# Patient Record
Sex: FEMALE | Race: WHITE | NOT HISPANIC OR LATINO | Employment: OTHER | URBAN - METROPOLITAN AREA
[De-identification: names, ages, dates, MRNs, and addresses within clinical notes are randomized per-mention and may not be internally consistent; named-entity substitution may affect disease eponyms.]

---

## 2017-01-09 DIAGNOSIS — E11.44 DIABETIC AMYOTROPHY ASSOCIATED WITH TYPE 2 DIABETES MELLITUS (HCC): Primary | ICD-10-CM

## 2017-01-11 ENCOUNTER — OFFICE VISIT (OUTPATIENT)
Dept: INTERNAL MEDICINE | Facility: CLINIC | Age: 72
End: 2017-01-11

## 2017-01-11 ENCOUNTER — RESULTS ENCOUNTER (OUTPATIENT)
Dept: INTERNAL MEDICINE | Facility: CLINIC | Age: 72
End: 2017-01-11

## 2017-01-11 VITALS
DIASTOLIC BLOOD PRESSURE: 80 MMHG | HEIGHT: 60 IN | BODY MASS INDEX: 27.48 KG/M2 | WEIGHT: 140 LBS | SYSTOLIC BLOOD PRESSURE: 135 MMHG | OXYGEN SATURATION: 98 % | HEART RATE: 112 BPM

## 2017-01-11 DIAGNOSIS — E11.44 DIABETIC AMYOTROPHY ASSOCIATED WITH TYPE 2 DIABETES MELLITUS (HCC): ICD-10-CM

## 2017-01-11 DIAGNOSIS — E87.1 HYPONATREMIA: ICD-10-CM

## 2017-01-11 DIAGNOSIS — F43.21 SITUATIONAL DEPRESSION: ICD-10-CM

## 2017-01-11 DIAGNOSIS — Z00.00 HEALTHCARE MAINTENANCE: ICD-10-CM

## 2017-01-11 DIAGNOSIS — E11.8 TYPE 2 DIABETES MELLITUS WITH COMPLICATION, WITHOUT LONG-TERM CURRENT USE OF INSULIN (HCC): Primary | ICD-10-CM

## 2017-01-11 DIAGNOSIS — G47.9 SLEEP DIFFICULTIES: ICD-10-CM

## 2017-01-11 DIAGNOSIS — M62.81 PROXIMAL LIMB MUSCLE WEAKNESS: ICD-10-CM

## 2017-01-11 LAB
ALBUMIN SERPL-MCNC: 4 G/DL (ref 3.5–5.2)
ALBUMIN/GLOB SERPL: 2 G/DL
ALP SERPL-CCNC: 42 U/L (ref 40–129)
ALT SERPL-CCNC: 24 U/L (ref 5–33)
AST SERPL-CCNC: 13 U/L (ref 5–32)
BASOPHILS # BLD AUTO: 0.04 10*3/MM3 (ref 0–0.2)
BASOPHILS NFR BLD AUTO: 0.3 % (ref 0–2)
BILIRUB SERPL-MCNC: 0.4 MG/DL (ref 0.2–1.2)
BUN SERPL-MCNC: 25 MG/DL (ref 8–23)
BUN/CREAT SERPL: 47.2 (ref 7–25)
CALCIUM SERPL-MCNC: 9.2 MG/DL (ref 8.8–10.5)
CHLORIDE SERPL-SCNC: 92 MMOL/L (ref 98–107)
CHOLEST SERPL-MCNC: 218 MG/DL (ref 0–200)
CO2 SERPL-SCNC: 28.3 MMOL/L (ref 22–29)
CREAT SERPL-MCNC: 0.53 MG/DL (ref 0.57–1)
EOSINOPHIL # BLD AUTO: 0.05 10*3/MM3 (ref 0.1–0.3)
EOSINOPHIL NFR BLD AUTO: 0.4 % (ref 0–4)
ERYTHROCYTE [DISTWIDTH] IN BLOOD BY AUTOMATED COUNT: 13 % (ref 11.5–14.5)
GLOBULIN SER CALC-MCNC: 2 GM/DL
GLUCOSE SERPL-MCNC: 285 MG/DL (ref 65–99)
HBA1C MFR BLD: 6.8 % (ref 4.8–5.6)
HCT VFR BLD AUTO: 42.4 % (ref 37–47)
HDLC SERPL-MCNC: 84 MG/DL (ref 40–60)
HGB BLD-MCNC: 14.3 G/DL (ref 12–16)
IMM GRANULOCYTES # BLD: 0.26 10*3/MM3 (ref 0–0.03)
IMM GRANULOCYTES NFR BLD: 2.2 % (ref 0–0.5)
LDLC SERPL CALC-MCNC: 103 MG/DL (ref 0–100)
LDLC/HDLC SERPL: 1.23 {RATIO}
LYMPHOCYTES # BLD AUTO: 0.82 10*3/MM3 (ref 0.6–4.8)
LYMPHOCYTES NFR BLD AUTO: 6.9 % (ref 20–45)
MCH RBC QN AUTO: 30.9 PG (ref 27–31)
MCHC RBC AUTO-ENTMCNC: 33.7 G/DL (ref 31–37)
MCV RBC AUTO: 91.6 FL (ref 81–99)
MONOCYTES # BLD AUTO: 0.37 10*3/MM3 (ref 0–1)
MONOCYTES NFR BLD AUTO: 3.1 % (ref 3–8)
NEUTROPHILS # BLD AUTO: 10.28 10*3/MM3 (ref 1.5–8.3)
NEUTROPHILS NFR BLD AUTO: 87.1 % (ref 45–70)
NRBC BLD AUTO-RTO: 0 /100 WBC (ref 0–0)
PLATELET # BLD AUTO: 218 10*3/MM3 (ref 140–500)
POTASSIUM SERPL-SCNC: 4.6 MMOL/L (ref 3.5–5.2)
PROT SERPL-MCNC: 6 G/DL (ref 6–8.5)
RBC # BLD AUTO: 4.63 10*6/MM3 (ref 4.2–5.4)
SODIUM SERPL-SCNC: 132 MMOL/L (ref 136–145)
T4 FREE SERPL-MCNC: 1.7 NG/DL (ref 0.93–1.7)
TRIGL SERPL-MCNC: 154 MG/DL (ref 0–150)
TSH SERPL DL<=0.005 MIU/L-ACNC: 2.18 MIU/ML (ref 0.27–4.2)
VLDLC SERPL CALC-MCNC: 30.8 MG/DL (ref 7–27)
WBC # BLD AUTO: 11.82 10*3/MM3 (ref 4.8–10.8)

## 2017-01-11 PROCEDURE — 99215 OFFICE O/P EST HI 40 MIN: CPT | Performed by: INTERNAL MEDICINE

## 2017-01-11 RX ORDER — DULOXETIN HYDROCHLORIDE 60 MG/1
60 CAPSULE, DELAYED RELEASE ORAL DAILY
Qty: 90 CAPSULE | Refills: 3 | Status: SHIPPED | OUTPATIENT
Start: 2017-01-11 | End: 2018-03-27 | Stop reason: SDUPTHER

## 2017-01-11 RX ORDER — ZOLPIDEM TARTRATE 5 MG/1
5 TABLET ORAL NIGHTLY PRN
Qty: 90 TABLET | Refills: 1 | Status: SHIPPED | OUTPATIENT
Start: 2017-01-11 | End: 2017-03-17 | Stop reason: SDUPTHER

## 2017-01-11 RX ORDER — DOCUSATE SODIUM 100 MG/1
100 CAPSULE, LIQUID FILLED ORAL 3 TIMES DAILY PRN
COMMUNITY
End: 2021-06-29

## 2017-01-11 RX ORDER — PREDNISONE 50 MG/1
15 TABLET ORAL DAILY
COMMUNITY
End: 2017-09-20 | Stop reason: DRUGHIGH

## 2017-01-11 RX ORDER — AMITRIPTYLINE HYDROCHLORIDE 100 MG/1
100 TABLET, FILM COATED ORAL NIGHTLY
Qty: 90 TABLET | Refills: 3 | Status: SHIPPED | OUTPATIENT
Start: 2017-01-11 | End: 2018-01-22

## 2017-01-11 NOTE — MR AVS SNAPSHOT
Lydia John   1/11/2017 10:00 AM   Office Visit    Dept Phone:  [phone]   Encounter #:  [id number]    Provider:  Selnea Davis MD   Department:  [facility] INTERNAL MED AND PEDS                Your Full Care Plan              Today's Medication Changes          These changes are accurate as of: 1/11/17 11:23 AM.  If you have any questions, ask your nurse or doctor.               Medication(s)that have changed:     amitriptyline 100 MG tablet   Commonly known as:  ELAVIL   Take 1 tablet by mouth Every Night.   What changed:  See the new instructions.   Changed by:  Selena Davis MD            Where to Get Your Medications      These medications were sent to [place] [address] - [facility] [place], [place] - [address] [facility] [phone] [facility] [phone]   [address] [place] [address]     Phone:  [phone]     amitriptyline 100 MG tablet    DULoxetine 60 MG capsule         You can get these medications from any pharmacy     Bring a paper prescription for each of these medications     zolpidem 5 MG tablet                  Your Updated Medication List          This list is accurate as of: 1/11/17 11:23 AM.  Always use your most recent med list.                amitriptyline 100 MG tablet   Commonly known as:  ELAVIL   Take 1 tablet by mouth Every Night.       calcium citrate-vitamin d 200-250 MG-UNIT tablet tablet   Commonly known as:  CITRACAL       docusate sodium 100 MG capsule   Commonly known as:  COLACE       DULoxetine 60 MG capsule   Commonly known as:  CYMBALTA   Take 1 capsule by mouth Daily.       ibuprofen 100 MG tablet   Commonly known as:  ADVIL,MOTRIN       metFORMIN 1000 MG tablet   Commonly known as:  GLUCOPHAGE       oxyCODONE-acetaminophen 7.5-325 MG per tablet   Commonly known as:  PERCOCET   Take 1 tablet by mouth Every 4 (Four) Hours As Needed for moderate pain (4-6).       predniSONE 50 MG tablet   Commonly known  as:  DELTASONE       SITagliptin 100 MG tablet   Commonly known as:  JANUVIA       vitamin B-12 100 MCG tablet   Commonly known as:  CYANOCOBALAMIN       vitamin D 18277 UNITS capsule capsule   Commonly known as:  ERGOCALCIFEROL       zolpidem 5 MG tablet   Commonly known as:  AMBIEN   Take 1 tablet by mouth At Night As Needed for sleep.               We Performed the Following     CBC & Differential     Comprehensive Metabolic Panel     Hemoglobin A1c     Lipid Panel With LDL / HDL Ratio     Microalbumin / Creatinine Urine Ratio     T4, Free     TSH       You Were Diagnosed With        Codes Comments    Type 2 diabetes mellitus with complication, without long-term current use of insulin    -  Primary ICD-10-CM: E11.8  ICD-9-CM: 250.90     Healthcare maintenance     ICD-10-CM: Z00.00  ICD-9-CM: V70.0     Situational depression     ICD-10-CM: F43.21  ICD-9-CM: 309.0     Diabetic amyotrophy associated with type 2 diabetes mellitus     ICD-10-CM: E11.44  ICD-9-CM: 250.60, 353.5     Proximal limb muscle weakness     ICD-10-CM: M62.89  ICD-9-CM: 728.87     Hyponatremia     ICD-10-CM: E87.1  ICD-9-CM: 276.1     Sleep difficulties     ICD-10-CM: G47.9  ICD-9-CM: 780.50       Instructions     None    Patient Instructions History      Upcoming Appointments     Visit Type Date Time Department    FOLLOW UP 2017 10:00 AM [facility]      [facility] Signup     [facility] allows you to send messages to your doctor, view your test results, renew your prescriptions, schedule appointments, and more. To sign up, go to [employer] and click on the Sign Up Now link in the New User? box. Enter your [facility] Activation Code exactly as it appears below along with the last four digits of your Social Security Number and your Date of Birth () to complete the sign-up process. If you do not sign up before the expiration date, you must request a new code.    [facility] Activation Code:  [email or web address]  Expires: 1/25/2017 11:23 AM    If you have questions, you can email [email or web address] or call [phone] to talk to our MyChart staff. Remember, MyChart is NOT to be used for urgent needs. For medical emergencies, dial 911.               Other Info from Your Visit           Allergies     No Known Allergies      Reason for Visit     Follow-up 2 MON f/u     Diabetes type II    Leg Swelling pt states started after bree \"it comes and goes\"    Foot Swelling pt states started after bree \"it comes and goes\"      Vital Signs     Blood Pressure Pulse Height Weight Oxygen Saturation Body Mass Index    135/80 (BP Location: Left arm, Patient Position: Sitting, Cuff Size: Adult) 112 60\" (152.4 cm) 140 lb (63.5 kg) 98% 27.34 kg/m2    Smoking Status                   Never Smoker           Problems and Diagnoses Noted     Diabetic amyotrophy associated with type 2 diabetes mellitus    Low sodium levels    Muscle weakness    Adjustment reaction    Type 2 diabetes mellitus with manifestations    Routine medical exam        Sleep difficulties            "

## 2017-01-11 NOTE — PROGRESS NOTES
"Subjective     Lydia John is a 71 y.o. female, who presents with a chief complaint of   Chief Complaint   Patient presents with   • Follow-up     2 MON f/u    • Diabetes     type II   • Leg Swelling     pt states started after bree \"it comes and goes\"   • Foot Swelling     pt states started after bree \"it comes and goes\"       HPI   The pt is here for follow up.  She has noticed a significant change in her pain on Dec 28.  before the 28th it was strong, stabbing, ,and electrical.  Since then it has been a little better.  On 1/2 they saw neuro (andrews) and there was minimal improvement.  She still takes percocet as she needs it but previously took it every 4 hours.  She is on high dose prednisone 50mg and on 1/17 will start to cut back.  With the pain improvement she is ready to start physical therapy.      Diabetes - her sugars have been a little higher lately. She brought her glucose log for review.  She ate some cupcakes over the holidays.  No hypoglycemia.  She is on prednisone 50mg daily.  Her bp and hr are up as well.  She has had some LE edema off and on.  She has compression socks but they make her legs hurt.  Elevating her feet does help.  She denies cp, soa, orthopnea, leach.      She went to pain management.  They discussed a spinal cord stimulator but her pain has improved with the steroids so they are waiting for now.      The following portions of the patient's history were reviewed and updated as appropriate: allergies, current medications, past family history, past medical history, past social history, past surgical history and problem list.    Allergies: Review of patient's allergies indicates no known allergies.    Review of Systems   Constitutional: Negative.    HENT: Negative.    Eyes: Negative.    Respiratory: Negative.    Cardiovascular: Negative.    Gastrointestinal: Negative.    Endocrine: Negative.    Genitourinary: Negative.    Musculoskeletal: Negative.    Skin: Negative.  "   Allergic/Immunologic: Negative.    Neurological: Negative.    Hematological: Negative.    Psychiatric/Behavioral: Negative.    All other systems reviewed and are negative.      Objective     Wt Readings from Last 3 Encounters:   01/11/17 140 lb (63.5 kg)   11/09/16 134 lb 9.6 oz (61.1 kg)   09/07/16 129 lb 3.2 oz (58.6 kg)     Temp Readings from Last 3 Encounters:   06/29/16 98.1 °F (36.7 °C)   05/31/16 98 °F (36.7 °C)     BP Readings from Last 3 Encounters:   01/11/17 135/80   11/09/16 118/70   09/07/16 112/80     Pulse Readings from Last 3 Encounters:   01/11/17 112   11/09/16 99   09/07/16 112     Body mass index is 27.34 kg/(m^2).    Physical Exam   Constitutional: She is oriented to person, place, and time. She appears well-developed and well-nourished. No distress.   HENT:   Head: Normocephalic and atraumatic.   Right Ear: External ear normal.   Left Ear: External ear normal.   Nose: Nose normal.   Mouth/Throat: Oropharynx is clear and moist.   Eyes: Conjunctivae and EOM are normal. Pupils are equal, round, and reactive to light.   Neck: Normal range of motion. Neck supple.   Cardiovascular: Normal rate, regular rhythm, normal heart sounds and intact distal pulses.    Pulmonary/Chest: Effort normal and breath sounds normal. No respiratory distress. She has no wheezes.   Musculoskeletal:   Pt in wheelchair  1+ bilat LE edema.     Neurological: She is alert and oriented to person, place, and time. She exhibits abnormal muscle tone.   Decreased strength bilat LE 2/5.   Skin: Skin is warm and dry.   Psychiatric: She has a normal mood and affect. Her behavior is normal. Judgment and thought content normal.   Nursing note and vitals reviewed.        11/16/16 labs  Creat normal    9/30/16 a1c 7.9  7/8/16 6.2    Results for orders placed or performed in visit on 01/11/17   Comprehensive Metabolic Panel   Result Value Ref Range    Glucose 285 (H) 65 - 99 mg/dL    BUN 25 (H) 8 - 23 mg/dL    Creatinine 0.53 (L) 0.57 -  1.00 mg/dL    eGFR Non African Am 114 >60 mL/min/1.73    eGFR African Am 138 >60 mL/min/1.73    BUN/Creatinine Ratio 47.2 (H) 7.0 - 25.0    Sodium 132 (L) 136 - 145 mmol/L    Potassium 4.6 3.5 - 5.2 mmol/L    Chloride 92 (L) 98 - 107 mmol/L    Total CO2 28.3 22.0 - 29.0 mmol/L    Calcium 9.2 8.8 - 10.5 mg/dL    Total Protein 6.0 6.0 - 8.5 g/dL    Albumin 4.00 3.50 - 5.20 g/dL    Globulin 2.0 gm/dL    A/G Ratio 2.0 g/dL    Total Bilirubin 0.4 0.2 - 1.2 mg/dL    Alkaline Phosphatase 42 40 - 129 U/L    AST (SGOT) 13 5 - 32 U/L    ALT (SGPT) 24 5 - 33 U/L   Hemoglobin A1c   Result Value Ref Range    Hemoglobin A1C 6.80 (H) 4.80 - 5.60 %   Lipid Panel With LDL / HDL Ratio   Result Value Ref Range    Total Cholesterol 218 (H) 0 - 200 mg/dL    Triglycerides 154 (H) 0 - 150 mg/dL    HDL Cholesterol 84 (H) 40 - 60 mg/dL    VLDL Cholesterol 30.8 (H) 7 - 27 mg/dL    LDL Cholesterol  103 (H) 0 - 100 mg/dL    LDL/HDL Ratio 1.23    T4, Free   Result Value Ref Range    Free T4 1.70 0.93 - 1.70 ng/dL   TSH   Result Value Ref Range    TSH 2.180 0.270 - 4.200 mIU/mL   CBC & Differential   Result Value Ref Range    WBC 11.82 (H) 4.80 - 10.80 10*3/mm3    RBC 4.63 4.20 - 5.40 10*6/mm3    Hemoglobin 14.3 12.0 - 16.0 g/dL    Hematocrit 42.4 37.0 - 47.0 %    MCV 91.6 81.0 - 99.0 fL    MCH 30.9 27.0 - 31.0 pg    MCHC 33.7 31.0 - 37.0 g/dL    RDW 13.0 11.5 - 14.5 %    Platelets 218 140 - 500 10*3/mm3    Neutrophil Rel % 87.1 (H) 45.0 - 70.0 %    Lymphocyte Rel % 6.9 (L) 20.0 - 45.0 %    Monocyte Rel % 3.1 3.0 - 8.0 %    Eosinophil Rel % 0.4 0.0 - 4.0 %    Basophil Rel % 0.3 0.0 - 2.0 %    Neutrophils Absolute 10.28 (H) 1.50 - 8.30 10*3/mm3    Lymphocytes Absolute 0.82 0.60 - 4.80 10*3/mm3    Monocytes Absolute 0.37 0.00 - 1.00 10*3/mm3    Eosinophils Absolute 0.05 (L) 0.10 - 0.30 10*3/mm3    Basophils Absolute 0.04 0.00 - 0.20 10*3/mm3    Immature Granulocyte Rel % 2.2 (H) 0.0 - 0.5 %    Immature Grans Absolute 0.26 (H) 0.00 - 0.03 10*3/mm3     nRBC 0.0 0.0 - 0.0 /100 WBC       Assessment/Plan   Lydia was seen today for follow-up, diabetes, leg swelling and foot swelling.    Diagnoses and all orders for this visit:    Type 2 diabetes mellitus with complication, without long-term current use of insulin  -     Comprehensive Metabolic Panel; Future  -     CBC & Differential; Future  -     Hemoglobin A1c; Future  -     Lipid Panel With LDL / HDL Ratio; Future  -     T4, Free; Future  -     TSH; Future  -     Microalbumin / Creatinine Urine Ratio  -     Comprehensive Metabolic Panel  -     CBC & Differential  -     Hemoglobin A1c  -     Lipid Panel With LDL / HDL Ratio  -     T4, Free  -     TSH    Healthcare maintenance  -     Cologuard; Future    Situational depression  -     DULoxetine (CYMBALTA) 60 MG capsule; Take 1 capsule by mouth Daily.    Diabetic amyotrophy associated with type 2 diabetes mellitus  -     amitriptyline (ELAVIL) 100 MG tablet; Take 1 tablet by mouth Every Night.  -     DULoxetine (CYMBALTA) 60 MG capsule; Take 1 capsule by mouth Daily.    Proximal limb muscle weakness  -     Comprehensive Metabolic Panel; Future  -     Comprehensive Metabolic Panel    Hyponatremia  -     Comprehensive Metabolic Panel; Future  -     Comprehensive Metabolic Panel    Sleep difficulties  -     zolpidem (AMBIEN) 5 MG tablet; Take 1 tablet by mouth At Night As Needed for sleep.        Current Outpatient Prescriptions:   •  amitriptyline (ELAVIL) 100 MG tablet, Take 1 tablet by mouth Every Night., Disp: 90 tablet, Rfl: 3  •  calcium citrate-vitamin d (CITRACAL) 200-250 MG-UNIT tablet tablet, Take 1 tablet by mouth daily., Disp: , Rfl:   •  docusate sodium (COLACE) 100 MG capsule, Take 100 mg by mouth 3 (Three) Times a Day As Needed for constipation., Disp: , Rfl:   •  DULoxetine (CYMBALTA) 60 MG capsule, Take 1 capsule by mouth Daily., Disp: 90 capsule, Rfl: 3  •  ibuprofen (ADVIL,MOTRIN) 100 MG tablet, Take 600 mg by mouth every 6 (six) hours., Disp: ,  Rfl:   •  metFORMIN (GLUCOPHAGE) 1000 MG tablet, Take 1,000 mg by mouth 2 (Two) Times a Day With Meals., Disp: , Rfl:   •  oxyCODONE-acetaminophen (PERCOCET) 7.5-325 MG per tablet, Take 1 tablet by mouth Every 4 (Four) Hours As Needed for moderate pain (4-6)., Disp: 90 tablet, Rfl: 0  •  predniSONE (DELTASONE) 50 MG tablet, Take 50 mg by mouth Daily., Disp: , Rfl:   •  sitaGLIPtin (JANUVIA) 100 MG tablet, Take 100 mg by mouth daily., Disp: , Rfl:   •  vitamin B-12 (CYANOCOBALAMIN) 100 MCG tablet, Take 50 mcg by mouth daily., Disp: , Rfl:   •  vitamin D (ERGOCALCIFEROL) 82391 UNITS capsule capsule, , Disp: , Rfl:   •  zolpidem (AMBIEN) 5 MG tablet, Take 1 tablet by mouth At Night As Needed for sleep., Disp: 90 tablet, Rfl: 1  Medications Discontinued During This Encounter   Medication Reason   • amitriptyline (ELAVIL) 100 MG tablet Reorder   • zolpidem (AMBIEN) 5 MG tablet Reorder   • DULoxetine (CYMBALTA) 60 MG capsule Reorder       Return in about 2 months (around 3/11/2017) for Recheck.

## 2017-01-17 ENCOUNTER — TELEPHONE (OUTPATIENT)
Dept: INTERNAL MEDICINE | Facility: CLINIC | Age: 72
End: 2017-01-17

## 2017-01-17 NOTE — TELEPHONE ENCOUNTER
Left message with results on patients voicemail.   ----- Message from Selena Davis MD sent at 1/15/2017 10:50 PM EST -----  Call pt about labs.  a1c 6.8.  Wbc up some but this is likely from steroids.  Cont same meds at this time.

## 2017-03-17 ENCOUNTER — OFFICE VISIT (OUTPATIENT)
Dept: INTERNAL MEDICINE | Facility: CLINIC | Age: 72
End: 2017-03-17

## 2017-03-17 VITALS
HEART RATE: 101 BPM | HEIGHT: 60 IN | OXYGEN SATURATION: 97 % | DIASTOLIC BLOOD PRESSURE: 88 MMHG | SYSTOLIC BLOOD PRESSURE: 134 MMHG

## 2017-03-17 DIAGNOSIS — E11.44 DIABETIC AMYOTROPHY ASSOCIATED WITH TYPE 2 DIABETES MELLITUS (HCC): ICD-10-CM

## 2017-03-17 DIAGNOSIS — E11.8 TYPE 2 DIABETES MELLITUS WITH COMPLICATION, WITHOUT LONG-TERM CURRENT USE OF INSULIN (HCC): Primary | ICD-10-CM

## 2017-03-17 DIAGNOSIS — E78.2 MIXED HYPERLIPIDEMIA: ICD-10-CM

## 2017-03-17 DIAGNOSIS — G47.9 SLEEP DIFFICULTIES: ICD-10-CM

## 2017-03-17 DIAGNOSIS — M62.81 PROXIMAL LIMB MUSCLE WEAKNESS: ICD-10-CM

## 2017-03-17 PROCEDURE — 99214 OFFICE O/P EST MOD 30 MIN: CPT | Performed by: INTERNAL MEDICINE

## 2017-03-17 RX ORDER — ZOLPIDEM TARTRATE 5 MG/1
5 TABLET ORAL NIGHTLY PRN
Qty: 90 TABLET | Refills: 1 | Status: SHIPPED | OUTPATIENT
Start: 2017-03-17 | End: 2017-05-24 | Stop reason: SDUPTHER

## 2017-03-17 NOTE — PROGRESS NOTES
Subjective     Lydia John is a 71 y.o. female, who presents with a chief complaint of   Chief Complaint   Patient presents with   • Follow-up   • Diabetes       HPI   The pt is here for follow up.  She has diabetic amyotrophy.  Dr. randolph is her neurologist.  Since her last OV she had and EMG that showed improvement.  She is doing home PT 3 times a week and is getting stronger.  She is starting to get more independent .  She improves with steroids and has weaned to 20mg daily. The plan is to wean steroids by 5mg q 2 weeks.  She is also on amitriptyline and duloxetine. She takes percocet PRN.      She has some wasting on her hands near the snuff box and has been referred to hand surgery for evaluation. She has bilat hand weakness and poor .      The following portions of the patient's history were reviewed and updated as appropriate: allergies, current medications, past family history, past medical history, past social history, past surgical history and problem list.    Allergies: Review of patient's allergies indicates no known allergies.    Review of Systems   HENT: Negative.    Eyes: Negative.    Respiratory: Negative.    Cardiovascular: Negative.    Gastrointestinal: Negative.    Endocrine: Negative.    Genitourinary: Negative.    Musculoskeletal: Negative.         Muscle weakness especially in legs and hands   Skin: Negative.    Allergic/Immunologic: Negative.    Neurological: Positive for weakness.   Hematological: Negative.    Psychiatric/Behavioral: Negative.    All other systems reviewed and are negative.      Objective     Wt Readings from Last 3 Encounters:   01/11/17 140 lb (63.5 kg)   11/09/16 134 lb 9.6 oz (61.1 kg)   09/07/16 129 lb 3.2 oz (58.6 kg)     Temp Readings from Last 3 Encounters:   06/29/16 98.1 °F (36.7 °C)   05/31/16 98 °F (36.7 °C)     BP Readings from Last 3 Encounters:   03/17/17 134/88   01/11/17 135/80   11/09/16 118/70     Pulse Readings from Last 3 Encounters:   03/17/17 101    01/11/17 112   11/09/16 99     There is no height or weight on file to calculate BMI.  SpO2 Readings from Last 3 Encounters:   03/17/17 97%   01/11/17 98%   11/09/16 98%       Physical Exam   Constitutional: She is oriented to person, place, and time. She appears well-developed and well-nourished. No distress.   HENT:   Head: Normocephalic and atraumatic.   Right Ear: External ear normal.   Left Ear: External ear normal.   Nose: Nose normal.   Mouth/Throat: Oropharynx is clear and moist.   Eyes: Conjunctivae and EOM are normal. Pupils are equal, round, and reactive to light.   Neck: Normal range of motion. Neck supple.   Cardiovascular: Normal rate, regular rhythm, normal heart sounds and intact distal pulses.    Pulmonary/Chest: Effort normal and breath sounds normal. No respiratory distress. She has no wheezes.   Musculoskeletal: Normal range of motion.   Normal gait   Neurological: She is alert and oriented to person, place, and time.   Strength 2/5 bilat LE ,  3/5   Skin: Skin is warm and dry.   Psychiatric: She has a normal mood and affect. Her behavior is normal. Judgment and thought content normal.   Nursing note and vitals reviewed.      Results for orders placed or performed in visit on 01/11/17   Comprehensive Metabolic Panel   Result Value Ref Range    Glucose 285 (H) 65 - 99 mg/dL    BUN 25 (H) 8 - 23 mg/dL    Creatinine 0.53 (L) 0.57 - 1.00 mg/dL    eGFR Non African Am 114 >60 mL/min/1.73    eGFR African Am 138 >60 mL/min/1.73    BUN/Creatinine Ratio 47.2 (H) 7.0 - 25.0    Sodium 132 (L) 136 - 145 mmol/L    Potassium 4.6 3.5 - 5.2 mmol/L    Chloride 92 (L) 98 - 107 mmol/L    Total CO2 28.3 22.0 - 29.0 mmol/L    Calcium 9.2 8.8 - 10.5 mg/dL    Total Protein 6.0 6.0 - 8.5 g/dL    Albumin 4.00 3.50 - 5.20 g/dL    Globulin 2.0 gm/dL    A/G Ratio 2.0 g/dL    Total Bilirubin 0.4 0.2 - 1.2 mg/dL    Alkaline Phosphatase 42 40 - 129 U/L    AST (SGOT) 13 5 - 32 U/L    ALT (SGPT) 24 5 - 33 U/L   Hemoglobin  A1c   Result Value Ref Range    Hemoglobin A1C 6.80 (H) 4.80 - 5.60 %   Lipid Panel With LDL / HDL Ratio   Result Value Ref Range    Total Cholesterol 218 (H) 0 - 200 mg/dL    Triglycerides 154 (H) 0 - 150 mg/dL    HDL Cholesterol 84 (H) 40 - 60 mg/dL    VLDL Cholesterol 30.8 (H) 7 - 27 mg/dL    LDL Cholesterol  103 (H) 0 - 100 mg/dL    LDL/HDL Ratio 1.23    T4, Free   Result Value Ref Range    Free T4 1.70 0.93 - 1.70 ng/dL   TSH   Result Value Ref Range    TSH 2.180 0.270 - 4.200 mIU/mL   CBC & Differential   Result Value Ref Range    WBC 11.82 (H) 4.80 - 10.80 10*3/mm3    RBC 4.63 4.20 - 5.40 10*6/mm3    Hemoglobin 14.3 12.0 - 16.0 g/dL    Hematocrit 42.4 37.0 - 47.0 %    MCV 91.6 81.0 - 99.0 fL    MCH 30.9 27.0 - 31.0 pg    MCHC 33.7 31.0 - 37.0 g/dL    RDW 13.0 11.5 - 14.5 %    Platelets 218 140 - 500 10*3/mm3    Neutrophil Rel % 87.1 (H) 45.0 - 70.0 %    Lymphocyte Rel % 6.9 (L) 20.0 - 45.0 %    Monocyte Rel % 3.1 3.0 - 8.0 %    Eosinophil Rel % 0.4 0.0 - 4.0 %    Basophil Rel % 0.3 0.0 - 2.0 %    Neutrophils Absolute 10.28 (H) 1.50 - 8.30 10*3/mm3    Lymphocytes Absolute 0.82 0.60 - 4.80 10*3/mm3    Monocytes Absolute 0.37 0.00 - 1.00 10*3/mm3    Eosinophils Absolute 0.05 (L) 0.10 - 0.30 10*3/mm3    Basophils Absolute 0.04 0.00 - 0.20 10*3/mm3    Immature Granulocyte Rel % 2.2 (H) 0.0 - 0.5 %    Immature Grans Absolute 0.26 (H) 0.00 - 0.03 10*3/mm3    nRBC 0.0 0.0 - 0.0 /100 WBC       Assessment/Plan   Lydia was seen today for follow-up and diabetes.    Diagnoses and all orders for this visit:    Type 2 diabetes mellitus with complication, without long-term current use of insulin  -     Comprehensive Metabolic Panel; Future  -     CBC & Differential; Future  -     Microalbumin / Creatinine Urine Ratio  -     Lipid Panel With LDL / HDL Ratio; Future  -     Hemoglobin A1c; Future    Sleep difficulties  -     zolpidem (AMBIEN) 5 MG tablet; Take 1 tablet by mouth At Night As Needed for Sleep.    Diabetic  amyotrophy associated with type 2 diabetes mellitus  -     Comprehensive Metabolic Panel; Future  -     CBC & Differential; Future    Proximal limb muscle weakness  -     Comprehensive Metabolic Panel; Future  -     CBC & Differential; Future    Mixed hyperlipidemia  -     Comprehensive Metabolic Panel; Future  -     Lipid Panel With LDL / HDL Ratio; Future          Outpatient Medications Prior to Visit   Medication Sig Dispense Refill   • amitriptyline (ELAVIL) 100 MG tablet Take 1 tablet by mouth Every Night. 90 tablet 3   • calcium citrate-vitamin d (CITRACAL) 200-250 MG-UNIT tablet tablet Take 1 tablet by mouth daily.     • docusate sodium (COLACE) 100 MG capsule Take 100 mg by mouth 3 (Three) Times a Day As Needed for constipation.     • DULoxetine (CYMBALTA) 60 MG capsule Take 1 capsule by mouth Daily. 90 capsule 3   • ibuprofen (ADVIL,MOTRIN) 100 MG tablet Take 600 mg by mouth every 6 (six) hours.     • metFORMIN (GLUCOPHAGE) 1000 MG tablet Take 1,000 mg by mouth 2 (Two) Times a Day With Meals.     • oxyCODONE-acetaminophen (PERCOCET) 7.5-325 MG per tablet Take 1 tablet by mouth Every 4 (Four) Hours As Needed for moderate pain (4-6). 90 tablet 0   • predniSONE (DELTASONE) 50 MG tablet Take 20 mg by mouth Daily.     • sitaGLIPtin (JANUVIA) 100 MG tablet Take 100 mg by mouth daily.     • vitamin B-12 (CYANOCOBALAMIN) 100 MCG tablet Take 50 mcg by mouth daily.     • vitamin D (ERGOCALCIFEROL) 97537 UNITS capsule capsule      • zolpidem (AMBIEN) 5 MG tablet Take 1 tablet by mouth At Night As Needed for sleep. 90 tablet 1     No facility-administered medications prior to visit.      New Medications Ordered This Visit   Medications   • zolpidem (AMBIEN) 5 MG tablet     Sig: Take 1 tablet by mouth At Night As Needed for Sleep.     Dispense:  90 tablet     Refill:  1     Medications Discontinued During This Encounter   Medication Reason   • zolpidem (AMBIEN) 5 MG tablet Reorder         Return for f/u about 2 mo.

## 2017-05-24 ENCOUNTER — OFFICE VISIT (OUTPATIENT)
Dept: INTERNAL MEDICINE | Facility: CLINIC | Age: 72
End: 2017-05-24

## 2017-05-24 VITALS
DIASTOLIC BLOOD PRESSURE: 78 MMHG | BODY MASS INDEX: 31.02 KG/M2 | HEART RATE: 95 BPM | SYSTOLIC BLOOD PRESSURE: 118 MMHG | HEIGHT: 60 IN | WEIGHT: 158 LBS | OXYGEN SATURATION: 97 %

## 2017-05-24 DIAGNOSIS — E11.44 DIABETIC AMYOTROPHY ASSOCIATED WITH TYPE 2 DIABETES MELLITUS (HCC): ICD-10-CM

## 2017-05-24 DIAGNOSIS — E11.8 TYPE 2 DIABETES MELLITUS WITH COMPLICATION, WITHOUT LONG-TERM CURRENT USE OF INSULIN (HCC): Primary | ICD-10-CM

## 2017-05-24 DIAGNOSIS — G47.9 SLEEP DIFFICULTIES: ICD-10-CM

## 2017-05-24 DIAGNOSIS — E78.2 MIXED HYPERLIPIDEMIA: ICD-10-CM

## 2017-05-24 PROCEDURE — 99215 OFFICE O/P EST HI 40 MIN: CPT | Performed by: INTERNAL MEDICINE

## 2017-05-24 RX ORDER — ZOLPIDEM TARTRATE 5 MG/1
5 TABLET ORAL NIGHTLY PRN
Qty: 90 TABLET | Refills: 1 | Status: SHIPPED | OUTPATIENT
Start: 2017-05-24 | End: 2017-09-20 | Stop reason: SDUPTHER

## 2017-05-26 LAB
ALBUMIN SERPL-MCNC: 4.2 G/DL (ref 3.5–5.2)
ALBUMIN/GLOB SERPL: 2.3 G/DL
ALP SERPL-CCNC: 43 U/L (ref 40–129)
ALT SERPL-CCNC: 21 U/L (ref 5–33)
AST SERPL-CCNC: 15 U/L (ref 5–32)
BASOPHILS # BLD AUTO: 0.06 10*3/MM3 (ref 0–0.2)
BASOPHILS NFR BLD AUTO: 0.6 % (ref 0–2)
BILIRUB SERPL-MCNC: 0.3 MG/DL (ref 0.2–1.2)
BUN SERPL-MCNC: 19 MG/DL (ref 8–23)
BUN/CREAT SERPL: 36.5 (ref 7–25)
CALCIUM SERPL-MCNC: 9.1 MG/DL (ref 8.8–10.5)
CHLORIDE SERPL-SCNC: 95 MMOL/L (ref 98–107)
CHOLEST SERPL-MCNC: 284 MG/DL (ref 100–199)
CO2 SERPL-SCNC: 24.1 MMOL/L (ref 22–29)
CREAT SERPL-MCNC: 0.52 MG/DL (ref 0.57–1)
EOSINOPHIL # BLD AUTO: 0.06 10*3/MM3 (ref 0.1–0.3)
EOSINOPHIL NFR BLD AUTO: 0.6 % (ref 0–4)
ERYTHROCYTE [DISTWIDTH] IN BLOOD BY AUTOMATED COUNT: 12.9 % (ref 11.5–14.5)
GLOBULIN SER CALC-MCNC: 1.8 GM/DL
GLUCOSE SERPL-MCNC: 240 MG/DL (ref 65–99)
HBA1C MFR BLD: 7.7 % (ref 4.8–5.6)
HCT VFR BLD AUTO: 40 % (ref 37–47)
HDL SERPL-SCNC: 44.3 UMOL/L
HDLC SERPL-MCNC: 91 MG/DL
HGB BLD-MCNC: 13.8 G/DL (ref 12–16)
IMM GRANULOCYTES # BLD: 0.2 10*3/MM3 (ref 0–0.03)
IMM GRANULOCYTES NFR BLD: 2 % (ref 0–0.5)
LDL SERPL QN: 22.2 NM
LDL SERPL-SCNC: 1853 NMOL/L
LDL SMALL SERPL-SCNC: <90 NMOL/L
LDLC SERPL CALC-MCNC: 149 MG/DL (ref 0–99)
LYMPHOCYTES # BLD AUTO: 0.87 10*3/MM3 (ref 0.6–4.8)
LYMPHOCYTES NFR BLD AUTO: 8.8 % (ref 20–45)
MCH RBC QN AUTO: 31.7 PG (ref 27–31)
MCHC RBC AUTO-ENTMCNC: 34.5 G/DL (ref 31–37)
MCV RBC AUTO: 91.7 FL (ref 81–99)
MONOCYTES # BLD AUTO: 0.4 10*3/MM3 (ref 0–1)
MONOCYTES NFR BLD AUTO: 4 % (ref 3–8)
NEUTROPHILS # BLD AUTO: 8.32 10*3/MM3 (ref 1.5–8.3)
NEUTROPHILS NFR BLD AUTO: 84 % (ref 45–70)
NRBC BLD AUTO-RTO: 0 /100 WBC (ref 0–0)
PLATELET # BLD AUTO: 230 10*3/MM3 (ref 140–500)
POTASSIUM SERPL-SCNC: 4.8 MMOL/L (ref 3.5–5.2)
PROT SERPL-MCNC: 6 G/DL (ref 6–8.5)
RBC # BLD AUTO: 4.36 10*6/MM3 (ref 4.2–5.4)
SODIUM SERPL-SCNC: 137 MMOL/L (ref 136–145)
T4 FREE SERPL-MCNC: 1.24 NG/DL (ref 0.93–1.7)
TRIGL SERPL-MCNC: 219 MG/DL (ref 0–149)
TSH SERPL DL<=0.005 MIU/L-ACNC: 1.74 MIU/ML (ref 0.27–4.2)
WBC # BLD AUTO: 9.91 10*3/MM3 (ref 4.8–10.8)

## 2017-09-12 DIAGNOSIS — E55.9 VITAMIN D DEFICIENCY: ICD-10-CM

## 2017-09-12 DIAGNOSIS — E11.8 TYPE 2 DIABETES MELLITUS WITH COMPLICATION, UNSPECIFIED LONG TERM INSULIN USE STATUS: ICD-10-CM

## 2017-09-12 DIAGNOSIS — E78.49 OTHER HYPERLIPIDEMIA: Primary | ICD-10-CM

## 2017-09-17 ENCOUNTER — RESULTS ENCOUNTER (OUTPATIENT)
Dept: INTERNAL MEDICINE | Facility: CLINIC | Age: 72
End: 2017-09-17

## 2017-09-17 DIAGNOSIS — E11.8 TYPE 2 DIABETES MELLITUS WITH COMPLICATION, UNSPECIFIED LONG TERM INSULIN USE STATUS: ICD-10-CM

## 2017-09-17 DIAGNOSIS — E78.49 OTHER HYPERLIPIDEMIA: ICD-10-CM

## 2017-09-17 DIAGNOSIS — E55.9 VITAMIN D DEFICIENCY: ICD-10-CM

## 2017-09-20 ENCOUNTER — OFFICE VISIT (OUTPATIENT)
Dept: INTERNAL MEDICINE | Facility: CLINIC | Age: 72
End: 2017-09-20

## 2017-09-20 ENCOUNTER — RESULTS ENCOUNTER (OUTPATIENT)
Dept: INTERNAL MEDICINE | Facility: CLINIC | Age: 72
End: 2017-09-20

## 2017-09-20 VITALS
HEART RATE: 102 BPM | HEIGHT: 60 IN | BODY MASS INDEX: 34.55 KG/M2 | WEIGHT: 176 LBS | OXYGEN SATURATION: 98 % | SYSTOLIC BLOOD PRESSURE: 136 MMHG | DIASTOLIC BLOOD PRESSURE: 86 MMHG

## 2017-09-20 DIAGNOSIS — E55.9 VITAMIN D DEFICIENCY: ICD-10-CM

## 2017-09-20 DIAGNOSIS — Z12.11 SCREENING FOR COLON CANCER: Primary | ICD-10-CM

## 2017-09-20 DIAGNOSIS — R42 VERTIGO: ICD-10-CM

## 2017-09-20 DIAGNOSIS — E13.44 DIABETIC AMYOTROPHY ASSOCIATED WITH OTHER SPECIFIED DIABETES MELLITUS (HCC): ICD-10-CM

## 2017-09-20 DIAGNOSIS — F43.21 SITUATIONAL DEPRESSION: ICD-10-CM

## 2017-09-20 DIAGNOSIS — G47.9 SLEEP DIFFICULTIES: ICD-10-CM

## 2017-09-20 DIAGNOSIS — Z12.11 SCREENING FOR COLON CANCER: ICD-10-CM

## 2017-09-20 DIAGNOSIS — E11.44 DIABETIC AMYOTROPHY ASSOCIATED WITH TYPE 2 DIABETES MELLITUS (HCC): ICD-10-CM

## 2017-09-20 DIAGNOSIS — Z23 NEED FOR VACCINATION: ICD-10-CM

## 2017-09-20 DIAGNOSIS — E78.2 MIXED HYPERLIPIDEMIA: ICD-10-CM

## 2017-09-20 DIAGNOSIS — E11.8 TYPE 2 DIABETES MELLITUS WITH COMPLICATION, WITHOUT LONG-TERM CURRENT USE OF INSULIN (HCC): ICD-10-CM

## 2017-09-20 DIAGNOSIS — K29.00 ACUTE GASTRITIS WITHOUT HEMORRHAGE, UNSPECIFIED GASTRITIS TYPE: ICD-10-CM

## 2017-09-20 PROCEDURE — 99215 OFFICE O/P EST HI 40 MIN: CPT | Performed by: INTERNAL MEDICINE

## 2017-09-20 PROCEDURE — G0008 ADMIN INFLUENZA VIRUS VAC: HCPCS | Performed by: INTERNAL MEDICINE

## 2017-09-20 PROCEDURE — 90662 IIV NO PRSV INCREASED AG IM: CPT | Performed by: INTERNAL MEDICINE

## 2017-09-20 RX ORDER — ZOLPIDEM TARTRATE 5 MG/1
5 TABLET ORAL NIGHTLY PRN
Qty: 90 TABLET | Refills: 1 | Status: SHIPPED | OUTPATIENT
Start: 2017-09-20 | End: 2018-04-24 | Stop reason: SDUPTHER

## 2017-09-20 RX ORDER — OMEPRAZOLE 20 MG/1
20 CAPSULE, DELAYED RELEASE ORAL DAILY
Qty: 90 CAPSULE | Refills: 0 | Status: SHIPPED | OUTPATIENT
Start: 2017-09-20 | End: 2017-12-16 | Stop reason: SDUPTHER

## 2017-09-20 RX ORDER — MECLIZINE HYDROCHLORIDE 25 MG/1
25 TABLET ORAL 3 TIMES DAILY PRN
Qty: 30 TABLET | Refills: 0 | Status: SHIPPED | OUTPATIENT
Start: 2017-09-20 | End: 2018-03-15 | Stop reason: ALTCHOICE

## 2017-09-20 RX ORDER — PREDNISONE 1 MG/1
TABLET ORAL
COMMUNITY
Start: 2017-06-19 | End: 2018-03-15 | Stop reason: ALTCHOICE

## 2017-09-20 NOTE — PROGRESS NOTES
Subjective     Lydia John is a 72 y.o. female, who presents with a chief complaint of   Chief Complaint   Patient presents with   • Follow-up     Was not able to get her labs done.    • Diabetes       HPI   The pt is here with her daughter today for follow up.     The pt has been having lots of dizziness, foot swelling, and heartburn.  She has been having stomach upset especially in the morning.  She is still on 5mg prednisone daily.  No soa or chest pain.  She has had vertigo.  Rolling in bed is very difficult and makes her feel like she is swimming.      Her bp is elevated today but much better on recheck.  She is not on bp meds.      She is worried about her memory.  She feels like she is having more issues remembering things.  The pt's father had dementia and she worries about this. She is on prednisone and chronic pain medications.      The following portions of the patient's history were reviewed and updated as appropriate: allergies, current medications, past family history, past medical history, past social history, past surgical history and problem list.    Allergies: Review of patient's allergies indicates no known allergies.    Review of Systems   Constitutional: Positive for fatigue.   HENT: Negative.    Eyes: Negative.    Respiratory: Negative.    Cardiovascular: Positive for leg swelling.   Gastrointestinal: Negative.    Endocrine: Negative.    Genitourinary: Negative.    Musculoskeletal: Negative.    Skin: Negative.    Allergic/Immunologic: Negative.    Neurological: Positive for weakness.   Hematological: Negative.    Psychiatric/Behavioral: Positive for decreased concentration.   All other systems reviewed and are negative.      Objective     Wt Readings from Last 3 Encounters:   09/20/17 176 lb (79.8 kg)   05/24/17 158 lb (71.7 kg)   01/11/17 140 lb (63.5 kg)     Temp Readings from Last 3 Encounters:   06/29/16 98.1 °F (36.7 °C)   05/31/16 98 °F (36.7 °C)     BP Readings from Last 3 Encounters:    09/20/17 136/86   05/24/17 118/78   03/17/17 134/88     Pulse Readings from Last 3 Encounters:   09/20/17 102   05/24/17 95   03/17/17 101     Body mass index is 34.37 kg/(m^2).  SpO2 Readings from Last 3 Encounters:   09/20/17 98%   05/24/17 97%   03/17/17 97%       Physical Exam   Constitutional: She is oriented to person, place, and time. She appears well-developed and well-nourished. No distress.   HENT:   Head: Normocephalic and atraumatic.   Right Ear: External ear normal.   Left Ear: External ear normal.   Nose: Nose normal.   Mouth/Throat: Oropharynx is clear and moist.   Eyes: Conjunctivae and EOM are normal. Pupils are equal, round, and reactive to light.   Neck: Normal range of motion. Neck supple.   Cardiovascular: Normal rate, regular rhythm, normal heart sounds and intact distal pulses.    Pulmonary/Chest: Effort normal and breath sounds normal. No respiratory distress. She has no wheezes.   Musculoskeletal: She exhibits edema.   Pt in wheelchair.     Neurological: She is alert and oriented to person, place, and time.   Skin: Skin is warm and dry.   Psychiatric: She has a normal mood and affect. Her behavior is normal. Judgment and thought content normal.   Nursing note and vitals reviewed.      Results for orders placed or performed in visit on 05/24/17   Comprehensive Metabolic Panel   Result Value Ref Range    Glucose 240 (H) 65 - 99 mg/dL    BUN 19 8 - 23 mg/dL    Creatinine 0.52 (L) 0.57 - 1.00 mg/dL    eGFR Non African Am 117 >60 mL/min/1.73    eGFR African Am 141 >60 mL/min/1.73    BUN/Creatinine Ratio 36.5 (H) 7.0 - 25.0    Sodium 137 136 - 145 mmol/L    Potassium 4.8 3.5 - 5.2 mmol/L    Chloride 95 (L) 98 - 107 mmol/L    Total CO2 24.1 22.0 - 29.0 mmol/L    Calcium 9.1 8.8 - 10.5 mg/dL    Total Protein 6.0 6.0 - 8.5 g/dL    Albumin 4.20 3.50 - 5.20 g/dL    Globulin 1.8 gm/dL    A/G Ratio 2.3 g/dL    Total Bilirubin 0.3 0.2 - 1.2 mg/dL    Alkaline Phosphatase 43 40 - 129 U/L    AST (SGOT) 15  5 - 32 U/L    ALT (SGPT) 21 5 - 33 U/L   T4, Free   Result Value Ref Range    Free T4 1.24 0.93 - 1.70 ng/dL   TSH   Result Value Ref Range    TSH 1.740 0.270 - 4.200 mIU/mL   NMR LipoProfile   Result Value Ref Range    LDL-P 1853 (H) <1000 nmol/L    LDL-C 149 (H) 0 - 99 mg/dL    HDL-C 91 >39 mg/dL    Triglycerides 219 (H) 0 - 149 mg/dL    Total Cholesterol 284 (H) 100 - 199 mg/dL    HDL-P (Total) 44.3 >=30.5 umol/L    Small LDL-P <90 <=527 nmol/L    LDL Size 22.2 >20.5 nm   Hemoglobin A1c   Result Value Ref Range    Hemoglobin A1C 7.70 (H) 4.80 - 5.60 %   CBC & Differential   Result Value Ref Range    WBC 9.91 4.80 - 10.80 10*3/mm3    RBC 4.36 4.20 - 5.40 10*6/mm3    Hemoglobin 13.8 12.0 - 16.0 g/dL    Hematocrit 40.0 37.0 - 47.0 %    MCV 91.7 81.0 - 99.0 fL    MCH 31.7 (H) 27.0 - 31.0 pg    MCHC 34.5 31.0 - 37.0 g/dL    RDW 12.9 11.5 - 14.5 %    Platelets 230 140 - 500 10*3/mm3    Neutrophil Rel % 84.0 (H) 45.0 - 70.0 %    Lymphocyte Rel % 8.8 (L) 20.0 - 45.0 %    Monocyte Rel % 4.0 3.0 - 8.0 %    Eosinophil Rel % 0.6 0.0 - 4.0 %    Basophil Rel % 0.6 0.0 - 2.0 %    Neutrophils Absolute 8.32 (H) 1.50 - 8.30 10*3/mm3    Lymphocytes Absolute 0.87 0.60 - 4.80 10*3/mm3    Monocytes Absolute 0.40 0.00 - 1.00 10*3/mm3    Eosinophils Absolute 0.06 (L) 0.10 - 0.30 10*3/mm3    Basophils Absolute 0.06 0.00 - 0.20 10*3/mm3    Immature Granulocyte Rel % 2.0 (H) 0.0 - 0.5 %    Immature Grans Absolute 0.20 (H) 0.00 - 0.03 10*3/mm3    nRBC 0.0 0.0 - 0.0 /100 WBC       Assessment/Plan   Lydia was seen today for follow-up and diabetes.    Diagnoses and all orders for this visit:    Screening for colon cancer  -     Cologuard; Future    Vertigo  -     meclizine (ANTIVERT) 25 MG tablet; Take 1 tablet by mouth 3 (Three) Times a Day As Needed for dizziness.    Acute gastritis without hemorrhage, unspecified gastritis type  -     omeprazole (PRILOSEC) 20 MG capsule; Take 1 capsule by mouth Daily.    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      Outpatient Medications Prior to Visit   Medication Sig Dispense Refill   • amitriptyline (ELAVIL) 100 MG tablet Take 1 tablet by mouth Every Night. 90 tablet 3   • calcium citrate-vitamin d (CITRACAL) 200-250 MG-UNIT tablet tablet Take 1 tablet by mouth daily.     • docusate sodium (COLACE) 100 MG capsule Take 100 mg by mouth 3 (Three) Times a Day As Needed for constipation.     • DULoxetine (CYMBALTA) 60 MG capsule Take 1 capsule by mouth Daily. 90 capsule 3   • oxyCODONE-acetaminophen (PERCOCET) 7.5-325 MG per tablet Take 1 tablet by mouth Every 4 (Four) Hours As Needed for moderate pain (4-6). 90 tablet 0   • sitaGLIPtin (JANUVIA) 100 MG tablet Take 100 mg by mouth daily.     • vitamin B-12 (CYANOCOBALAMIN) 100 MCG tablet Take 50 mcg by mouth daily.     • vitamin D (ERGOCALCIFEROL) 45451 UNITS capsule capsule      • zolpidem (AMBIEN) 5 MG tablet Take 1 tablet by mouth At Night As Needed for Sleep. 90 tablet 1   • ibuprofen (ADVIL,MOTRIN) 100 MG tablet Take 600 mg by mouth every 6 (six) hours.     • metFORMIN (GLUCOPHAGE) 1000 MG tablet Take 1,000 mg by mouth 2 (Two) Times a Day With Meals.     • predniSONE (DELTASONE) 50 MG tablet Take 15 mg by mouth Daily.       No facility-administered medications prior to visit.      No orders of the defined types  were placed in this encounter.      Medications Discontinued During This Encounter   Medication Reason   • predniSONE (DELTASONE) 50 MG tablet Dose adjustment   • metFORMIN (GLUCOPHAGE) 1000 MG tablet Dose adjustment   • ibuprofen (ADVIL,MOTRIN) 100 MG tablet          Return in about 3 months (around 12/20/2017) for Recheck, labs.

## 2017-09-21 LAB
25(OH)D3+25(OH)D2 SERPL-MCNC: 19.6 NG/ML
ALBUMIN SERPL-MCNC: 4.2 G/DL (ref 3.5–5.2)
ALBUMIN/GLOB SERPL: 1.9 G/DL
ALP SERPL-CCNC: 80 U/L (ref 40–129)
ALT SERPL-CCNC: 29 U/L (ref 5–33)
AST SERPL-CCNC: 20 U/L (ref 5–32)
BASOPHILS # BLD AUTO: 0.03 10*3/MM3 (ref 0–0.2)
BASOPHILS NFR BLD AUTO: 0.3 % (ref 0–2)
BILIRUB SERPL-MCNC: 0.2 MG/DL (ref 0.2–1.2)
BUN SERPL-MCNC: 19 MG/DL (ref 8–23)
BUN/CREAT SERPL: 35.8 (ref 7–25)
CALCIUM SERPL-MCNC: 9.5 MG/DL (ref 8.8–10.5)
CHLORIDE SERPL-SCNC: 96 MMOL/L (ref 98–107)
CHOLEST SERPL-MCNC: 273 MG/DL (ref 100–199)
CO2 SERPL-SCNC: 24.1 MMOL/L (ref 22–29)
CREAT SERPL-MCNC: 0.53 MG/DL (ref 0.57–1)
EOSINOPHIL # BLD AUTO: 0.1 10*3/MM3 (ref 0.1–0.3)
EOSINOPHIL NFR BLD AUTO: 0.9 % (ref 0–4)
ERYTHROCYTE [DISTWIDTH] IN BLOOD BY AUTOMATED COUNT: 13.2 % (ref 11.5–14.5)
GLOBULIN SER CALC-MCNC: 2.2 GM/DL
GLUCOSE SERPL-MCNC: 138 MG/DL (ref 65–99)
HBA1C MFR BLD: 6.4 % (ref 4.8–5.6)
HCT VFR BLD AUTO: 40.1 % (ref 37–47)
HDL SERPL-SCNC: 28.7 UMOL/L
HDLC SERPL-MCNC: 62 MG/DL
HGB BLD-MCNC: 13.5 G/DL (ref 12–16)
IMM GRANULOCYTES # BLD: 0.09 10*3/MM3 (ref 0–0.03)
IMM GRANULOCYTES NFR BLD: 0.8 % (ref 0–0.5)
LDL SERPL QN: 21 NM
LDL SERPL-SCNC: 2295 NMOL/L
LDL SMALL SERPL-SCNC: 1045 NMOL/L
LDLC SERPL CALC-MCNC: 157 MG/DL (ref 0–99)
LYMPHOCYTES # BLD AUTO: 1.3 10*3/MM3 (ref 0.6–4.8)
LYMPHOCYTES NFR BLD AUTO: 12.3 % (ref 20–45)
MCH RBC QN AUTO: 30.9 PG (ref 27–31)
MCHC RBC AUTO-ENTMCNC: 33.7 G/DL (ref 31–37)
MCV RBC AUTO: 91.8 FL (ref 81–99)
MONOCYTES # BLD AUTO: 0.65 10*3/MM3 (ref 0–1)
MONOCYTES NFR BLD AUTO: 6.1 % (ref 3–8)
NEUTROPHILS # BLD AUTO: 8.43 10*3/MM3 (ref 1.5–8.3)
NEUTROPHILS NFR BLD AUTO: 79.6 % (ref 45–70)
NRBC BLD AUTO-RTO: 0 /100 WBC (ref 0–0)
PLATELET # BLD AUTO: 259 10*3/MM3 (ref 140–500)
POTASSIUM SERPL-SCNC: 4.7 MMOL/L (ref 3.5–5.2)
PROT SERPL-MCNC: 6.4 G/DL (ref 6–8.5)
RBC # BLD AUTO: 4.37 10*6/MM3 (ref 4.2–5.4)
SODIUM SERPL-SCNC: 135 MMOL/L (ref 136–145)
TRIGL SERPL-MCNC: 272 MG/DL (ref 0–149)
WBC # BLD AUTO: 10.6 10*3/MM3 (ref 4.8–10.8)

## 2017-12-16 DIAGNOSIS — K29.00 ACUTE GASTRITIS WITHOUT HEMORRHAGE, UNSPECIFIED GASTRITIS TYPE: ICD-10-CM

## 2017-12-18 RX ORDER — OMEPRAZOLE 20 MG/1
CAPSULE, DELAYED RELEASE ORAL
Qty: 90 CAPSULE | Refills: 1 | Status: SHIPPED | OUTPATIENT
Start: 2017-12-18 | End: 2018-06-09 | Stop reason: SDUPTHER

## 2018-01-22 ENCOUNTER — OFFICE VISIT (OUTPATIENT)
Dept: INTERNAL MEDICINE | Facility: CLINIC | Age: 73
End: 2018-01-22

## 2018-01-22 VITALS
OXYGEN SATURATION: 97 % | HEIGHT: 60 IN | DIASTOLIC BLOOD PRESSURE: 78 MMHG | WEIGHT: 177.4 LBS | HEART RATE: 93 BPM | BODY MASS INDEX: 34.83 KG/M2 | SYSTOLIC BLOOD PRESSURE: 112 MMHG

## 2018-01-22 DIAGNOSIS — R94.31 ABNORMAL EKG: ICD-10-CM

## 2018-01-22 DIAGNOSIS — E13.44 DIABETIC AMYOTROPHY ASSOCIATED WITH OTHER SPECIFIED DIABETES MELLITUS (HCC): ICD-10-CM

## 2018-01-22 DIAGNOSIS — M79.602 LEFT ARM PAIN: ICD-10-CM

## 2018-01-22 DIAGNOSIS — E78.2 MIXED HYPERLIPIDEMIA: Primary | ICD-10-CM

## 2018-01-22 DIAGNOSIS — E11.8 TYPE 2 DIABETES MELLITUS WITH COMPLICATION, WITHOUT LONG-TERM CURRENT USE OF INSULIN (HCC): ICD-10-CM

## 2018-01-22 LAB
ALBUMIN SERPL-MCNC: 4.1 G/DL (ref 3.5–5.2)
ALBUMIN/GLOB SERPL: 2.3 G/DL
ALP SERPL-CCNC: 109 U/L (ref 40–129)
ALT SERPL-CCNC: 24 U/L (ref 5–33)
AST SERPL-CCNC: 22 U/L (ref 5–32)
BASOPHILS # BLD AUTO: 0.01 10*3/MM3 (ref 0–0.2)
BASOPHILS NFR BLD AUTO: 0.1 % (ref 0–2)
BILIRUB SERPL-MCNC: 0.2 MG/DL (ref 0.2–1.2)
BUN SERPL-MCNC: 14 MG/DL (ref 8–23)
BUN/CREAT SERPL: 16.5 (ref 7–25)
CALCIUM SERPL-MCNC: 9.3 MG/DL (ref 8.8–10.5)
CHLORIDE SERPL-SCNC: 98 MMOL/L (ref 98–107)
CHOLEST SERPL-MCNC: 287 MG/DL (ref 0–200)
CO2 SERPL-SCNC: 27.9 MMOL/L (ref 22–29)
CREAT SERPL-MCNC: 0.85 MG/DL (ref 0.57–1)
EOSINOPHIL # BLD AUTO: 0.34 10*3/MM3 (ref 0.1–0.3)
EOSINOPHIL NFR BLD AUTO: 4.2 % (ref 0–4)
ERYTHROCYTE [DISTWIDTH] IN BLOOD BY AUTOMATED COUNT: 13.7 % (ref 11.5–14.5)
GLOBULIN SER CALC-MCNC: 1.8 GM/DL
GLUCOSE SERPL-MCNC: 120 MG/DL (ref 65–99)
HBA1C MFR BLD: 5.5 % (ref 4.8–5.6)
HCT VFR BLD AUTO: 39 % (ref 37–47)
HDLC SERPL-MCNC: 53 MG/DL (ref 40–60)
HGB BLD-MCNC: 12.7 G/DL (ref 12–16)
IMM GRANULOCYTES # BLD: 0.04 10*3/MM3 (ref 0–0.03)
IMM GRANULOCYTES NFR BLD: 0.5 % (ref 0–0.5)
LDLC SERPL CALC-MCNC: 160 MG/DL (ref 0–100)
LDLC/HDLC SERPL: 3.02 {RATIO}
LYMPHOCYTES # BLD AUTO: 1.89 10*3/MM3 (ref 0.6–4.8)
LYMPHOCYTES NFR BLD AUTO: 23.5 % (ref 20–45)
MCH RBC QN AUTO: 29.8 PG (ref 27–31)
MCHC RBC AUTO-ENTMCNC: 32.6 G/DL (ref 31–37)
MCV RBC AUTO: 91.5 FL (ref 81–99)
MONOCYTES # BLD AUTO: 0.54 10*3/MM3 (ref 0–1)
MONOCYTES NFR BLD AUTO: 6.7 % (ref 3–8)
NEUTROPHILS # BLD AUTO: 5.22 10*3/MM3 (ref 1.5–8.3)
NEUTROPHILS NFR BLD AUTO: 65 % (ref 45–70)
NRBC BLD AUTO-RTO: 0 /100 WBC (ref 0–0)
PLATELET # BLD AUTO: 281 10*3/MM3 (ref 140–500)
POTASSIUM SERPL-SCNC: 5.2 MMOL/L (ref 3.5–5.2)
PROT SERPL-MCNC: 5.9 G/DL (ref 6–8.5)
RBC # BLD AUTO: 4.26 10*6/MM3 (ref 4.2–5.4)
SODIUM SERPL-SCNC: 139 MMOL/L (ref 136–145)
T4 FREE SERPL-MCNC: 1.14 NG/DL (ref 0.93–1.7)
TRIGL SERPL-MCNC: 371 MG/DL (ref 0–150)
TSH SERPL DL<=0.005 MIU/L-ACNC: 4.02 MIU/ML (ref 0.27–4.2)
VLDLC SERPL CALC-MCNC: 74.2 MG/DL (ref 7–27)
WBC # BLD AUTO: 8.04 10*3/MM3 (ref 4.8–10.8)

## 2018-01-22 PROCEDURE — 93000 ELECTROCARDIOGRAM COMPLETE: CPT | Performed by: INTERNAL MEDICINE

## 2018-01-22 PROCEDURE — 99215 OFFICE O/P EST HI 40 MIN: CPT | Performed by: INTERNAL MEDICINE

## 2018-01-22 RX ORDER — AMITRIPTYLINE HYDROCHLORIDE 75 MG/1
TABLET, FILM COATED ORAL NIGHTLY
COMMUNITY
Start: 2018-01-14 | End: 2018-10-30 | Stop reason: SDUPTHER

## 2018-01-22 NOTE — PROGRESS NOTES
Subjective     Lydia John is a 72 y.o. female, who presents with a chief complaint of   Chief Complaint   Patient presents with   • Follow-up   • Diabetes       HPI   The pt is here for follow up.  She is still in a wheel chair.  She has been doing fairly well but she tires out easily.      The pt had been having diarrhea that seemed to be worse with the metformin.  She is doing better with a lower dose of metformin.  She is off her steroids at this time and glucoses were down around 70-80.    She has chronic LE edema.  She has some left arm pain.  She braces herself with her left arm often on her walker.  She gets more pain when she raises her left arm.  + worsening fatigue.  No chest pain.  Sometimes she gets dizzy.  She is not on bp meds.  Both parents and grandparents on both sides with heart issues.  She has a younger brother that is fairly healthy.      She continences to follow with dr randolph with neurology.  Her amitriptyline was cut down from 100mg to 75mg daily.  During the morning she does fine and then around 3-4 pm she has pain that begins and goes through the night.  Her next appt with neuro is Thursday this week.  She isnt in PT right now.  She knows she should be doing her PT exercises but is not doing them currently.  She does try to stay up and active as much as possible.     She is sleeping better with low dose ambien.        The following portions of the patient's history were reviewed and updated as appropriate: allergies, current medications, past family history, past medical history, past social history, past surgical history and problem list.    Allergies: Review of patient's allergies indicates no known allergies.    Review of Systems   Constitutional: Negative.    HENT: Negative.    Eyes: Negative.    Respiratory: Negative.    Cardiovascular: Negative.    Gastrointestinal: Negative.    Endocrine: Negative.    Genitourinary: Negative.    Musculoskeletal:        Chronic leg pain and  weakness   Skin: Negative.    Allergic/Immunologic: Negative.    Neurological: Negative.    Hematological: Negative.    Psychiatric/Behavioral: Negative.    All other systems reviewed and are negative.      Objective     Wt Readings from Last 3 Encounters:   01/22/18 80.5 kg (177 lb 6.4 oz)   09/20/17 79.8 kg (176 lb)   05/24/17 71.7 kg (158 lb)     Temp Readings from Last 3 Encounters:   06/29/16 98.1 °F (36.7 °C)   05/31/16 98 °F (36.7 °C)     BP Readings from Last 3 Encounters:   01/22/18 112/78   09/20/17 136/86   05/24/17 118/78     Pulse Readings from Last 3 Encounters:   01/22/18 93   09/20/17 102   05/24/17 95     Body mass index is 34.65 kg/(m^2).  SpO2 Readings from Last 3 Encounters:   01/22/18 97%   09/20/17 98%   05/24/17 97%       Physical Exam   Constitutional: She is oriented to person, place, and time. She appears well-developed and well-nourished. No distress.   HENT:   Head: Normocephalic and atraumatic.   Right Ear: External ear normal.   Left Ear: External ear normal.   Nose: Nose normal.   Mouth/Throat: Oropharynx is clear and moist.   Eyes: Conjunctivae and EOM are normal. Pupils are equal, round, and reactive to light.   Neck: Normal range of motion. Neck supple.   Cardiovascular: Normal rate, regular rhythm, normal heart sounds and intact distal pulses.    Pulmonary/Chest: Effort normal and breath sounds normal. No respiratory distress. She has no wheezes.   Musculoskeletal: Normal range of motion.   Normal gait   Neurological: She is alert and oriented to person, place, and time.   Skin: Skin is warm and dry.   Psychiatric: She has a normal mood and affect. Her behavior is normal. Judgment and thought content normal.   Nursing note and vitals reviewed.      Results for orders placed or performed in visit on 09/20/17   Comprehensive Metabolic Panel   Result Value Ref Range    Glucose 138 (H) 65 - 99 mg/dL    BUN 19 8 - 23 mg/dL    Creatinine 0.53 (L) 0.57 - 1.00 mg/dL    eGFR Non African Am  114 >60 mL/min/1.73    eGFR African Am 138 >60 mL/min/1.73    BUN/Creatinine Ratio 35.8 (H) 7.0 - 25.0    Sodium 135 (L) 136 - 145 mmol/L    Potassium 4.7 3.5 - 5.2 mmol/L    Chloride 96 (L) 98 - 107 mmol/L    Total CO2 24.1 22.0 - 29.0 mmol/L    Calcium 9.5 8.8 - 10.5 mg/dL    Total Protein 6.4 6.0 - 8.5 g/dL    Albumin 4.20 3.50 - 5.20 g/dL    Globulin 2.2 gm/dL    A/G Ratio 1.9 g/dL    Total Bilirubin 0.2 0.2 - 1.2 mg/dL    Alkaline Phosphatase 80 40 - 129 U/L    AST (SGOT) 20 5 - 32 U/L    ALT (SGPT) 29 5 - 33 U/L   NMR LipoProfile   Result Value Ref Range    LDL-P 2295 (H) <1000 nmol/L    LDL-C 157 (H) 0 - 99 mg/dL    HDL-C 62 >39 mg/dL    Triglycerides 272 (H) 0 - 149 mg/dL    Total Cholesterol 273 (H) 100 - 199 mg/dL    HDL-P (Total) 28.7 (L) >=30.5 umol/L    Small LDL-P 1045 (H) <=527 nmol/L    LDL Size 21.0 >20.5 nm   Hemoglobin A1c   Result Value Ref Range    Hemoglobin A1C 6.40 (H) 4.80 - 5.60 %   Vitamin D 25 Hydroxy   Result Value Ref Range    25 Hydroxy, Vitamin D 19.6 ng/mL   CBC & Differential   Result Value Ref Range    WBC 10.60 4.80 - 10.80 10*3/mm3    RBC 4.37 4.20 - 5.40 10*6/mm3    Hemoglobin 13.5 12.0 - 16.0 g/dL    Hematocrit 40.1 37.0 - 47.0 %    MCV 91.8 81.0 - 99.0 fL    MCH 30.9 27.0 - 31.0 pg    MCHC 33.7 31.0 - 37.0 g/dL    RDW 13.2 11.5 - 14.5 %    Platelets 259 140 - 500 10*3/mm3    Neutrophil Rel % 79.6 (H) 45.0 - 70.0 %    Lymphocyte Rel % 12.3 (L) 20.0 - 45.0 %    Monocyte Rel % 6.1 3.0 - 8.0 %    Eosinophil Rel % 0.9 0.0 - 4.0 %    Basophil Rel % 0.3 0.0 - 2.0 %    Neutrophils Absolute 8.43 (H) 1.50 - 8.30 10*3/mm3    Lymphocytes Absolute 1.30 0.60 - 4.80 10*3/mm3    Monocytes Absolute 0.65 0.00 - 1.00 10*3/mm3    Eosinophils Absolute 0.10 0.10 - 0.30 10*3/mm3    Basophils Absolute 0.03 0.00 - 0.20 10*3/mm3    Immature Granulocyte Rel % 0.8 (H) 0.0 - 0.5 %    Immature Grans Absolute 0.09 (H) 0.00 - 0.03 10*3/mm3    nRBC 0.0 0.0 - 0.0 /100 WBC       ECG 12 Lead  Date/Time:  1/22/2018 3:37 PM  Performed by: JESSI MADDEN  Authorized by: JESSI MADDEN   Comparison: not compared with previous ECG   Previous ECG: no previous ECG available  Rate: normal  BPM: 88  Conduction comments: rsr' in II and v6  ST Segments: ST segments normal  T Waves: T waves normal  QRS axis: indeterminate  Q waves: III and aVF  Clinical impression: abnormal ECG            Assessment/Plan   Lydia was seen today for follow-up and diabetes.    Diagnoses and all orders for this visit:    Mixed hyperlipidemia  -     Comprehensive Metabolic Panel; Future  -     Lipid Panel With LDL / HDL Ratio; Future  -     ECG 12 Lead; Future  -     Comprehensive Metabolic Panel  -     Lipid Panel With LDL / HDL Ratio    Type 2 diabetes mellitus with complication, without long-term current use of insulin  -     Comprehensive Metabolic Panel; Future  -     CBC & Differential; Future  -     T4, Free; Future  -     TSH; Future  -     Lipid Panel With LDL / HDL Ratio; Future  -     Hemoglobin A1c; Future  -     ECG 12 Lead; Future  -     Comprehensive Metabolic Panel  -     CBC & Differential  -     T4, Free  -     TSH  -     Lipid Panel With LDL / HDL Ratio  -     Hemoglobin A1c    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  -     Comprehensive Metabolic Panel; Future  -     CBC & Differential; Future  -     T4, Free; Future  -     TSH; Future  -     Lipid Panel With LDL / HDL Ratio; Future  -     Hemoglobin A1c; Future  -     ECG 12 Lead; Future  -     Ambulatory Referral to Cardiology  -     Comprehensive Metabolic Panel  -     CBC & Differential  -     T4, Free  -     TSH  -     Lipid Panel With LDL / HDL Ratio  -     Hemoglobin A1c    Abnormal EKG  -     Ambulatory Referral to Cardiology    Left arm pain    Other orders  -     Cancel: Adult Transthoracic Echo Complete W/ Cont if Necessary Per Protocol; Future  -     ECG 12 Lead    ambien refilled today    Pt with abnormal ekg,  diabetes, and significant family history cad.  No previous ekg available for comparison.  No chest pain but she has had increased fatigue and intermittent left arm pain.  Concern for cardiac etiology of her LE edema.  Pt also with diabetic amyotrophy which doesn't typically have cardiac involvement but pt needs evaluation.  Refer to cardiology for further evaluation.      Outpatient Medications Prior to Visit   Medication Sig Dispense Refill   • calcium citrate-vitamin d (CITRACAL) 200-250 MG-UNIT tablet tablet Take 1 tablet by mouth daily.     • docusate sodium (COLACE) 100 MG capsule Take 100 mg by mouth 3 (Three) Times a Day As Needed for constipation.     • DULoxetine (CYMBALTA) 60 MG capsule Take 1 capsule by mouth Daily. 90 capsule 3   • meclizine (ANTIVERT) 25 MG tablet Take 1 tablet by mouth 3 (Three) Times a Day As Needed for dizziness. 30 tablet 0   • omeprazole (priLOSEC) 20 MG capsule TAKE ONE CAPSULE BY MOUTH ONCE DAILY 90 capsule 1   • oxyCODONE-acetaminophen (PERCOCET) 7.5-325 MG per tablet Take 1 tablet by mouth Every 4 (Four) Hours As Needed for moderate pain (4-6). 90 tablet 0   • predniSONE (DELTASONE) 5 MG tablet      • sitaGLIPtin (JANUVIA) 100 MG tablet Take 100 mg by mouth daily.     • vitamin B-12 (CYANOCOBALAMIN) 100 MCG tablet Take 50 mcg by mouth daily.     • vitamin D (ERGOCALCIFEROL) 66445 UNITS capsule capsule      • zolpidem (AMBIEN) 5 MG tablet Take 1 tablet by mouth At Night As Needed for Sleep. 90 tablet 1   • amitriptyline (ELAVIL) 100 MG tablet Take 1 tablet by mouth Every Night. 90 tablet 3   • metFORMIN (GLUCOPHAGE) 500 MG tablet Take 500 mg by mouth 2 (Two) Times a Day With Meals.       No facility-administered medications prior to visit.      No orders of the defined types were placed in this encounter.    Medications Discontinued During This Encounter   Medication Reason   • metFORMIN (GLUCOPHAGE) 500 MG tablet Therapy completed   • amitriptyline (ELAVIL) 100 MG tablet  Therapy completed         Return in about 3 months (around 4/22/2018) for Recheck, labs.

## 2018-03-15 ENCOUNTER — OFFICE VISIT (OUTPATIENT)
Dept: CARDIOLOGY | Facility: CLINIC | Age: 73
End: 2018-03-15

## 2018-03-15 VITALS
WEIGHT: 177.6 LBS | BODY MASS INDEX: 34.87 KG/M2 | HEART RATE: 91 BPM | HEIGHT: 60 IN | DIASTOLIC BLOOD PRESSURE: 82 MMHG | SYSTOLIC BLOOD PRESSURE: 120 MMHG

## 2018-03-15 DIAGNOSIS — E11.8 TYPE 2 DIABETES MELLITUS WITH COMPLICATION, WITHOUT LONG-TERM CURRENT USE OF INSULIN (HCC): ICD-10-CM

## 2018-03-15 DIAGNOSIS — R94.31 ABNORMAL EKG: Primary | ICD-10-CM

## 2018-03-15 PROCEDURE — 99204 OFFICE O/P NEW MOD 45 MIN: CPT | Performed by: INTERNAL MEDICINE

## 2018-03-15 PROCEDURE — 93000 ELECTROCARDIOGRAM COMPLETE: CPT | Performed by: INTERNAL MEDICINE

## 2018-03-15 RX ORDER — IBUPROFEN 200 MG
200 TABLET ORAL
COMMUNITY

## 2018-03-15 NOTE — PROGRESS NOTES
Date of Office Visit: 03/15/2018  Encounter Provider: Cong Cedeno MD  Place of Service: [facility] CARDIOLOGY  Patient Name: Lydia John  :1945    Chief Complaint   Patient presents with   • Abnormal ECG   :     HPI: Lydia John is a 72 y.o. female who presents today at the request of Dr. Davis regarding an abnormal EKG.      She has type 2 diabetes.  She developed diabetic amyotrophy two years ago, which is a very painful condition that results in severe muscle wasting and weakness.  She is generally in a wheelchair, although she can walk short distances with assistance at home.  She has hyperlipidemia, but is not able to take a statin due to the amyotrophy.      She recently reported to Dr. Davis that she had an ache in her left biceps, near the elbow.  It is there when she holds her arms up for a little while.  It is non-radiating and is not associated with dyspnea, diaphoresis, or nausea.  She has not had chest discomfort.  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    Her father had an MI and CABG in his early 60s; her mother had a CABG in her late 60s.    Past Medical History:   Diagnosis Date   • Chronic edema    • Diabetic amyotrophy     associated with other specified diabetes mellitus   • Hyponatremia 2016   • Mixed hyperlipidemia    • Peripheral polyneuropathy 2016   • Post-menopausal 2016   • Situational depression 2016   • Type 2 diabetes mellitus with complication, without long-term current use of insulin    • Vitamin D deficiency 2016       Past Surgical History:   Procedure Laterality Date   • NO PAST SURGERIES         Social History     Social History   • Marital status:      Spouse name: N/A   • Number of children: 2   • Years of education: N/A     Occupational History   •       Social History Main Topics   • Smoking status: Never Smoker   • Smokeless tobacco: Not on file   • Alcohol  use No   • Drug use: Unknown   • Sexual activity: Not on file     Other Topics Concern   • Not on file     Social History Narrative   • No narrative on file       Family History   Problem Relation Age of Onset   • Heart disease Mother      s/p cabg x 2   • Lung disease Mother    • Stroke Mother    • Glaucoma Mother    • Hypertension Mother    • Heart disease Father 60   • Diabetes Father    • Stroke Father    • Ovarian cancer Maternal Aunt    • Colon cancer Maternal Grandmother    • Diabetes Paternal Grandmother    • Heart disease Paternal Grandmother    • No Known Problems Brother        Review of Systems   Cardiovascular: Positive for leg swelling.   Skin: Positive for unusual hair distribution.   Musculoskeletal: Positive for muscle weakness and myalgias.   All other systems reviewed and are negative.      No Known Allergies      Current Outpatient Prescriptions:   •  amitriptyline (ELAVIL) 75 MG tablet, Take  by mouth Every Night., Disp: , Rfl:   •  calcium citrate-vitamin d (CITRACAL) 200-250 MG-UNIT tablet tablet, Take 1 tablet by mouth daily., Disp: , Rfl:   •  docusate sodium (COLACE) 100 MG capsule, Take 100 mg by mouth 3 (Three) Times a Day As Needed for constipation., Disp: , Rfl:   •  DULoxetine (CYMBALTA) 60 MG capsule, Take 1 capsule by mouth Daily., Disp: 90 capsule, Rfl: 3  •  ibuprofen (ADVIL,MOTRIN) 200 MG tablet, Take 200 mg by mouth., Disp: , Rfl:   •  metFORMIN (GLUCOPHAGE) 500 MG tablet, Take 250 mg by mouth 2 (Two) Times a Day With Meals., Disp: , Rfl:   •  omeprazole (priLOSEC) 20 MG capsule, TAKE ONE CAPSULE BY MOUTH ONCE DAILY, Disp: 90 capsule, Rfl: 1  •  oxyCODONE-acetaminophen (PERCOCET) 7.5-325 MG per tablet, Take 1 tablet by mouth Every 4 (Four) Hours As Needed for moderate pain (4-6)., Disp: 90 tablet, Rfl: 0  •  SITagliptin (JANUVIA) 100 MG tablet, Take 1 tablet by mouth Daily., Disp: 90 tablet, Rfl: 1  •  vitamin B-12 (CYANOCOBALAMIN) 100 MCG tablet, Take 50 mcg by mouth daily.,  Disp: , Rfl:   •  vitamin D (ERGOCALCIFEROL) 80019 UNITS capsule capsule, , Disp: , Rfl:   •  zolpidem (AMBIEN) 5 MG tablet, Take 1 tablet by mouth At Night As Needed for Sleep., Disp: 90 tablet, Rfl: 1      Objective:     There were no vitals filed for this visit.  There is no height or weight on file to calculate BMI.    Physical Exam   Constitutional: She is oriented to person, place, and time.   Obese, in a wheelchair   HENT:   Head: Normocephalic.   Nose: Nose normal.   Mouth/Throat: Oropharynx is clear and moist.   Eyes: Conjunctivae and EOM are normal. Pupils are equal, round, and reactive to light.   Neck: Normal range of motion.   Cannot assess for JVD due to habitus   Cardiovascular: Normal rate, regular rhythm, normal heart sounds and intact distal pulses.    No murmur heard.  Pulmonary/Chest: Effort normal.   Abdominal: Soft.   Obesity limits abdominal exam   Musculoskeletal: She exhibits edema. Tenderness: doughy nonpitting leg swelling.   Neurological: She is alert and oriented to person, place, and time. No cranial nerve deficit. She exhibits abnormal muscle tone.   Skin: Skin is warm and dry.   Psychiatric: She has a normal mood and affect. Her behavior is normal. Judgment and thought content normal.   Vitals reviewed.        ECG 12 Lead  Date/Time: 3/15/2018 3:20 PM  Performed by: GIULIANO CHRISTENSEN  Authorized by: GIULIANO CHRISTENSEN   Comparison: compared with previous ECG   Similar to previous ECG  Rhythm: sinus rhythm  Conduction: conduction normal  ST Segments: ST segments normal  T Waves: T waves normal  QRS axis: normal  Other findings: LAE and PRWP  Clinical impression: non-specific ECG              Assessment:       Diagnosis Plan   1. Abnormal EKG     2. Type 2 diabetes mellitus with complication, without long-term current use of insulin            Plan:       The initial EKG from Dr. Davis's office showed nondiagnostic Q waves in III and AVF that do not meet criteria for a prior infarction.  The EKG  from today shows poor R wave progression.  Her arm discomfort seems quite atypical for angina.  I have recommended an echocardiogram to exclude any wall motion abnormality.  I do not feel we need to proceed with stress testing at this point.     Sincerely,       Cong Cedeno MD

## 2018-03-27 ENCOUNTER — HOSPITAL ENCOUNTER (OUTPATIENT)
Dept: CARDIOLOGY | Facility: HOSPITAL | Age: 73
Discharge: HOME | End: 2018-03-27
Attending: INTERNAL MEDICINE | Admitting: INTERNAL MEDICINE

## 2018-03-27 VITALS — WEIGHT: 177 LBS | HEART RATE: 90 BPM | BODY MASS INDEX: 34.75 KG/M2 | HEIGHT: 60 IN

## 2018-03-27 DIAGNOSIS — F43.21 SITUATIONAL DEPRESSION: ICD-10-CM

## 2018-03-27 DIAGNOSIS — R94.31 ABNORMAL EKG: ICD-10-CM

## 2018-03-27 DIAGNOSIS — E11.44 DIABETIC AMYOTROPHY ASSOCIATED WITH TYPE 2 DIABETES MELLITUS (HCC): ICD-10-CM

## 2018-03-27 DIAGNOSIS — R93.1 ABNORMAL ECHOCARDIOGRAM: Primary | ICD-10-CM

## 2018-03-27 LAB
ASCENDING AORTA: 3.5 CM
BH CV ECHO MEAS - ACS: 1.9 CM
BH CV ECHO MEAS - AO MAX PG (FULL): 2.8 MMHG
BH CV ECHO MEAS - AO MAX PG: 5.9 MMHG
BH CV ECHO MEAS - AO MEAN PG (FULL): 0.95 MMHG
BH CV ECHO MEAS - AO MEAN PG: 2.4 MMHG
BH CV ECHO MEAS - AO ROOT AREA (BSA CORRECTED): 1.9
BH CV ECHO MEAS - AO ROOT AREA: 8.9 CM^2
BH CV ECHO MEAS - AO ROOT DIAM: 3.4 CM
BH CV ECHO MEAS - AO V2 MAX: 121.6 CM/SEC
BH CV ECHO MEAS - AO V2 MEAN: 69.6 CM/SEC
BH CV ECHO MEAS - AO V2 VTI: 23.5 CM
BH CV ECHO MEAS - ASC AORTA: 3.5 CM
BH CV ECHO MEAS - AVA(I,A): 1.6 CM^2
BH CV ECHO MEAS - AVA(I,D): 1.6 CM^2
BH CV ECHO MEAS - AVA(V,A): 1.8 CM^2
BH CV ECHO MEAS - AVA(V,D): 1.8 CM^2
BH CV ECHO MEAS - BSA(HAYCOCK): 1.9 M^2
BH CV ECHO MEAS - BSA: 1.8 M^2
BH CV ECHO MEAS - BZI_BMI: 34.6 KILOGRAMS/M^2
BH CV ECHO MEAS - BZI_METRIC_HEIGHT: 152.4 CM
BH CV ECHO MEAS - BZI_METRIC_WEIGHT: 80.3 KG
BH CV ECHO MEAS - CONTRAST EF (2CH): 63.6 ML/M^2
BH CV ECHO MEAS - CONTRAST EF 4CH: 67.7 ML/M^2
BH CV ECHO MEAS - EDV(MOD-SP2): 55 ML
BH CV ECHO MEAS - EDV(MOD-SP4): 65 ML
BH CV ECHO MEAS - EDV(TEICH): 134.7 ML
BH CV ECHO MEAS - EF(CUBED): 54.5 %
BH CV ECHO MEAS - EF(MOD-SP2): 63.6 %
BH CV ECHO MEAS - EF(MOD-SP4): 67.7 %
BH CV ECHO MEAS - EF(TEICH): 45.9 %
BH CV ECHO MEAS - ESV(MOD-SP2): 20 ML
BH CV ECHO MEAS - ESV(MOD-SP4): 21 ML
BH CV ECHO MEAS - ESV(TEICH): 72.9 ML
BH CV ECHO MEAS - FS: 23.1 %
BH CV ECHO MEAS - IVS/LVPW: 1.1
BH CV ECHO MEAS - IVSD: 1.3 CM
BH CV ECHO MEAS - LAT PEAK E' VEL: 8 CM/SEC
BH CV ECHO MEAS - LV DIASTOLIC VOL/BSA (35-75): 36.7 ML/M^2
BH CV ECHO MEAS - LV MASS(C)D: 255.7 GRAMS
BH CV ECHO MEAS - LV MASS(C)DI: 144.3 GRAMS/M^2
BH CV ECHO MEAS - LV MAX PG: 3.1 MMHG
BH CV ECHO MEAS - LV MEAN PG: 1.5 MMHG
BH CV ECHO MEAS - LV SYSTOLIC VOL/BSA (12-30): 11.8 ML/M^2
BH CV ECHO MEAS - LV V1 MAX: 88.3 CM/SEC
BH CV ECHO MEAS - LV V1 MEAN: 52 CM/SEC
BH CV ECHO MEAS - LV V1 VTI: 15.4 CM
BH CV ECHO MEAS - LVIDD: 5.3 CM
BH CV ECHO MEAS - LVIDS: 4.1 CM
BH CV ECHO MEAS - LVLD AP2: 6.9 CM
BH CV ECHO MEAS - LVLD AP4: 6.9 CM
BH CV ECHO MEAS - LVLS AP2: 6.2 CM
BH CV ECHO MEAS - LVLS AP4: 5.1 CM
BH CV ECHO MEAS - LVOT AREA (M): 2.5 CM^2
BH CV ECHO MEAS - LVOT AREA: 2.5 CM^2
BH CV ECHO MEAS - LVOT DIAM: 1.8 CM
BH CV ECHO MEAS - LVPWD: 1.1 CM
BH CV ECHO MEAS - MED PEAK E' VEL: 8 CM/SEC
BH CV ECHO MEAS - MV A DUR: 0.09 SEC
BH CV ECHO MEAS - MV A MAX VEL: 99.4 CM/SEC
BH CV ECHO MEAS - MV DEC SLOPE: 248.8 CM/SEC^2
BH CV ECHO MEAS - MV DEC TIME: 0.23 SEC
BH CV ECHO MEAS - MV E MAX VEL: 59.2 CM/SEC
BH CV ECHO MEAS - MV E/A: 0.6
BH CV ECHO MEAS - MV MAX PG: 6.6 MMHG
BH CV ECHO MEAS - MV MEAN PG: 2.7 MMHG
BH CV ECHO MEAS - MV P1/2T MAX VEL: 59.7 CM/SEC
BH CV ECHO MEAS - MV P1/2T: 70.2 MSEC
BH CV ECHO MEAS - MV V2 MAX: 128.1 CM/SEC
BH CV ECHO MEAS - MV V2 MEAN: 77 CM/SEC
BH CV ECHO MEAS - MV V2 VTI: 28.8 CM
BH CV ECHO MEAS - MVA P1/2T LCG: 3.7 CM^2
BH CV ECHO MEAS - MVA(P1/2T): 3.1 CM^2
BH CV ECHO MEAS - MVA(VTI): 1.3 CM^2
BH CV ECHO MEAS - PA ACC TIME: 0.12 SEC
BH CV ECHO MEAS - PA MAX PG (FULL): 2 MMHG
BH CV ECHO MEAS - PA MAX PG: 3.8 MMHG
BH CV ECHO MEAS - PA PR(ACCEL): 23.5 MMHG
BH CV ECHO MEAS - PA V2 MAX: 97.7 CM/SEC
BH CV ECHO MEAS - PULM A REVS DUR: 0.11 SEC
BH CV ECHO MEAS - PULM A REVS VEL: 36.9 CM/SEC
BH CV ECHO MEAS - PULM DIAS VEL: 25.7 CM/SEC
BH CV ECHO MEAS - PULM S/D: 1.4
BH CV ECHO MEAS - PULM SYS VEL: 35.9 CM/SEC
BH CV ECHO MEAS - PVA(V,A): 2.3 CM^2
BH CV ECHO MEAS - PVA(V,D): 2.3 CM^2
BH CV ECHO MEAS - QP/QS: 1.3
BH CV ECHO MEAS - RV MAX PG: 1.8 MMHG
BH CV ECHO MEAS - RV MEAN PG: 1.2 MMHG
BH CV ECHO MEAS - RV V1 MAX: 67.9 CM/SEC
BH CV ECHO MEAS - RV V1 MEAN: 51 CM/SEC
BH CV ECHO MEAS - RV V1 VTI: 14.7 CM
BH CV ECHO MEAS - RVOT AREA: 3.3 CM^2
BH CV ECHO MEAS - RVOT DIAM: 2.1 CM
BH CV ECHO MEAS - SI(AO): 118.6 ML/M^2
BH CV ECHO MEAS - SI(CUBED): 45.5 ML/M^2
BH CV ECHO MEAS - SI(LVOT): 21.8 ML/M^2
BH CV ECHO MEAS - SI(MOD-SP2): 19.7 ML/M^2
BH CV ECHO MEAS - SI(MOD-SP4): 24.8 ML/M^2
BH CV ECHO MEAS - SI(TEICH): 34.9 ML/M^2
BH CV ECHO MEAS - SUP REN AO DIAM: 1.8 CM
BH CV ECHO MEAS - SV(AO): 210.2 ML
BH CV ECHO MEAS - SV(CUBED): 80.6 ML
BH CV ECHO MEAS - SV(LVOT): 38.7 ML
BH CV ECHO MEAS - SV(MOD-SP2): 35 ML
BH CV ECHO MEAS - SV(MOD-SP4): 44 ML
BH CV ECHO MEAS - SV(RVOT): 49.4 ML
BH CV ECHO MEAS - SV(TEICH): 61.8 ML
BH CV ECHO MEAS - TAPSE (>1.6): 3.1 CM2
BH CV XLRA - RV BASE: 2.6 CM
BH CV XLRA - TDI S': 10 CM/SEC
E/E' RATIO: 7
LEFT ATRIUM VOLUME INDEX: 10 ML/M2
SINUS: 3.2 CM
STJ: 2.9 CM

## 2018-03-27 PROCEDURE — 93306 TTE W/DOPPLER COMPLETE: CPT

## 2018-03-27 PROCEDURE — 25010000002 PERFLUTREN (DEFINITY) 8.476 MG IN SODIUM CHLORIDE 0.9 % 10 ML INJECTION: Performed by: INTERNAL MEDICINE

## 2018-03-27 PROCEDURE — 93306 TTE W/DOPPLER COMPLETE: CPT | Performed by: INTERNAL MEDICINE

## 2018-03-27 RX ORDER — DULOXETIN HYDROCHLORIDE 60 MG/1
CAPSULE, DELAYED RELEASE ORAL
Qty: 90 CAPSULE | Refills: 3 | Status: SHIPPED | OUTPATIENT
Start: 2018-03-27 | End: 2019-04-04 | Stop reason: SDUPTHER

## 2018-03-27 RX ADMIN — PERFLUTREN 1.5 ML: 6.52 INJECTION, SUSPENSION INTRAVENOUS at 14:34

## 2018-04-12 ENCOUNTER — HOSPITAL ENCOUNTER (OUTPATIENT)
Dept: CT IMAGING | Facility: HOSPITAL | Age: 73
Discharge: HOME | End: 2018-04-12
Attending: INTERNAL MEDICINE | Admitting: INTERNAL MEDICINE

## 2018-04-12 LAB — CREAT BLDA-MCNC: 0.7 MG/DL (ref 0.6–1.3)

## 2018-04-12 PROCEDURE — 82565 ASSAY OF CREATININE: CPT

## 2018-04-12 PROCEDURE — 71275 CT ANGIOGRAPHY CHEST: CPT

## 2018-04-12 PROCEDURE — 25010000002 IOPAMIDOL 61 % SOLUTION: Performed by: INTERNAL MEDICINE

## 2018-04-12 RX ADMIN — IOPAMIDOL 95 ML: 612 INJECTION, SOLUTION INTRAVENOUS at 13:25

## 2018-04-24 ENCOUNTER — OFFICE VISIT (OUTPATIENT)
Dept: INTERNAL MEDICINE | Facility: CLINIC | Age: 73
End: 2018-04-24

## 2018-04-24 VITALS
DIASTOLIC BLOOD PRESSURE: 84 MMHG | HEIGHT: 60 IN | SYSTOLIC BLOOD PRESSURE: 126 MMHG | HEART RATE: 92 BPM | OXYGEN SATURATION: 95 %

## 2018-04-24 DIAGNOSIS — M62.81 PROXIMAL LIMB MUSCLE WEAKNESS: ICD-10-CM

## 2018-04-24 DIAGNOSIS — E13.44 DIABETIC AMYOTROPHY ASSOCIATED WITH OTHER SPECIFIED DIABETES MELLITUS (HCC): Primary | ICD-10-CM

## 2018-04-24 DIAGNOSIS — E78.2 MIXED HYPERLIPIDEMIA: ICD-10-CM

## 2018-04-24 DIAGNOSIS — G62.9 PERIPHERAL POLYNEUROPATHY: ICD-10-CM

## 2018-04-24 DIAGNOSIS — E55.9 VITAMIN D DEFICIENCY: ICD-10-CM

## 2018-04-24 DIAGNOSIS — G47.9 SLEEP DIFFICULTIES: ICD-10-CM

## 2018-04-24 DIAGNOSIS — G82.20 PARAPARESIS (HCC): ICD-10-CM

## 2018-04-24 DIAGNOSIS — E11.8 TYPE 2 DIABETES MELLITUS WITH COMPLICATION, WITHOUT LONG-TERM CURRENT USE OF INSULIN (HCC): ICD-10-CM

## 2018-04-24 LAB
25(OH)D3+25(OH)D2 SERPL-MCNC: 20.9 NG/ML
ALBUMIN SERPL-MCNC: 4 G/DL (ref 3.5–5.2)
ALBUMIN/GLOB SERPL: 1.5 G/DL
ALP SERPL-CCNC: 151 U/L (ref 40–129)
ALT SERPL-CCNC: 25 U/L (ref 5–33)
AST SERPL-CCNC: 21 U/L (ref 5–32)
BASOPHILS # BLD AUTO: 0.01 10*3/MM3 (ref 0–0.2)
BASOPHILS NFR BLD AUTO: 0.1 % (ref 0–2)
BILIRUB SERPL-MCNC: 0.2 MG/DL (ref 0.2–1.2)
BUN SERPL-MCNC: 18 MG/DL (ref 8–23)
BUN/CREAT SERPL: 22.2 (ref 7–25)
CALCIUM SERPL-MCNC: 9.6 MG/DL (ref 8.8–10.5)
CHLORIDE SERPL-SCNC: 97 MMOL/L (ref 98–107)
CHOLEST SERPL-MCNC: 283 MG/DL (ref 0–200)
CO2 SERPL-SCNC: 27.6 MMOL/L (ref 22–29)
CREAT SERPL-MCNC: 0.81 MG/DL (ref 0.57–1)
EOSINOPHIL # BLD AUTO: 0.36 10*3/MM3 (ref 0.1–0.3)
EOSINOPHIL NFR BLD AUTO: 4.3 % (ref 0–4)
ERYTHROCYTE [DISTWIDTH] IN BLOOD BY AUTOMATED COUNT: 13.7 % (ref 11.5–14.5)
GFR SERPLBLD CREATININE-BSD FMLA CKD-EPI: 70 ML/MIN/1.73
GFR SERPLBLD CREATININE-BSD FMLA CKD-EPI: 84 ML/MIN/1.73
GLOBULIN SER CALC-MCNC: 2.7 GM/DL
GLUCOSE SERPL-MCNC: 215 MG/DL (ref 65–99)
HBA1C MFR BLD: 6.2 % (ref 4.8–5.6)
HCT VFR BLD AUTO: 39.4 % (ref 37–47)
HDLC SERPL-MCNC: 51 MG/DL (ref 40–60)
HGB BLD-MCNC: 12.9 G/DL (ref 12–16)
IMM GRANULOCYTES # BLD: 0.06 10*3/MM3 (ref 0–0.03)
IMM GRANULOCYTES NFR BLD: 0.7 % (ref 0–0.5)
LDLC SERPL CALC-MCNC: 155 MG/DL (ref 0–100)
LDLC/HDLC SERPL: 3.04 {RATIO}
LYMPHOCYTES # BLD AUTO: 1.64 10*3/MM3 (ref 0.6–4.8)
LYMPHOCYTES NFR BLD AUTO: 19.7 % (ref 20–45)
MCH RBC QN AUTO: 29.7 PG (ref 27–31)
MCHC RBC AUTO-ENTMCNC: 32.7 G/DL (ref 31–37)
MCV RBC AUTO: 90.8 FL (ref 81–99)
MONOCYTES # BLD AUTO: 0.52 10*3/MM3 (ref 0–1)
MONOCYTES NFR BLD AUTO: 6.3 % (ref 3–8)
NEUTROPHILS # BLD AUTO: 5.72 10*3/MM3 (ref 1.5–8.3)
NEUTROPHILS NFR BLD AUTO: 68.9 % (ref 45–70)
NRBC BLD AUTO-RTO: 0 /100 WBC (ref 0–0)
PLATELET # BLD AUTO: 266 10*3/MM3 (ref 140–500)
POTASSIUM SERPL-SCNC: 4.7 MMOL/L (ref 3.5–5.2)
PROT SERPL-MCNC: 6.7 G/DL (ref 6–8.5)
RBC # BLD AUTO: 4.34 10*6/MM3 (ref 4.2–5.4)
SODIUM SERPL-SCNC: 139 MMOL/L (ref 136–145)
TRIGL SERPL-MCNC: 385 MG/DL (ref 0–150)
VIT B12 SERPL-MCNC: >2000 PG/ML (ref 232–1245)
VLDLC SERPL CALC-MCNC: 77 MG/DL (ref 7–27)
WBC # BLD AUTO: 8.31 10*3/MM3 (ref 4.8–10.8)

## 2018-04-24 PROCEDURE — 99214 OFFICE O/P EST MOD 30 MIN: CPT | Performed by: INTERNAL MEDICINE

## 2018-04-24 RX ORDER — ZOLPIDEM TARTRATE 5 MG/1
5 TABLET ORAL NIGHTLY PRN
Qty: 90 TABLET | Refills: 1 | Status: SHIPPED | OUTPATIENT
Start: 2018-04-24 | End: 2018-10-30 | Stop reason: SDUPTHER

## 2018-04-24 NOTE — PROGRESS NOTES
Lydia John is a 72 y.o. female, who presents with a chief complaint of   Chief Complaint   Patient presents with   • Follow-up   • Hyperlipidemia       HPI   The pt is here for follow up.  Since  Her last OV she saw Dr. Cedeno bc of an abnormal EKG.  She got a ct angio chest that ws ok.  She did have extensive cholelithiasis.      DM - glucoses have been well controlled.  No hypoglycemia.    Diabetic Amyotrophy - pt saw dr. randolph recently.  She also follows with pain management which recommended CBD oil.  She just got her first bottle today.  Dr. randolph is worried that her sx may be starting to plateau and wants her to restart physical therapy.  She walks some with her walker at home and she can lift her feet some now.      She has had some LE edema.  She is in bed a lot when home so the swelling isnt so bad.  Sx worse when her feet are down.      Insomnia - doing well with ambien.  Due for refill.      The following portions of the patient's history were reviewed and updated as appropriate: allergies, current medications, past family history, past medical history, past social history, past surgical history and problem list.    Allergies: Review of patient's allergies indicates no known allergies.    Review of Systems   HENT: Negative.    Eyes: Negative.    Respiratory: Negative.    Cardiovascular: Negative.    Gastrointestinal: Negative.    Endocrine: Negative.    Genitourinary: Negative.    Musculoskeletal: Negative.    Skin: Negative.    Allergic/Immunologic: Negative.    Neurological: Positive for weakness.   Hematological: Negative.    Psychiatric/Behavioral: Negative.    All other systems reviewed and are negative.            Wt Readings from Last 3 Encounters:   03/27/18 80.3 kg (177 lb)   03/15/18 80.6 kg (177 lb 9.6 oz)   01/22/18 80.5 kg (177 lb 6.4 oz)     Temp Readings from Last 3 Encounters:   06/29/16 98.1 °F (36.7 °C)   05/31/16 98 °F (36.7 °C)     BP Readings from Last 3 Encounters:    04/24/18 126/84   03/15/18 120/82   01/22/18 112/78     Pulse Readings from Last 3 Encounters:   04/24/18 92   03/27/18 90   03/15/18 91     There is no height or weight on file to calculate BMI.  @LASTSAO2(3)@    Physical Exam   Constitutional: She is oriented to person, place, and time. She appears well-developed and well-nourished. No distress.   HENT:   Head: Normocephalic and atraumatic.   Right Ear: External ear normal.   Left Ear: External ear normal.   Nose: Nose normal.   Mouth/Throat: Oropharynx is clear and moist.   Eyes: Conjunctivae and EOM are normal. Pupils are equal, round, and reactive to light.   Neck: Normal range of motion. Neck supple.   Cardiovascular: Normal rate, regular rhythm, normal heart sounds and intact distal pulses.    Pulmonary/Chest: Effort normal and breath sounds normal. No respiratory distress. She has no wheezes.   Musculoskeletal: She exhibits edema.   Pt in wheelchair  1+ bilat LE edema   Neurological: She is alert and oriented to person, place, and time.   Skin: Skin is warm and dry.   Psychiatric: She has a normal mood and affect. Her behavior is normal. Judgment and thought content normal.   Nursing note and vitals reviewed.      Results for orders placed or performed during the hospital encounter of 04/12/18   POC Creatinine   Result Value Ref Range    Creatinine 0.70 0.60 - 1.30 mg/dL           Lydia was seen today for follow-up and hyperlipidemia.    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  -     Ambulatory Referral to Home Health    Type 2 diabetes mellitus with complication, without long-term current use of insulin  -     Ambulatory Referral to Home Health  -     Comprehensive Metabolic Panel; Future  -     CBC & Differential; Future  -     Lipid Panel With LDL / HDL Ratio; Future  -     Hemoglobin A1c; Future    Mixed hyperlipidemia  -     Comprehensive Metabolic Panel; Future  -     Lipid  Panel With LDL / HDL Ratio; Future    Paraparesis    Peripheral polyneuropathy  -     Comprehensive Metabolic Panel; Future  -     CBC & Differential; Future  -     Vitamin B12; Future    Proximal limb muscle weakness    Sleep difficulties  -     zolpidem (AMBIEN) 5 MG tablet; Take 1 tablet by mouth At Night As Needed for Sleep.    Vitamin D deficiency  -     Vitamin D 25 Hydroxy; Future          Outpatient Medications Prior to Visit   Medication Sig Dispense Refill   • amitriptyline (ELAVIL) 75 MG tablet Take  by mouth Every Night.     • calcium citrate-vitamin d (CITRACAL) 200-250 MG-UNIT tablet tablet Take 1 tablet by mouth daily.     • docusate sodium (COLACE) 100 MG capsule Take 100 mg by mouth 3 (Three) Times a Day As Needed for constipation.     • DULoxetine (CYMBALTA) 60 MG capsule TAKE ONE CAPSULE BY MOUTH ONCE DAILY 90 capsule 3   • ibuprofen (ADVIL,MOTRIN) 200 MG tablet Take 200 mg by mouth.     • metFORMIN (GLUCOPHAGE) 500 MG tablet Take 250 mg by mouth 2 (Two) Times a Day With Meals.     • omeprazole (priLOSEC) 20 MG capsule TAKE ONE CAPSULE BY MOUTH ONCE DAILY 90 capsule 1   • oxyCODONE-acetaminophen (PERCOCET) 7.5-325 MG per tablet Take 1 tablet by mouth Every 4 (Four) Hours As Needed for moderate pain (4-6). 90 tablet 0   • SITagliptin (JANUVIA) 100 MG tablet Take 1 tablet by mouth Daily. 90 tablet 1   • vitamin B-12 (CYANOCOBALAMIN) 100 MCG tablet Take 50 mcg by mouth daily.     • vitamin D (ERGOCALCIFEROL) 28678 UNITS capsule capsule      • zolpidem (AMBIEN) 5 MG tablet Take 1 tablet by mouth At Night As Needed for Sleep. 90 tablet 1     No facility-administered medications prior to visit.      New Medications Ordered This Visit   Medications   • zolpidem (AMBIEN) 5 MG tablet     Sig: Take 1 tablet by mouth At Night As Needed for Sleep.     Dispense:  90 tablet     Refill:  1     [unfilled]  Medications Discontinued During This Encounter   Medication Reason   • zolpidem (AMBIEN) 5 MG tablet  Reorder         Return in about 3 months (around 7/24/2018) for Recheck, labs.

## 2018-05-04 ENCOUNTER — TELEPHONE (OUTPATIENT)
Dept: INTERNAL MEDICINE | Facility: CLINIC | Age: 73
End: 2018-05-04

## 2018-05-04 NOTE — TELEPHONE ENCOUNTER
----- Message from Selena Davis MD sent at 4/27/2018  4:00 PM EDT -----  Call pt about labs.  a1c 6.2.  Cont healthy diet and same meds.  Other labs stable.     Pt given info.dg

## 2018-06-09 DIAGNOSIS — K29.00 ACUTE GASTRITIS WITHOUT HEMORRHAGE, UNSPECIFIED GASTRITIS TYPE: ICD-10-CM

## 2018-06-11 RX ORDER — OMEPRAZOLE 20 MG/1
CAPSULE, DELAYED RELEASE ORAL
Qty: 90 CAPSULE | Refills: 1 | Status: SHIPPED | OUTPATIENT
Start: 2018-06-11 | End: 2018-10-30 | Stop reason: SDUPTHER

## 2018-07-05 ENCOUNTER — TELEPHONE (OUTPATIENT)
Dept: INTERNAL MEDICINE | Facility: CLINIC | Age: 73
End: 2018-07-05

## 2018-07-05 NOTE — TELEPHONE ENCOUNTER
See scanned note(7/5/18) OK to refill per Dr. Davis.  Patients daughter stated when she called that she spoke with Dr. Davis via text while she was on vacation. Spoke with Dr. Davis after she returned from vacation, approved. Meds sent.

## 2018-07-24 ENCOUNTER — RESULTS ENCOUNTER (OUTPATIENT)
Dept: INTERNAL MEDICINE | Facility: CLINIC | Age: 73
End: 2018-07-24

## 2018-07-24 ENCOUNTER — OFFICE VISIT (OUTPATIENT)
Dept: INTERNAL MEDICINE | Facility: CLINIC | Age: 73
End: 2018-07-24

## 2018-07-24 VITALS
WEIGHT: 170 LBS | BODY MASS INDEX: 33.38 KG/M2 | HEIGHT: 60 IN | HEART RATE: 82 BPM | DIASTOLIC BLOOD PRESSURE: 80 MMHG | SYSTOLIC BLOOD PRESSURE: 122 MMHG | OXYGEN SATURATION: 95 %

## 2018-07-24 DIAGNOSIS — Z12.11 COLON CANCER SCREENING: ICD-10-CM

## 2018-07-24 DIAGNOSIS — Z12.31 ENCOUNTER FOR SCREENING MAMMOGRAM FOR BREAST CANCER: ICD-10-CM

## 2018-07-24 DIAGNOSIS — E78.2 MIXED HYPERLIPIDEMIA: ICD-10-CM

## 2018-07-24 DIAGNOSIS — M62.81 PROXIMAL LIMB MUSCLE WEAKNESS: ICD-10-CM

## 2018-07-24 DIAGNOSIS — E13.44 DIABETIC AMYOTROPHY ASSOCIATED WITH OTHER SPECIFIED DIABETES MELLITUS (HCC): ICD-10-CM

## 2018-07-24 DIAGNOSIS — Z78.0 POST-MENOPAUSAL: ICD-10-CM

## 2018-07-24 DIAGNOSIS — E11.8 TYPE 2 DIABETES MELLITUS WITH COMPLICATION, WITHOUT LONG-TERM CURRENT USE OF INSULIN (HCC): Primary | ICD-10-CM

## 2018-07-24 LAB
ALBUMIN SERPL-MCNC: 4.3 G/DL (ref 3.5–5.2)
ALBUMIN/GLOB SERPL: 1.7 G/DL
ALP SERPL-CCNC: 153 U/L (ref 40–129)
ALT SERPL-CCNC: 27 U/L (ref 5–33)
AST SERPL-CCNC: 20 U/L (ref 5–32)
BASOPHILS # BLD AUTO: 0.02 10*3/MM3 (ref 0–0.2)
BASOPHILS NFR BLD AUTO: 0.2 % (ref 0–2)
BILIRUB SERPL-MCNC: 0.3 MG/DL (ref 0.2–1.2)
BUN SERPL-MCNC: 13 MG/DL (ref 8–23)
BUN/CREAT SERPL: 21.3 (ref 7–25)
CALCIUM SERPL-MCNC: 9.7 MG/DL (ref 8.8–10.5)
CHLORIDE SERPL-SCNC: 97 MMOL/L (ref 98–107)
CO2 SERPL-SCNC: 29.5 MMOL/L (ref 22–29)
CREAT SERPL-MCNC: 0.61 MG/DL (ref 0.57–1)
EOSINOPHIL # BLD AUTO: 0.28 10*3/MM3 (ref 0.1–0.3)
EOSINOPHIL NFR BLD AUTO: 3 % (ref 0–4)
ERYTHROCYTE [DISTWIDTH] IN BLOOD BY AUTOMATED COUNT: 13.2 % (ref 11.5–14.5)
GLOBULIN SER CALC-MCNC: 2.5 GM/DL
GLUCOSE SERPL-MCNC: 203 MG/DL (ref 65–99)
HBA1C MFR BLD: 7.3 % (ref 4.8–5.6)
HCT VFR BLD AUTO: 41.5 % (ref 37–47)
HGB BLD-MCNC: 13.8 G/DL (ref 12–16)
IMM GRANULOCYTES # BLD: 0.05 10*3/MM3 (ref 0–0.03)
IMM GRANULOCYTES NFR BLD: 0.5 % (ref 0–0.5)
LYMPHOCYTES # BLD AUTO: 1.65 10*3/MM3 (ref 0.6–4.8)
LYMPHOCYTES NFR BLD AUTO: 17.7 % (ref 20–45)
MCH RBC QN AUTO: 30.1 PG (ref 27–31)
MCHC RBC AUTO-ENTMCNC: 33.3 G/DL (ref 31–37)
MCV RBC AUTO: 90.6 FL (ref 81–99)
MONOCYTES # BLD AUTO: 0.55 10*3/MM3 (ref 0–1)
MONOCYTES NFR BLD AUTO: 5.9 % (ref 3–8)
NEUTROPHILS # BLD AUTO: 6.77 10*3/MM3 (ref 1.5–8.3)
NEUTROPHILS NFR BLD AUTO: 72.7 % (ref 45–70)
NRBC BLD AUTO-RTO: 0 /100 WBC (ref 0–0)
PLATELET # BLD AUTO: 235 10*3/MM3 (ref 140–500)
POTASSIUM SERPL-SCNC: 4.6 MMOL/L (ref 3.5–5.2)
PROT SERPL-MCNC: 6.8 G/DL (ref 6–8.5)
RBC # BLD AUTO: 4.58 10*6/MM3 (ref 4.2–5.4)
SODIUM SERPL-SCNC: 140 MMOL/L (ref 136–145)
WBC # BLD AUTO: 9.32 10*3/MM3 (ref 4.8–10.8)

## 2018-07-24 PROCEDURE — 99215 OFFICE O/P EST HI 40 MIN: CPT | Performed by: INTERNAL MEDICINE

## 2018-07-24 NOTE — PROGRESS NOTES
Lydia John is a 73 y.o. female, who presents with a chief complaint of   Chief Complaint   Patient presents with   • Follow-up     Last had labs in April. Patient states she has lost seven pounds.    • Diabetes     Patient states that she is currently taking CBD Oil.        HPI   The pt is here for follow up.      She is doing much vik.  She is using her walker more.  She is here today with a walker and not in her wheelchair for the first time in months!  Today is her 3rd outing with her walker.  She has done lots of exercises in therapy and at home.  Her goal is to walk with a cane.  She is doing better with transfers and being independent.  She can now walk 20 min a day bid.  Her pain has been much better controlled.  She is using cbd oil and has been able to cut back on her narcotic pains.      Depression - sx improved.  Pt's independence has helped her mood significantly.      DM - family brought glucose log.  Am glucoses 110-150 for the most part.  No hypoglycemia. Pt on januvia and low dose metformin.      HLD - pt has not been on statin bc of myopathy.    The following portions of the patient's history were reviewed and updated as appropriate: allergies, current medications, past family history, past medical history, past social history, past surgical history and problem list.    Allergies: Patient has no known allergies.    Review of Systems   Constitutional: Negative.    HENT: Negative.    Eyes: Negative.    Respiratory: Negative.    Cardiovascular: Negative.    Gastrointestinal: Negative.    Endocrine: Negative.    Genitourinary: Negative.    Musculoskeletal: Negative.    Skin: Negative.    Allergic/Immunologic: Negative.    Neurological: Negative.    Hematological: Negative.    Psychiatric/Behavioral: Negative.    All other systems reviewed and are negative.            Wt Readings from Last 3 Encounters:   07/24/18 77.1 kg (170 lb)   03/27/18 80.3 kg (177 lb)   03/15/18 80.6 kg (177 lb 9.6 oz)      Temp Readings from Last 3 Encounters:   06/29/16 98.1 °F (36.7 °C)   05/31/16 98 °F (36.7 °C)     BP Readings from Last 3 Encounters:   07/24/18 122/80   04/24/18 126/84   03/15/18 120/82     Pulse Readings from Last 3 Encounters:   07/24/18 82   04/24/18 92   03/27/18 90     Body mass index is 33.2 kg/m².  @LASTSAO2(3)@    Physical Exam   Constitutional: She is oriented to person, place, and time. She appears well-developed and well-nourished. No distress.   Well dressed.  Outfit coordinated with red-polka dotted wedges.  Walking with wedges   HENT:   Head: Normocephalic and atraumatic.   Right Ear: External ear normal.   Left Ear: External ear normal.   Nose: Nose normal.   Mouth/Throat: Oropharynx is clear and moist.   Eyes: Pupils are equal, round, and reactive to light. Conjunctivae and EOM are normal.   Neck: Normal range of motion. Neck supple.   Cardiovascular: Normal rate, regular rhythm, normal heart sounds and intact distal pulses.    Pulmonary/Chest: Effort normal and breath sounds normal. No respiratory distress. She has no wheezes.   Musculoskeletal:   Pt can stand using walker   Neurological: She is alert and oriented to person, place, and time.   Skin: Skin is warm and dry.   Psychiatric: She has a normal mood and affect. Her behavior is normal. Judgment and thought content normal.   Nursing note and vitals reviewed.      Results for orders placed or performed in visit on 04/24/18   Comprehensive Metabolic Panel   Result Value Ref Range    Glucose 215 (H) 65 - 99 mg/dL    BUN 18 8 - 23 mg/dL    Creatinine 0.81 0.57 - 1.00 mg/dL    eGFR Non African Am 70 >60 mL/min/1.73    eGFR African Am 84 >60 mL/min/1.73    BUN/Creatinine Ratio 22.2 7.0 - 25.0    Sodium 139 136 - 145 mmol/L    Potassium 4.7 3.5 - 5.2 mmol/L    Chloride 97 (L) 98 - 107 mmol/L    Total CO2 27.6 22.0 - 29.0 mmol/L    Calcium 9.6 8.8 - 10.5 mg/dL    Total Protein 6.7 6.0 - 8.5 g/dL    Albumin 4.00 3.50 - 5.20 g/dL    Globulin 2.7  gm/dL    A/G Ratio 1.5 g/dL    Total Bilirubin 0.2 0.2 - 1.2 mg/dL    Alkaline Phosphatase 151 (H) 40 - 129 U/L    AST (SGOT) 21 5 - 32 U/L    ALT (SGPT) 25 5 - 33 U/L   Lipid Panel With LDL / HDL Ratio   Result Value Ref Range    Total Cholesterol 283 (H) 0 - 200 mg/dL    Triglycerides 385 (H) 0 - 150 mg/dL    HDL Cholesterol 51 40 - 60 mg/dL    VLDL Cholesterol 77 (H) 7 - 27 mg/dL    LDL Cholesterol  155 (H) 0 - 100 mg/dL    LDL/HDL Ratio 3.04    Hemoglobin A1c   Result Value Ref Range    Hemoglobin A1C 6.20 (H) 4.80 - 5.60 %   Vitamin B12   Result Value Ref Range    Vitamin B-12 >2000 (H) 232 - 1245 pg/mL   Vitamin D 25 Hydroxy   Result Value Ref Range    25 Hydroxy, Vitamin D 20.9 ng/ml   CBC & Differential   Result Value Ref Range    WBC 8.31 4.80 - 10.80 10*3/mm3    RBC 4.34 4.20 - 5.40 10*6/mm3    Hemoglobin 12.9 12.0 - 16.0 g/dL    Hematocrit 39.4 37.0 - 47.0 %    MCV 90.8 81.0 - 99.0 fL    MCH 29.7 27.0 - 31.0 pg    MCHC 32.7 31.0 - 37.0 g/dL    RDW 13.7 11.5 - 14.5 %    Platelets 266 140 - 500 10*3/mm3    Neutrophil Rel % 68.9 45.0 - 70.0 %    Lymphocyte Rel % 19.7 (L) 20.0 - 45.0 %    Monocyte Rel % 6.3 3.0 - 8.0 %    Eosinophil Rel % 4.3 (H) 0.0 - 4.0 %    Basophil Rel % 0.1 0.0 - 2.0 %    Neutrophils Absolute 5.72 1.50 - 8.30 10*3/mm3    Lymphocytes Absolute 1.64 0.60 - 4.80 10*3/mm3    Monocytes Absolute 0.52 0.00 - 1.00 10*3/mm3    Eosinophils Absolute 0.36 (H) 0.10 - 0.30 10*3/mm3    Basophils Absolute 0.01 0.00 - 0.20 10*3/mm3    Immature Granulocyte Rel % 0.7 (H) 0.0 - 0.5 %    Immature Grans Absolute 0.06 (H) 0.00 - 0.03 10*3/mm3    nRBC 0.0 0.0 - 0.0 /100 WBC           Lydia was seen today for follow-up and diabetes.    Diagnoses and all orders for this visit:    Type 2 diabetes mellitus with complication, without long-term current use of insulin (CMS/[facility])  -     Comprehensive Metabolic Panel; Future  -     CBC & Differential; Future  -     Hemoglobin A1c; Future    Mixed  hyperlipidemia    Proximal limb muscle weakness    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 (CMS/HCC)  -     Comprehensive Metabolic Panel; Future  -     CBC & Differential; Future  -     Hemoglobin A1c; Future    Colon cancer screening  -     Cologuard - Stool, Per Rectum; Future    Encounter for screening mammogram for breast cancer  -     Mammo Screening Bilateral With CAD; Future    Post-menopausal  -     DEXA Bone Density Axial; Future      40 min spent with pt with >50% spent in counseling about above issues.     Outpatient Medications Prior to Visit   Medication Sig Dispense Refill   • amitriptyline (ELAVIL) 75 MG tablet Take  by mouth Every Night.     • calcium citrate-vitamin d (CITRACAL) 200-250 MG-UNIT tablet tablet Take 1 tablet by mouth daily.     • docusate sodium (COLACE) 100 MG capsule Take 100 mg by mouth 3 (Three) Times a Day As Needed for constipation.     • DULoxetine (CYMBALTA) 60 MG capsule TAKE ONE CAPSULE BY MOUTH ONCE DAILY 90 capsule 3   • glucose blood test strip Use one strip to check glucose three times daily before meals 100 each 11   • ibuprofen (ADVIL,MOTRIN) 200 MG tablet Take 200 mg by mouth.     • metFORMIN (GLUCOPHAGE) 500 MG tablet Take 0.5 tablets by mouth 2 (Two) Times a Day With Meals. (Patient taking differently: Take 500 mg by mouth Daily.) 30 tablet 1   • omeprazole (priLOSEC) 20 MG capsule TAKE ONE CAPSULE BY MOUTH ONCE DAILY 90 capsule 1   • oxyCODONE-acetaminophen (PERCOCET) 7.5-325 MG per tablet Take 1 tablet by mouth Every 4 (Four) Hours As Needed for moderate pain (4-6). 90 tablet 0   • SITagliptin (JANUVIA) 100 MG tablet Take 1 tablet by mouth Daily. 90 tablet 1   • vitamin B-12 (CYANOCOBALAMIN) 100 MCG tablet Take 50 mcg by mouth daily.     • vitamin D (ERGOCALCIFEROL) 35633 UNITS capsule capsule      • zolpidem (AMBIEN) 5 MG tablet Take 1 tablet by mouth At Night As Needed for Sleep. 90 tablet 1     No facility-administered medications  prior to visit.      No orders of the defined types were placed in this encounter.    [unfilled]  There are no discontinued medications.      Return in about 3 months (around 10/24/2018) for Recheck, labs.

## 2018-08-01 ENCOUNTER — TELEPHONE (OUTPATIENT)
Dept: INTERNAL MEDICINE | Facility: CLINIC | Age: 73
End: 2018-08-01

## 2018-08-01 NOTE — TELEPHONE ENCOUNTER
----- Message from Selena Davis MD sent at 7/31/2018 12:47 PM EDT -----  Call pt about labs.  a1c 7.3.  Cont trying to increase activity.  No change in meds at this time.  Recheck 3 mo.    Pt was given info.dg

## 2018-08-24 ENCOUNTER — TELEPHONE (OUTPATIENT)
Dept: INTERNAL MEDICINE | Facility: CLINIC | Age: 73
End: 2018-08-24

## 2018-08-24 NOTE — TELEPHONE ENCOUNTER
PT  AWARE  IS GOING TO SCHEDULE NURSE VISIT      ----- Message from Francheska Trotter MD sent at 8/23/2018  4:03 PM EDT -----  Regarding: RE: SHINGLE SHOT  Can have Shingrix, but does not need Zostavax again   ----- Message -----  From: Lizzy Muir MA  Sent: 8/23/2018   3:21 PM  To: Francheska Trotter MD  Subject: FW: SHINGLE SHOT                                     ----- Message -----  From: Modesta Banks  Sent: 8/23/2018   3:10 PM  To: [facility]  Subject: SHINGLE SHOT                                     raegan    PT HAD SHINGLES SHOT IN 2011.   RECENTLY DIAGNOSED.  DOES SHE REQUIRE AN ADDITIONAL SHOT.    [phone]      CAN SHE GET A CALL BACK TODAY ?

## 2018-09-25 ENCOUNTER — TELEPHONE (OUTPATIENT)
Dept: INTERNAL MEDICINE | Facility: CLINIC | Age: 73
End: 2018-09-25

## 2018-09-25 NOTE — TELEPHONE ENCOUNTER
Spoke with patients daughter, told to stop taking Januvia and Metformin, informed of message, left samples up front for her to .   ----- Message from Selena Madden MD sent at 9/24/2018 11:44 AM EDT -----  Try janumet samples or can send in rx.  It has different metformin formulation that is often better tolerated.  janumet 50/1000   1 pill bid.     ----- Message -----  From: Gillian Betts MA  Sent: 9/24/2018  11:20 AM  To: Selena Madden MD    Thoughts??  ----- Message -----  From: Francheska Blanchard  Sent: 9/24/2018  11:04 AM  To: [facility] Clinical Pool    CALLER IS PT DAUGHTER. STATES SHE IS TAKING METFORMIN AND JANUVIA. STATES SHE TEXT DR MADDEN A FEW WEEKS AGO ABOUT HER BLOOD SUGAR NUMBERS BEING HIGH. (160) STATES SHE WAS TOLD TO DOUBLE THE METFORMIN BUT PT DID NOT TOLERATE THAT AT ALL. WANTS TO KNOW WHAT THE NEXT STEP. CALL BACK -[phone].

## 2018-10-08 ENCOUNTER — TELEPHONE (OUTPATIENT)
Dept: INTERNAL MEDICINE | Facility: CLINIC | Age: 73
End: 2018-10-08

## 2018-10-08 RX ORDER — METFORMIN HYDROCHLORIDE 500 MG/1
500 TABLET, EXTENDED RELEASE ORAL 2 TIMES DAILY
Qty: 60 TABLET | Refills: 2 | Status: SHIPPED | OUTPATIENT
Start: 2018-10-08 | End: 2018-10-30 | Stop reason: SDUPTHER

## 2018-10-08 NOTE — TELEPHONE ENCOUNTER
Rx sent in, and LM on daughter's VM.    ----- Message from Selena Carlin MD sent at 10/8/2018 11:05 AM EDT -----  Metformin er 500mg po bid    ----- Message -----  From: Maria Esther Saavedra MA  Sent: 10/5/2018   1:33 PM  To: Selena Carlin MD    Please confirm dose:  1/2 of 500 mg bid listed on med list  ----- Message -----  From: Selena Carlin MD  Sent: 10/5/2018   1:21 PM  To: Maria Esther Saavedra MA    Ok to try metformin    ----- Message -----  From: Maria Esther Saavedra MA  Sent: 10/5/2018   1:15 PM  To: Selena Carlin MD        ----- Message -----  From: Francheska Blanchard  Sent: 10/5/2018  12:32 PM  To: [facility] Clinical Pool    Pt daughter is caller. Pt sees dr carlin.   She has a question about her meds. raegan had wanted to start her on janumet but pt wants to give the BID metformin another chance.   We need to call in a script for metformin. Pt uses [facility] pharmacy in [facility] (on file). Call back is [phone].

## 2018-10-10 ENCOUNTER — TELEPHONE (OUTPATIENT)
Dept: INTERNAL MEDICINE | Facility: CLINIC | Age: 73
End: 2018-10-10

## 2018-10-10 NOTE — TELEPHONE ENCOUNTER
Spoke with daughter.  Advised per Dr. Davis's note from 10/8/2018, Dr. Davis did clarify that patient is to be on the  twice daily.  I advised daughter to check BS daily to make sure patient staying within normal limits on new dosing.  Daughter voiced understanding.    ----- Message from Maria Esther Saavedra MA sent at 10/10/2018  3:24 PM EDT -----      ----- Message -----  From: Francheska Blanchard  Sent: 10/10/2018   3:00 PM  To: Don [facility] Clinical [place]    DAUGHTER THOUGHT THAT THE METFORMIN WAS SUPPOSED TO BE REGULAR METFORMIN NOT EXTENDED RELEASE. THOUGHT THIS SHOULD ONLY BE TAKEN ONCE A DAY AND SHE PICKED UP A SCRIPT FOR TWICE A DAY.   SHE ALSO THINKS SHE WAS TAKING HER LAST METFORMIN WRONG. THE BOTTLE SAYS 1/2 TAB TWICE DAILY AND SHE HAS BEEN TAKING THIS ONE TABLET ONCE DAILY.  CALL BACK [phone]-JAY JAY ALLEN IS CALLER/DAUGHTER. SEES [facility].

## 2018-10-10 NOTE — TELEPHONE ENCOUNTER
----- Message from Maria Esther Saavedra MA sent at 10/10/2018  3:24 PM EDT -----      ----- Message -----  From: Francheska Blanchard  Sent: 10/10/2018   3:00 PM  To: [facility] [place]    DAUGHTER THOUGHT THAT THE METFORMIN WAS SUPPOSED TO BE REGULAR METFORMIN NOT EXTENDED RELEASE. THOUGHT THIS SHOULD ONLY BE TAKEN ONCE A DAY AND SHE PICKED UP A SCRIPT FOR TWICE A DAY.   SHE ALSO THINKS SHE WAS TAKING HER LAST METFORMIN WRONG. THE BOTTLE SAYS 1/2 TAB TWICE DAILY AND SHE HAS BEEN TAKING THIS ONE TABLET ONCE DAILY.  CALL BACK [phone]-JAY JAY ALLEN IS CALLER/DAUGHTER. SEES [facility].

## 2018-10-30 ENCOUNTER — OFFICE VISIT (OUTPATIENT)
Dept: INTERNAL MEDICINE | Facility: CLINIC | Age: 73
End: 2018-10-30

## 2018-10-30 VITALS
DIASTOLIC BLOOD PRESSURE: 80 MMHG | OXYGEN SATURATION: 97 % | WEIGHT: 177 LBS | SYSTOLIC BLOOD PRESSURE: 124 MMHG | HEIGHT: 60 IN | HEART RATE: 92 BPM | BODY MASS INDEX: 34.75 KG/M2

## 2018-10-30 DIAGNOSIS — K29.00 ACUTE GASTRITIS WITHOUT HEMORRHAGE, UNSPECIFIED GASTRITIS TYPE: ICD-10-CM

## 2018-10-30 DIAGNOSIS — E11.8 TYPE 2 DIABETES MELLITUS WITH COMPLICATION, WITHOUT LONG-TERM CURRENT USE OF INSULIN (HCC): ICD-10-CM

## 2018-10-30 DIAGNOSIS — E55.9 VITAMIN D DEFICIENCY: ICD-10-CM

## 2018-10-30 DIAGNOSIS — G47.9 SLEEP DIFFICULTIES: ICD-10-CM

## 2018-10-30 DIAGNOSIS — E13.44 DIABETIC AMYOTROPHY ASSOCIATED WITH OTHER SPECIFIED DIABETES MELLITUS (HCC): ICD-10-CM

## 2018-10-30 DIAGNOSIS — Z23 NEED FOR VACCINATION: Primary | ICD-10-CM

## 2018-10-30 DIAGNOSIS — E78.2 MIXED HYPERLIPIDEMIA: ICD-10-CM

## 2018-10-30 PROCEDURE — 90670 PCV13 VACCINE IM: CPT | Performed by: INTERNAL MEDICINE

## 2018-10-30 PROCEDURE — 90662 IIV NO PRSV INCREASED AG IM: CPT | Performed by: INTERNAL MEDICINE

## 2018-10-30 PROCEDURE — 99214 OFFICE O/P EST MOD 30 MIN: CPT | Performed by: INTERNAL MEDICINE

## 2018-10-30 PROCEDURE — G0009 ADMIN PNEUMOCOCCAL VACCINE: HCPCS | Performed by: INTERNAL MEDICINE

## 2018-10-30 PROCEDURE — G0008 ADMIN INFLUENZA VIRUS VAC: HCPCS | Performed by: INTERNAL MEDICINE

## 2018-10-30 RX ORDER — ZOLPIDEM TARTRATE 5 MG/1
5 TABLET ORAL NIGHTLY PRN
Qty: 90 TABLET | Refills: 1 | Status: SHIPPED | OUTPATIENT
Start: 2018-10-30 | End: 2019-04-04 | Stop reason: SDUPTHER

## 2018-10-30 RX ORDER — AMITRIPTYLINE HYDROCHLORIDE 75 MG/1
75 TABLET, FILM COATED ORAL NIGHTLY
Qty: 90 TABLET | Refills: 3 | Status: SHIPPED | OUTPATIENT
Start: 2018-10-30 | End: 2019-12-26

## 2018-10-30 RX ORDER — METFORMIN HYDROCHLORIDE 500 MG/1
500 TABLET, EXTENDED RELEASE ORAL 2 TIMES DAILY
Qty: 180 TABLET | Refills: 3 | Status: SHIPPED | OUTPATIENT
Start: 2018-10-30 | End: 2019-01-29

## 2018-10-30 RX ORDER — OMEPRAZOLE 20 MG/1
20 CAPSULE, DELAYED RELEASE ORAL DAILY
Qty: 90 CAPSULE | Refills: 3 | Status: SHIPPED | OUTPATIENT
Start: 2018-10-30 | End: 2019-11-17 | Stop reason: SDUPTHER

## 2018-10-30 RX ORDER — ERGOCALCIFEROL 1.25 MG/1
50000 CAPSULE ORAL
Qty: 13 CAPSULE | Refills: 4 | Status: SHIPPED | OUTPATIENT
Start: 2018-10-30 | End: 2020-02-03 | Stop reason: SDUPTHER

## 2018-10-30 NOTE — PROGRESS NOTES
Lydia John is a 73 y.o. female, who presents with a chief complaint of   Chief Complaint   Patient presents with   • Follow-up   • Diabetes   • Med Refill       HPI   Pt here with her daughter for follow up    DM - glucoses have been somewhat higher.  She brought in glucose log.  Fasting glucoses mostly 130-170's.  She is on metformin xr and januvia.  We just restarted her metformin in the past 2 weeks.  She tolerated the extended release metformin much better than the short release.      Gerd -     Insomnia    The following portions of the patient's history were reviewed and updated as appropriate: allergies, current medications, past family history, past medical history, past social history, past surgical history and problem list.    Allergies: Patient has no known allergies.    Review of Systems   Constitutional: Negative.    HENT: Negative.    Eyes: Negative.    Respiratory: Negative.    Cardiovascular: Negative.    Gastrointestinal: Negative.    Endocrine: Negative.    Genitourinary: Negative.    Musculoskeletal: Negative.    Skin: Negative.    Allergic/Immunologic: Negative.    Neurological: Negative.    Hematological: Negative.    Psychiatric/Behavioral: Negative.    All other systems reviewed and are negative.            Wt Readings from Last 3 Encounters:   10/30/18 80.3 kg (177 lb)   07/24/18 77.1 kg (170 lb)   03/27/18 80.3 kg (177 lb)     Temp Readings from Last 3 Encounters:   06/29/16 98.1 °F (36.7 °C)   05/31/16 98 °F (36.7 °C)     BP Readings from Last 3 Encounters:   10/30/18 124/80   07/24/18 122/80   04/24/18 126/84     Pulse Readings from Last 3 Encounters:   10/30/18 92   07/24/18 82   04/24/18 92     Body mass index is 34.57 kg/m².  @LASTSAO2(3)@    Physical Exam   Constitutional: She is oriented to person, place, and time. She appears well-developed and well-nourished. No distress.   HENT:   Head: Normocephalic and atraumatic.   Right Ear: External ear normal.   Left Ear: External  ear normal.   Nose: Nose normal.   Mouth/Throat: Oropharynx is clear and moist.   Eyes: Pupils are equal, round, and reactive to light. Conjunctivae and EOM are normal.   Neck: Normal range of motion. Neck supple.   Cardiovascular: Normal rate, regular rhythm, normal heart sounds and intact distal pulses.    Pulmonary/Chest: Effort normal and breath sounds normal. No respiratory distress. She has no wheezes.   Musculoskeletal:   Pt walking with walker   Neurological: She is alert and oriented to person, place, and time.   Skin: Skin is warm and dry.   Psychiatric: She has a normal mood and affect. Her behavior is normal. Judgment and thought content normal.   Nursing note and vitals reviewed.      Results for orders placed or performed in visit on 07/24/18   Comprehensive Metabolic Panel   Result Value Ref Range    Glucose 203 (H) 65 - 99 mg/dL    BUN 13 8 - 23 mg/dL    Creatinine 0.61 0.57 - 1.00 mg/dL    eGFR Non African Am 96 >60 mL/min/1.73    eGFR African Am 117 >60 mL/min/1.73    BUN/Creatinine Ratio 21.3 7.0 - 25.0    Sodium 140 136 - 145 mmol/L    Potassium 4.6 3.5 - 5.2 mmol/L    Chloride 97 (L) 98 - 107 mmol/L    Total CO2 29.5 (H) 22.0 - 29.0 mmol/L    Calcium 9.7 8.8 - 10.5 mg/dL    Total Protein 6.8 6.0 - 8.5 g/dL    Albumin 4.30 3.50 - 5.20 g/dL    Globulin 2.5 gm/dL    A/G Ratio 1.7 g/dL    Total Bilirubin 0.3 0.2 - 1.2 mg/dL    Alkaline Phosphatase 153 (H) 40 - 129 U/L    AST (SGOT) 20 5 - 32 U/L    ALT (SGPT) 27 5 - 33 U/L   Hemoglobin A1c   Result Value Ref Range    Hemoglobin A1C 7.30 (H) 4.80 - 5.60 %   CBC & Differential   Result Value Ref Range    WBC 9.32 4.80 - 10.80 10*3/mm3    RBC 4.58 4.20 - 5.40 10*6/mm3    Hemoglobin 13.8 12.0 - 16.0 g/dL    Hematocrit 41.5 37.0 - 47.0 %    MCV 90.6 81.0 - 99.0 fL    MCH 30.1 27.0 - 31.0 pg    MCHC 33.3 31.0 - 37.0 g/dL    RDW 13.2 11.5 - 14.5 %    Platelets 235 140 - 500 10*3/mm3    Neutrophil Rel % 72.7 (H) 45.0 - 70.0 %    Lymphocyte Rel % 17.7 (L)  20.0 - 45.0 %    Monocyte Rel % 5.9 3.0 - 8.0 %    Eosinophil Rel % 3.0 0.0 - 4.0 %    Basophil Rel % 0.2 0.0 - 2.0 %    Neutrophils Absolute 6.77 1.50 - 8.30 10*3/mm3    Lymphocytes Absolute 1.65 0.60 - 4.80 10*3/mm3    Monocytes Absolute 0.55 0.00 - 1.00 10*3/mm3    Eosinophils Absolute 0.28 0.10 - 0.30 10*3/mm3    Basophils Absolute 0.02 0.00 - 0.20 10*3/mm3    Immature Granulocyte Rel % 0.5 0.0 - 0.5 %    Immature Grans Absolute 0.05 (H) 0.00 - 0.03 10*3/mm3    nRBC 0.0 0.0 - 0.0 /100 WBC           Lydia was seen today for follow-up, diabetes and med refill.    Diagnoses and all orders for this visit:    Need for vaccination  -     Flu Vaccine High Dose PF 65YR+ ([id number])  -     Pneumococcal Conjugate Vaccine 13-Valent All    Sleep difficulties  -     zolpidem (AMBIEN) 5 MG tablet; Take 1 tablet by mouth At Night As Needed for Sleep.    Acute gastritis without hemorrhage, unspecified gastritis type  -     omeprazole (priLOSEC) 20 MG capsule; Take 1 capsule by mouth Daily.    Vitamin D deficiency  -     vitamin D (ERGOCALCIFEROL) 96704 units capsule capsule; Take 1 capsule by mouth Every 7 (Seven) Days.    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    Type 2 diabetes mellitus with complication, without long-term current use of insulin (CMS/[facility])  -     metFORMIN ER (GLUCOPHAGE-XR) 500 MG 24 hr tablet; Take 1 tablet by mouth 2 (Two) Times a Day.    Mixed hyperlipidemia          Outpatient Medications Prior to Visit   Medication Sig Dispense Refill   • calcium citrate-vitamin d (CITRACAL) 200-250 MG-UNIT tablet tablet Take 1 tablet by mouth daily.     • docusate sodium (COLACE) 100 MG capsule Take 100 mg by mouth 3 (Three) Times a Day As Needed for constipation.     • DULoxetine (CYMBALTA) 60 MG capsule TAKE ONE CAPSULE BY MOUTH ONCE DAILY 90 capsule 3   • glucose blood test strip Use one strip to  check glucose three times daily before meals 100 each 11   • ibuprofen (ADVIL,MOTRIN) 200 MG tablet Take 200 mg by mouth.     • oxyCODONE-acetaminophen (PERCOCET) 7.5-325 MG per tablet Take 1 tablet by mouth Every 4 (Four) Hours As Needed for moderate pain (4-6). 90 tablet 0   • SITagliptin (JANUVIA) 100 MG tablet Take 1 tablet by mouth Daily. 90 tablet 1   • vitamin B-12 (CYANOCOBALAMIN) 100 MCG tablet Take 50 mcg by mouth daily.     • amitriptyline (ELAVIL) 75 MG tablet Take  by mouth Every Night.     • metFORMIN ER (GLUCOPHAGE-XR) 500 MG 24 hr tablet Take 1 tablet by mouth 2 (Two) Times a Day. 60 tablet 2   • omeprazole (priLOSEC) 20 MG capsule TAKE ONE CAPSULE BY MOUTH ONCE DAILY 90 capsule 1   • zolpidem (AMBIEN) 5 MG tablet Take 1 tablet by mouth At Night As Needed for Sleep. 90 tablet 1   • vitamin D (ERGOCALCIFEROL) 67765 UNITS capsule capsule 50,000 Units Every 7 (Seven) Days.       No facility-administered medications prior to visit.      New Medications Ordered This Visit   Medications   • vitamin D (ERGOCALCIFEROL) 75427 units capsule capsule     Sig: Take 1 capsule by mouth Every 7 (Seven) Days.     Dispense:  13 capsule     Refill:  4   • zolpidem (AMBIEN) 5 MG tablet     Sig: Take 1 tablet by mouth At Night As Needed for Sleep.     Dispense:  90 tablet     Refill:  1   • omeprazole (priLOSEC) 20 MG capsule     Sig: Take 1 capsule by mouth Daily.     Dispense:  90 capsule     Refill:  3   • amitriptyline (ELAVIL) 75 MG tablet     Sig: Take 1 tablet by mouth Every Night.     Dispense:  90 tablet     Refill:  3   • metFORMIN ER (GLUCOPHAGE-XR) 500 MG 24 hr tablet     Sig: Take 1 tablet by mouth 2 (Two) Times a Day.     Dispense:  180 tablet     Refill:  3     [unfilled]  Medications Discontinued During This Encounter   Medication Reason   • vitamin D (ERGOCALCIFEROL) 03853 UNITS capsule capsule Reorder   • zolpidem (AMBIEN) 5 MG tablet Reorder   • omeprazole (priLOSEC) 20 MG capsule Reorder   •  amitriptyline (ELAVIL) 75 MG tablet Reorder   • metFORMIN ER (GLUCOPHAGE-XR) 500 MG 24 hr tablet Reorder         Return in about 3 months (around 1/30/2019) for Recheck, labs.

## 2018-10-31 LAB
ALBUMIN SERPL-MCNC: 4.3 G/DL (ref 3.5–4.8)
ALBUMIN/GLOB SERPL: 1.8 {RATIO} (ref 1.2–2.2)
ALP SERPL-CCNC: 132 IU/L (ref 39–117)
ALT SERPL-CCNC: 29 IU/L (ref 0–32)
AST SERPL-CCNC: 22 IU/L (ref 0–40)
BASOPHILS # BLD AUTO: 0 X10E3/UL (ref 0–0.2)
BASOPHILS NFR BLD AUTO: 0 %
BILIRUB SERPL-MCNC: 0.2 MG/DL (ref 0–1.2)
BUN SERPL-MCNC: 15 MG/DL (ref 8–27)
BUN/CREAT SERPL: 21 (ref 12–28)
CALCIUM SERPL-MCNC: 9.4 MG/DL (ref 8.7–10.3)
CHLORIDE SERPL-SCNC: 95 MMOL/L (ref 96–106)
CHOLEST SERPL-MCNC: 260 MG/DL (ref 100–199)
CO2 SERPL-SCNC: 22 MMOL/L (ref 20–29)
CREAT SERPL-MCNC: 0.71 MG/DL (ref 0.57–1)
EOSINOPHIL # BLD AUTO: 0.3 X10E3/UL (ref 0–0.4)
EOSINOPHIL NFR BLD AUTO: 3 %
ERYTHROCYTE [DISTWIDTH] IN BLOOD BY AUTOMATED COUNT: 13.6 % (ref 12.3–15.4)
GLOBULIN SER CALC-MCNC: 2.4 G/DL (ref 1.5–4.5)
GLUCOSE SERPL-MCNC: 226 MG/DL (ref 65–99)
HBA1C MFR BLD: 8.5 % (ref 4.8–5.6)
HCT VFR BLD AUTO: 39.6 % (ref 34–46.6)
HDLC SERPL-MCNC: 44 MG/DL
HGB BLD-MCNC: 13.7 G/DL (ref 11.1–15.9)
IMM GRANULOCYTES # BLD: 0 X10E3/UL (ref 0–0.1)
IMM GRANULOCYTES NFR BLD: 0 %
LDLC SERPL CALC-MCNC: 144 MG/DL (ref 0–99)
LDLC/HDLC SERPL: 3.3 RATIO (ref 0–3.2)
LYMPHOCYTES # BLD AUTO: 1.9 X10E3/UL (ref 0.7–3.1)
LYMPHOCYTES NFR BLD AUTO: 20 %
MCH RBC QN AUTO: 29.8 PG (ref 26.6–33)
MCHC RBC AUTO-ENTMCNC: 34.6 G/DL (ref 31.5–35.7)
MCV RBC AUTO: 86 FL (ref 79–97)
MONOCYTES # BLD AUTO: 0.6 X10E3/UL (ref 0.1–0.9)
MONOCYTES NFR BLD AUTO: 6 %
NEUTROPHILS # BLD AUTO: 6.7 X10E3/UL (ref 1.4–7)
NEUTROPHILS NFR BLD AUTO: 71 %
PLATELET # BLD AUTO: 256 X10E3/UL (ref 150–379)
POTASSIUM SERPL-SCNC: 4.8 MMOL/L (ref 3.5–5.2)
PROT SERPL-MCNC: 6.7 G/DL (ref 6–8.5)
RBC # BLD AUTO: 4.59 X10E6/UL (ref 3.77–5.28)
SODIUM SERPL-SCNC: 134 MMOL/L (ref 134–144)
T4 FREE SERPL-MCNC: 1.11 NG/DL (ref 0.82–1.77)
TRIGL SERPL-MCNC: 361 MG/DL (ref 0–149)
TSH SERPL DL<=0.005 MIU/L-ACNC: 3.09 UIU/ML (ref 0.45–4.5)
VLDLC SERPL CALC-MCNC: 72 MG/DL (ref 5–40)
WBC # BLD AUTO: 9.5 X10E3/UL (ref 3.4–10.8)

## 2018-12-27 RX ORDER — SITAGLIPTIN 100 MG/1
TABLET, FILM COATED ORAL
Qty: 90 TABLET | Refills: 1 | Status: SHIPPED | OUTPATIENT
Start: 2018-12-27 | End: 2020-01-20

## 2019-01-29 ENCOUNTER — OFFICE VISIT (OUTPATIENT)
Dept: INTERNAL MEDICINE | Facility: CLINIC | Age: 74
End: 2019-01-29

## 2019-01-29 VITALS
BODY MASS INDEX: 35.34 KG/M2 | TEMPERATURE: 97.7 F | HEIGHT: 60 IN | HEART RATE: 90 BPM | WEIGHT: 180 LBS | OXYGEN SATURATION: 95 % | DIASTOLIC BLOOD PRESSURE: 76 MMHG | RESPIRATION RATE: 16 BRPM | SYSTOLIC BLOOD PRESSURE: 134 MMHG

## 2019-01-29 DIAGNOSIS — G82.20 PARAPARESIS (HCC): ICD-10-CM

## 2019-01-29 DIAGNOSIS — E78.2 MIXED HYPERLIPIDEMIA: ICD-10-CM

## 2019-01-29 DIAGNOSIS — E11.8 TYPE 2 DIABETES MELLITUS WITH COMPLICATION, WITHOUT LONG-TERM CURRENT USE OF INSULIN (HCC): ICD-10-CM

## 2019-01-29 DIAGNOSIS — E13.44 DIABETIC AMYOTROPHY ASSOCIATED WITH OTHER SPECIFIED DIABETES MELLITUS (HCC): Primary | ICD-10-CM

## 2019-01-29 DIAGNOSIS — E55.9 VITAMIN D DEFICIENCY: ICD-10-CM

## 2019-01-29 DIAGNOSIS — G62.9 PERIPHERAL POLYNEUROPATHY: ICD-10-CM

## 2019-01-29 LAB
ALBUMIN SERPL-MCNC: 4.2 G/DL (ref 3.5–5.2)
ALBUMIN/GLOB SERPL: 1.7 G/DL
ALP SERPL-CCNC: 112 U/L (ref 40–129)
ALT SERPL-CCNC: 19 U/L (ref 5–33)
AST SERPL-CCNC: 18 U/L (ref 5–32)
BASOPHILS # BLD AUTO: 0.02 10*3/MM3 (ref 0–0.2)
BASOPHILS NFR BLD AUTO: 0.2 % (ref 0–2)
BILIRUB SERPL-MCNC: 0.2 MG/DL (ref 0.2–1.2)
BUN SERPL-MCNC: 15 MG/DL (ref 8–23)
BUN/CREAT SERPL: 19.2 (ref 7–25)
CALCIUM SERPL-MCNC: 9.4 MG/DL (ref 8.8–10.5)
CHLORIDE SERPL-SCNC: 96 MMOL/L (ref 98–107)
CHOLEST SERPL-MCNC: 274 MG/DL (ref 0–200)
CO2 SERPL-SCNC: 25.8 MMOL/L (ref 22–29)
CREAT SERPL-MCNC: 0.78 MG/DL (ref 0.57–1)
EOSINOPHIL # BLD AUTO: 0.32 10*3/MM3 (ref 0.1–0.3)
EOSINOPHIL NFR BLD AUTO: 3.6 % (ref 0–4)
ERYTHROCYTE [DISTWIDTH] IN BLOOD BY AUTOMATED COUNT: 13.2 % (ref 11.5–14.5)
GLOBULIN SER CALC-MCNC: 2.5 GM/DL
GLUCOSE SERPL-MCNC: 189 MG/DL (ref 65–99)
HBA1C MFR BLD: 6.9 % (ref 4.8–5.6)
HCT VFR BLD AUTO: 42 % (ref 37–47)
HDLC SERPL-MCNC: 49 MG/DL (ref 40–60)
HGB BLD-MCNC: 13.6 G/DL (ref 12–16)
IMM GRANULOCYTES # BLD AUTO: 0.05 10*3/MM3 (ref 0–0.03)
IMM GRANULOCYTES NFR BLD AUTO: 0.6 % (ref 0–0.5)
LDLC SERPL CALC-MCNC: 148 MG/DL (ref 0–100)
LDLC/HDLC SERPL: 3.02 {RATIO}
LYMPHOCYTES # BLD AUTO: 1.73 10*3/MM3 (ref 0.6–4.8)
LYMPHOCYTES NFR BLD AUTO: 19.7 % (ref 20–45)
MCH RBC QN AUTO: 29.8 PG (ref 27–31)
MCHC RBC AUTO-ENTMCNC: 32.4 G/DL (ref 31–37)
MCV RBC AUTO: 92.1 FL (ref 81–99)
MONOCYTES # BLD AUTO: 0.52 10*3/MM3 (ref 0–1)
MONOCYTES NFR BLD AUTO: 5.9 % (ref 3–8)
NEUTROPHILS # BLD AUTO: 6.15 10*3/MM3 (ref 1.5–8.3)
NEUTROPHILS NFR BLD AUTO: 70 % (ref 45–70)
NRBC BLD AUTO-RTO: 0 /100 WBC (ref 0–0)
PLATELET # BLD AUTO: 237 10*3/MM3 (ref 140–500)
POTASSIUM SERPL-SCNC: 4.5 MMOL/L (ref 3.5–5.2)
PROT SERPL-MCNC: 6.7 G/DL (ref 6–8.5)
RBC # BLD AUTO: 4.56 10*6/MM3 (ref 4.2–5.4)
SODIUM SERPL-SCNC: 138 MMOL/L (ref 136–145)
TRIGL SERPL-MCNC: 385 MG/DL (ref 0–150)
VLDLC SERPL CALC-MCNC: 77 MG/DL (ref 7–27)
WBC # BLD AUTO: 8.79 10*3/MM3 (ref 4.8–10.8)

## 2019-01-29 PROCEDURE — 99214 OFFICE O/P EST MOD 30 MIN: CPT | Performed by: INTERNAL MEDICINE

## 2019-01-29 RX ORDER — METFORMIN HYDROCHLORIDE 500 MG/1
500 TABLET, EXTENDED RELEASE ORAL
Qty: 90 TABLET | Refills: 3
Start: 2019-01-29 | End: 2019-07-30

## 2019-01-29 NOTE — PROGRESS NOTES
Lydia John is a 73 y.o. female, who presents with a chief complaint of   Chief Complaint   Patient presents with   • Follow-up     3 month f/u    • Hyperlipidemia       HPI   The pt is here for follow up.  She is slowly getting stronger.  She is doing better with transfers.  Today she is walking with her walker.  She feels like she is doing much better with pain control.  She takes cbd oil and opioids as needed.  She usually takes he pain pill in the afternoon and at night.      HTN - well controlled.  No ha dizziness.     DM - glucoses overall better.  She has her glucose log and almost all glucoses are under 130 now and occ in the 90's.  She is on metformin ER daily but has gi side effects with higher dose.  She has had some urge incontinence especially with stool.      HLD - trying to eat healthy diet.     The following portions of the patient's history were reviewed and updated as appropriate: allergies, current medications, past family history, past medical history, past social history, past surgical history and problem list.    Allergies: Patient has no known allergies.    Review of Systems   Constitutional: Negative.    HENT: Negative.    Eyes: Negative.    Respiratory: Negative.    Cardiovascular: Negative.    Gastrointestinal: Positive for diarrhea.   Endocrine: Negative.    Genitourinary: Negative.    Musculoskeletal: Negative.    Skin: Negative.    Allergic/Immunologic: Negative.    Neurological: Negative.    Hematological: Negative.    Psychiatric/Behavioral: Negative.    All other systems reviewed and are negative.            Wt Readings from Last 3 Encounters:   01/29/19 81.6 kg (180 lb)   10/30/18 80.3 kg (177 lb)   07/24/18 77.1 kg (170 lb)     Temp Readings from Last 3 Encounters:   01/29/19 97.7 °F (36.5 °C) (Oral)   06/29/16 98.1 °F (36.7 °C)   05/31/16 98 °F (36.7 °C)     BP Readings from Last 3 Encounters:   01/29/19 134/76   10/30/18 124/80   07/24/18 122/80     Pulse Readings from  Last 3 Encounters:   01/29/19 90   10/30/18 92   07/24/18 82     Body mass index is 35.15 kg/m².  @LASTSAO2(3)@    Physical Exam   Constitutional: She is oriented to person, place, and time. She appears well-developed and well-nourished. No distress.   HENT:   Head: Normocephalic and atraumatic.   Right Ear: External ear normal.   Left Ear: External ear normal.   Nose: Nose normal.   Mouth/Throat: Oropharynx is clear and moist.   Eyes: Conjunctivae and EOM are normal. Pupils are equal, round, and reactive to light.   Neck: Normal range of motion. Neck supple.   Cardiovascular: Normal rate, regular rhythm, normal heart sounds and intact distal pulses.   Pulmonary/Chest: Effort normal and breath sounds normal. No respiratory distress. She has no wheezes.   Musculoskeletal: She exhibits no edema.   walks with cane   Neurological: She is alert and oriented to person, place, and time.   Skin: Skin is warm and dry.   Psychiatric: She has a normal mood and affect. Her behavior is normal. Judgment and thought content normal.   Nursing note and vitals reviewed.      Results for orders placed or performed in visit on 10/30/18   Comprehensive Metabolic Panel   Result Value Ref Range    Glucose 226 (H) 65 - 99 mg/dL    BUN 15 8 - 27 mg/dL    Creatinine 0.71 0.57 - 1.00 mg/dL    eGFR Non African Am 85 >59 mL/min/1.73    eGFR African Am 98 >59 mL/min/1.73    BUN/Creatinine Ratio 21 12 - 28    Sodium 134 134 - 144 mmol/L    Potassium 4.8 3.5 - 5.2 mmol/L    Chloride 95 (L) 96 - 106 mmol/L    Total CO2 22 20 - 29 mmol/L    Calcium 9.4 8.7 - 10.3 mg/dL    Total Protein 6.7 6.0 - 8.5 g/dL    Albumin 4.3 3.5 - 4.8 g/dL    Globulin 2.4 1.5 - 4.5 g/dL    A/G Ratio 1.8 1.2 - 2.2    Total Bilirubin 0.2 0.0 - 1.2 mg/dL    Alkaline Phosphatase 132 (H) 39 - 117 IU/L    AST (SGOT) 22 0 - 40 IU/L    ALT (SGPT) 29 0 - 32 IU/L   Lipid Panel With LDL / HDL Ratio   Result Value Ref Range    Total Cholesterol 260 (H) 100 - 199 mg/dL     Triglycerides 361 (H) 0 - 149 mg/dL    HDL Cholesterol 44 >39 mg/dL    VLDL Cholesterol 72 (H) 5 - 40 mg/dL    LDL Cholesterol  144 (H) 0 - 99 mg/dL    LDL/HDL Ratio 3.3 (H) 0.0 - 3.2 ratio   Hemoglobin A1c   Result Value Ref Range    Hemoglobin A1C 8.5 (H) 4.8 - 5.6 %   T4, Free   Result Value Ref Range    Free T4 1.11 0.82 - 1.77 ng/dL   TSH   Result Value Ref Range    TSH 3.090 0.450 - 4.500 uIU/mL   CBC & Differential   Result Value Ref Range    WBC 9.5 3.4 - 10.8 x10E3/uL    RBC 4.59 3.77 - 5.28 x10E6/uL    Hemoglobin 13.7 11.1 - 15.9 g/dL    Hematocrit 39.6 34.0 - 46.6 %    MCV 86 79 - 97 fL    MCH 29.8 26.6 - 33.0 pg    MCHC 34.6 31.5 - 35.7 g/dL    RDW 13.6 12.3 - 15.4 %    Platelets 256 150 - 379 x10E3/uL    Neutrophil Rel % 71 Not Estab. %    Lymphocyte Rel % 20 Not Estab. %    Monocyte Rel % 6 Not Estab. %    Eosinophil Rel % 3 Not Estab. %    Basophil Rel % 0 Not Estab. %    Neutrophils Absolute 6.7 1.4 - 7.0 x10E3/uL    Lymphocytes Absolute 1.9 0.7 - 3.1 x10E3/uL    Monocytes Absolute 0.6 0.1 - 0.9 x10E3/uL    Eosinophils Absolute 0.3 0.0 - 0.4 x10E3/uL    Basophils Absolute 0.0 0.0 - 0.2 x10E3/uL    Immature Granulocyte Rel % 0 Not Estab. %    Immature Grans Absolute 0.0 0.0 - 0.1 x10E3/uL           Lydia was seen today for follow-up and hyperlipidemia.    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 (CMS/[facility])  -     Ambulatory Referral to Home Health    Mixed hyperlipidemia  -     Comprehensive Metabolic Panel  -     Lipid Panel With LDL / HDL Ratio    Vitamin D deficiency    Type 2 diabetes mellitus with complication, without long-term current use of insulin (CMS/[facility])  -     Ambulatory Referral to Home Health  -     Comprehensive Metabolic Panel  -     CBC & Differential  -     Lipid Panel With LDL / HDL Ratio  -     Hemoglobin A1c  -     Microalbumin / Creatinine Urine Ratio - Urine, Clean Catch  -     metFORMIN ER (GLUCOPHAGE-XR)  500 MG 24 hr tablet; Take 1 tablet by mouth Daily With Breakfast.    Paraparesis (CMS/HCC)    Peripheral polyneuropathy    diarrhea - try cutting back on metformin, add fiber/probiotic, imodium prn if needed.      Cont current meds.  Ov/labs in 3 mo.     Outpatient Medications Prior to Visit   Medication Sig Dispense Refill   • amitriptyline (ELAVIL) 75 MG tablet Take 1 tablet by mouth Every Night. 90 tablet 3   • calcium citrate-vitamin d (CITRACAL) 200-250 MG-UNIT tablet tablet Take 1 tablet by mouth daily.     • docusate sodium (COLACE) 100 MG capsule Take 100 mg by mouth 3 (Three) Times a Day As Needed for constipation.     • DULoxetine (CYMBALTA) 60 MG capsule TAKE ONE CAPSULE BY MOUTH ONCE DAILY 90 capsule 3   • glucose blood test strip Use one strip to check glucose three times daily before meals 100 each 11   • ibuprofen (ADVIL,MOTRIN) 200 MG tablet Take 200 mg by mouth.     • JANUVIA 100 MG tablet TAKE 1 TABLET BY MOUTH ONCE DAILY 90 tablet 1   • omeprazole (priLOSEC) 20 MG capsule Take 1 capsule by mouth Daily. 90 capsule 3   • oxyCODONE-acetaminophen (PERCOCET) 7.5-325 MG per tablet Take 1 tablet by mouth Every 4 (Four) Hours As Needed for moderate pain (4-6). 90 tablet 0   • vitamin B-12 (CYANOCOBALAMIN) 100 MCG tablet Take 50 mcg by mouth daily.     • vitamin D (ERGOCALCIFEROL) 20424 units capsule capsule Take 1 capsule by mouth Every 7 (Seven) Days. 13 capsule 4   • zolpidem (AMBIEN) 5 MG tablet Take 1 tablet by mouth At Night As Needed for Sleep. 90 tablet 1   • metFORMIN ER (GLUCOPHAGE-XR) 500 MG 24 hr tablet Take 1 tablet by mouth 2 (Two) Times a Day. 180 tablet 3     No facility-administered medications prior to visit.      New Medications Ordered This Visit   Medications   • metFORMIN ER (GLUCOPHAGE-XR) 500 MG 24 hr tablet     Sig: Take 1 tablet by mouth Daily With Breakfast.     Dispense:  90 tablet     Refill:  3     [unfilled]  Medications Discontinued During This Encounter   Medication  Reason   • metFORMIN ER (GLUCOPHAGE-XR) 500 MG 24 hr tablet          Return in about 3 months (around 4/29/2019) for Recheck, labs.

## 2019-01-30 ENCOUNTER — TELEPHONE (OUTPATIENT)
Dept: INTERNAL MEDICINE | Facility: CLINIC | Age: 74
End: 2019-01-30

## 2019-01-30 NOTE — TELEPHONE ENCOUNTER
Patient and daughter aware    ----- Message from Selena Davis MD sent at 1/30/2019  9:29 AM EST -----  Call pt about labs.  a1c much better.  8.5 -> 6.9.  Cholesterol still high.  Once pt stronger will try to get statin on board.  Cbc, cmp fine

## 2019-04-04 DIAGNOSIS — E11.44 DIABETIC AMYOTROPHY ASSOCIATED WITH TYPE 2 DIABETES MELLITUS (HCC): ICD-10-CM

## 2019-04-04 DIAGNOSIS — F43.21 SITUATIONAL DEPRESSION: ICD-10-CM

## 2019-04-04 DIAGNOSIS — G47.9 SLEEP DIFFICULTIES: ICD-10-CM

## 2019-04-04 RX ORDER — ZOLPIDEM TARTRATE 5 MG/1
TABLET ORAL
Qty: 90 TABLET | Refills: 1 | Status: SHIPPED | OUTPATIENT
Start: 2019-04-04 | End: 2019-04-30 | Stop reason: SDUPTHER

## 2019-04-04 RX ORDER — DULOXETIN HYDROCHLORIDE 60 MG/1
CAPSULE, DELAYED RELEASE ORAL
Qty: 90 CAPSULE | Refills: 3 | Status: SHIPPED | OUTPATIENT
Start: 2019-04-04 | End: 2020-03-09

## 2019-04-05 ENCOUNTER — RESULTS ENCOUNTER (OUTPATIENT)
Dept: INTERNAL MEDICINE | Facility: CLINIC | Age: 74
End: 2019-04-05

## 2019-04-05 DIAGNOSIS — Z79.899 HIGH RISK MEDICATION USE: Primary | ICD-10-CM

## 2019-04-05 DIAGNOSIS — Z79.899 HIGH RISK MEDICATION USE: ICD-10-CM

## 2019-04-10 ENCOUNTER — TELEPHONE (OUTPATIENT)
Dept: INTERNAL MEDICINE | Facility: CLINIC | Age: 74
End: 2019-04-10

## 2019-04-10 NOTE — TELEPHONE ENCOUNTER
Called back and order given.    ----- Message from Marley Albright sent at 4/10/2019  3:15 PM EDT -----  Regarding: HOME HEALTH  [facility]    I received a call from Divya at [facility], regarding physical therapy. Divya said that she is ready to release her from physical therapy because she has met her goals. Patient really wants to be able to take a bath.  Divya said that the daughter would like for her to have occupational therapy so that she can learn to maneuver to and from the tub. They are asking for a verbal  Please call her at [phone]    Thanks!  marley

## 2019-04-30 ENCOUNTER — RESULTS ENCOUNTER (OUTPATIENT)
Dept: INTERNAL MEDICINE | Facility: CLINIC | Age: 74
End: 2019-04-30

## 2019-04-30 ENCOUNTER — OFFICE VISIT (OUTPATIENT)
Dept: INTERNAL MEDICINE | Facility: CLINIC | Age: 74
End: 2019-04-30

## 2019-04-30 VITALS
DIASTOLIC BLOOD PRESSURE: 84 MMHG | OXYGEN SATURATION: 96 % | BODY MASS INDEX: 35.3 KG/M2 | WEIGHT: 179.8 LBS | HEART RATE: 78 BPM | HEIGHT: 60 IN | SYSTOLIC BLOOD PRESSURE: 134 MMHG | RESPIRATION RATE: 14 BRPM

## 2019-04-30 DIAGNOSIS — Z00.00 MEDICARE ANNUAL WELLNESS VISIT, INITIAL: Primary | ICD-10-CM

## 2019-04-30 DIAGNOSIS — E11.8 TYPE 2 DIABETES MELLITUS WITH COMPLICATION, WITHOUT LONG-TERM CURRENT USE OF INSULIN (HCC): ICD-10-CM

## 2019-04-30 DIAGNOSIS — E78.2 MIXED HYPERLIPIDEMIA: ICD-10-CM

## 2019-04-30 DIAGNOSIS — Z12.31 BREAST CANCER SCREENING BY MAMMOGRAM: ICD-10-CM

## 2019-04-30 DIAGNOSIS — Z12.11 COLON CANCER SCREENING: ICD-10-CM

## 2019-04-30 DIAGNOSIS — Z78.0 POST-MENOPAUSAL: ICD-10-CM

## 2019-04-30 DIAGNOSIS — E13.44 DIABETIC AMYOTROPHY ASSOCIATED WITH OTHER SPECIFIED DIABETES MELLITUS (HCC): ICD-10-CM

## 2019-04-30 DIAGNOSIS — Z00.00 MEDICARE ANNUAL WELLNESS VISIT, INITIAL: ICD-10-CM

## 2019-04-30 DIAGNOSIS — E11.44 DIABETIC AMYOTROPHY ASSOCIATED WITH TYPE 2 DIABETES MELLITUS (HCC): ICD-10-CM

## 2019-04-30 DIAGNOSIS — G47.9 SLEEP DIFFICULTIES: ICD-10-CM

## 2019-04-30 LAB
POC CREATININE URINE: 200
POC MICROALBUMIN URINE: 30

## 2019-04-30 PROCEDURE — 82570 ASSAY OF URINE CREATININE: CPT | Performed by: INTERNAL MEDICINE

## 2019-04-30 PROCEDURE — 82044 UR ALBUMIN SEMIQUANTITATIVE: CPT | Performed by: INTERNAL MEDICINE

## 2019-04-30 PROCEDURE — 99214 OFFICE O/P EST MOD 30 MIN: CPT | Performed by: INTERNAL MEDICINE

## 2019-04-30 PROCEDURE — G0438 PPPS, INITIAL VISIT: HCPCS | Performed by: INTERNAL MEDICINE

## 2019-04-30 RX ORDER — ZOLPIDEM TARTRATE 5 MG/1
5 TABLET ORAL NIGHTLY PRN
Qty: 90 TABLET | Refills: 1 | Status: SHIPPED | OUTPATIENT
Start: 2019-04-30 | End: 2019-11-05 | Stop reason: SDUPTHER

## 2019-04-30 RX ORDER — OXYCODONE AND ACETAMINOPHEN 7.5; 325 MG/1; MG/1
1 TABLET ORAL EVERY 4 HOURS PRN
Qty: 90 TABLET | Refills: 0 | Status: SHIPPED | OUTPATIENT
Start: 2019-04-30 | End: 2019-07-30 | Stop reason: SDUPTHER

## 2019-04-30 RX ORDER — OXYCODONE AND ACETAMINOPHEN 7.5; 325 MG/1; MG/1
1 TABLET ORAL EVERY 4 HOURS PRN
Qty: 90 TABLET | Refills: 0 | Status: SHIPPED | OUTPATIENT
Start: 2019-04-30 | End: 2019-04-30 | Stop reason: SDUPTHER

## 2019-04-30 NOTE — PROGRESS NOTES
QUICK REFERENCE INFORMATION:  The ABCs of the Annual Wellness Visit    Initial Medicare Wellness Visit    HEALTH RISK ASSESSMENT    : 1945    Recent Hospitalizations:  No hospitalization(s) within the last year..  ccc      Current Medical Providers:  Patient Care Team:  Selena Davis MD as PCP - General (Internal Medicine & Pediatrics)  Gerard Mon MD as PCP - Claims Attributed  Nikolay Vazquez MD as Consulting Physician (Neurology)        Smoking Status:  Social History     Tobacco Use   Smoking Status Never Smoker       Alcohol Consumption:  Social History     Substance and Sexual Activity   Alcohol Use No       Depression Screen:   PHQ-2/PHQ-9 Depression Screening 2019   Little interest or pleasure in doing things 0   Feeling down, depressed, or hopeless 0   Trouble falling or staying asleep, or sleeping too much 0   Feeling tired or having little energy 0   Poor appetite or overeating 0   Feeling bad about yourself - or that you are a failure or have let yourself or your family down 0   Trouble concentrating on things, such as reading the newspaper or watching television 0   Moving or speaking so slowly that other people could have noticed. Or the opposite - being so fidgety or restless that you have been moving around a lot more than usual 0   Thoughts that you would be better off dead, or of hurting yourself in some way 0   Total Score 0   If you checked off any problems, how difficult have these problems made it for you to do your work, take care of things at home, or get along with other people? Not difficult at all       Health Habits and Functional and Cognitive Screening:  Functional & Cognitive Status 2019   Current Diet Diabetic Diet   Dental Exam Up to date   Eye Exam Up to date   Exercise (times per week) 0 times per week   Current Exercise Activities Include None   Do you need help using the phone?  No   Are you deaf or do you have serious difficulty hearing?   No   Do you need help with transportation? Yes   Do you need help shopping? No   Do you need help with housework?  No   Do you need help with laundry? No   Do you need help taking your medications? No   Do you need help managing money? No   Do you ever drive or ride in a car without wearing a seat belt? No   Have you felt unusual stress, anger or loneliness in the last month? No   Who do you live with? Spouse   If you need help, do you have trouble finding someone available to you? No   Have you been bothered in the last four weeks by sexual problems? No   Do you have difficulty concentrating, remembering or making decisions? No           Does the patient have evidence of cognitive impairment? No    Asiprin use counseling: Does not need ASA (and currently is not on it)      Recent Lab Results:    Lab Results   Component Value Date     (H) 01/29/2019     Lab Results   Component Value Date    HGBA1C 6.90 (H) 01/29/2019     Lab Results   Component Value Date    TRIG 385 (H) 01/29/2019    HDL 49 01/29/2019    VLDL 77 (H) 01/29/2019    LDLHDL 3.02 01/29/2019           Age-appropriate Screening Schedule:  Refer to the list below for future screening recommendations based on patient's age, sex and/or medical conditions. Orders for these recommended tests are listed in the plan section. The patient has been provided with a written plan.    Health Maintenance   Topic Date Due   • TDAP/TD VACCINES (1 - Tdap) 07/14/1965   • ZOSTER VACCINE (2 of 2) 03/29/2010   • DIABETIC FOOT EXAM  07/01/2017   • MAMMOGRAM  05/27/2018   • DXA SCAN  04/30/2019   • DIABETIC EYE EXAM  06/05/2019   • HEMOGLOBIN A1C  07/29/2019   • INFLUENZA VACCINE  08/01/2019   • LIPID PANEL  01/29/2020   • URINE MICROALBUMIN  04/30/2020   • PNEUMOCOCCAL VACCINES (65+ LOW/MEDIUM RISK)  Completed        Subjective   History of Present Illness    Lydia John is a 73 y.o. female who presents for an Annual Wellness Visit.    The following portions of the  patient's history were reviewed and updated as appropriate: allergies, current medications, past family history, past medical history, past social history, past surgical history and problem list.    Outpatient Medications Prior to Visit   Medication Sig Dispense Refill   • amitriptyline (ELAVIL) 75 MG tablet Take 1 tablet by mouth Every Night. 90 tablet 3   • calcium citrate-vitamin d (CITRACAL) 200-250 MG-UNIT tablet tablet Take 1 tablet by mouth daily.     • docusate sodium (COLACE) 100 MG capsule Take 100 mg by mouth 3 (Three) Times a Day As Needed for constipation.     • DULoxetine (CYMBALTA) 60 MG capsule TAKE 1 CAPSULE BY MOUTH ONCE DAILY 90 capsule 3   • glucose blood test strip Use one strip to check glucose three times daily before meals 100 each 11   • ibuprofen (ADVIL,MOTRIN) 200 MG tablet Take 200 mg by mouth.     • JANUVIA 100 MG tablet TAKE 1 TABLET BY MOUTH ONCE DAILY 90 tablet 1   • metFORMIN ER (GLUCOPHAGE-XR) 500 MG 24 hr tablet Take 1 tablet by mouth Daily With Breakfast. (Patient taking differently: Take 500 mg by mouth Daily With Breakfast. ALSO TAKING 250MG AT NIGHT,HALF TABLET) 90 tablet 3   • omeprazole (priLOSEC) 20 MG capsule Take 1 capsule by mouth Daily. 90 capsule 3   • vitamin B-12 (CYANOCOBALAMIN) 100 MCG tablet Take 50 mcg by mouth daily.     • vitamin D (ERGOCALCIFEROL) 31869 units capsule capsule Take 1 capsule by mouth Every 7 (Seven) Days. 13 capsule 4   • zolpidem (AMBIEN) 5 MG tablet TAKE 1 TABLET BY MOUTH NIGHTLY AS NEEDED FOR SLEEP 90 tablet 1   • oxyCODONE-acetaminophen (PERCOCET) 7.5-325 MG per tablet Take 1 tablet by mouth Every 4 (Four) Hours As Needed for moderate pain (4-6). 90 tablet 0     No facility-administered medications prior to visit.        Patient Active Problem List   Diagnosis   • Peripheral polyneuropathy   • Type 2 diabetes mellitus (CMS/HCC)   • Left foot pain   • Post-menopausal   • Breast cancer screening   • Diabetic amyotrophy (CMS/HCC)   • Proximal  "limb muscle weakness   • Hyponatremia   • Situational depression   • Mixed hyperlipidemia   • Vitamin D deficiency   • Paraparesis (CMS/HCC)   • Weakness of lower extremity       Advance Care Planning:  Patient does not have an advance directive - information provided to the patient today    Identification of Risk Factors:  Risk factors include: weight , increased fall risk, chronic pain, polypharmacy and leg weakness.    Review of Systems    Compared to one year ago, the patient feels her physical health is the same.  Compared to one year ago, the patient feels her mental health is the same.    Objective     Physical Exam    Vitals:    04/30/19 1306   BP: 134/84   Pulse: 78   Resp: 14   SpO2: 96%   Weight: 81.6 kg (179 lb 12.8 oz)   Height: 152.4 cm (60\")       Patient's Body mass index is 35.11 kg/m². BMI is above normal parameters. Recommendations include: exercise counseling and nutrition counseling.      Assessment/Plan   Patient Self-Management and Personalized Health Advice  The patient has been provided with information about: diet, exercise, fall prevention and designing advance directives and preventive services including:   · Advance directive, Bone densitometry screening, Colorectal cancer screening, cologuard test ordered, Exercise counseling provided, Zostavax vaccine (Herpes Zoster).    Visit Diagnoses:    ICD-10-CM ICD-9-CM   1. Medicare annual wellness visit, initial Z00.00 V70.0   2. Sleep difficulties G47.9 780.50   3. 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 (CMS/[facility]) E13.44 250.60     353.5   5. Type 2 diabetes mellitus with complication, without long-term current use of insulin (CMS/[facility]) E11.8 250.90   6. Mixed hyperlipidemia E78.2 272.2   7. Post-menopausal Z78.0 V49.81   8. Colon cancer screening Z12.11 V76.51   9. Breast cancer screening by mammogram Z12.31 V76.12 "       Orders Placed This Encounter   Procedures   • Mammo Screening Bilateral With CAD     Standing Status:   Future     Standing Expiration Date:   4/30/2020     Order Specific Question:   Reason for Exam:     Answer:   screening   • DEXA Bone Density Axial     Standing Status:   Future     Standing Expiration Date:   4/30/2020     Order Specific Question:   Reason for Exam:     Answer:   screening   • Comprehensive Metabolic Panel   • T4, Free   • TSH   • Lipid Panel With LDL / HDL Ratio   • Hemoglobin A1c   • Hepatitis C Antibody   • Cologuard - Stool, Per Rectum     Standing Status:   Future     Standing Expiration Date:   4/30/2020   • CBC & Differential     Order Specific Question:   Manual Differential     Answer:   No       Outpatient Encounter Medications as of 4/30/2019   Medication Sig Dispense Refill   • amitriptyline (ELAVIL) 75 MG tablet Take 1 tablet by mouth Every Night. 90 tablet 3   • calcium citrate-vitamin d (CITRACAL) 200-250 MG-UNIT tablet tablet Take 1 tablet by mouth daily.     • docusate sodium (COLACE) 100 MG capsule Take 100 mg by mouth 3 (Three) Times a Day As Needed for constipation.     • DULoxetine (CYMBALTA) 60 MG capsule TAKE 1 CAPSULE BY MOUTH ONCE DAILY 90 capsule 3   • glucose blood test strip Use one strip to check glucose three times daily before meals 100 each 11   • ibuprofen (ADVIL,MOTRIN) 200 MG tablet Take 200 mg by mouth.     • JANUVIA 100 MG tablet TAKE 1 TABLET BY MOUTH ONCE DAILY 90 tablet 1   • metFORMIN ER (GLUCOPHAGE-XR) 500 MG 24 hr tablet Take 1 tablet by mouth Daily With Breakfast. (Patient taking differently: Take 500 mg by mouth Daily With Breakfast. ALSO TAKING 250MG AT NIGHT,HALF TABLET) 90 tablet 3   • omeprazole (priLOSEC) 20 MG capsule Take 1 capsule by mouth Daily. 90 capsule 3   • vitamin B-12 (CYANOCOBALAMIN) 100 MCG tablet Take 50 mcg by mouth daily.     • vitamin D (ERGOCALCIFEROL) 56162 units capsule capsule Take 1 capsule by mouth Every 7 (Seven)  Days. 13 capsule 4   • zolpidem (AMBIEN) 5 MG tablet Take 1 tablet by mouth At Night As Needed for Sleep. 90 tablet 1   • [DISCONTINUED] zolpidem (AMBIEN) 5 MG tablet TAKE 1 TABLET BY MOUTH NIGHTLY AS NEEDED FOR SLEEP 90 tablet 1   • oxyCODONE-acetaminophen (PERCOCET) 7.5-325 MG per tablet Take 1 tablet by mouth Every 4 (Four) Hours As Needed for Moderate Pain . 90 tablet 0   • [DISCONTINUED] oxyCODONE-acetaminophen (PERCOCET) 7.5-325 MG per tablet Take 1 tablet by mouth Every 4 (Four) Hours As Needed for moderate pain (4-6). 90 tablet 0   • [DISCONTINUED] oxyCODONE-acetaminophen (PERCOCET) 7.5-325 MG per tablet Take 1 tablet by mouth Every 4 (Four) Hours As Needed for Moderate Pain . 90 tablet 0     No facility-administered encounter medications on file as of 4/30/2019.        Reviewed use of high risk medication in the elderly: yes  Reviewed for potential of harmful drug interactions in the elderly: yes    Follow Up:  Return in about 3 months (around 7/30/2019) for Recheck, labs.     An After Visit Summary and PPPS with all of these plans were given to the patient.

## 2019-04-30 NOTE — PROGRESS NOTES
Lydia John is a 73 y.o. female, who presents with a chief complaint of   Chief Complaint   Patient presents with   • Diabetes   • Insomnia   • Medicare Wellness-subsequent       HPI   The pt is here for follow up.    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  She is doing better with transfers.  Today she is walking with her walker.  She feels like she is doing much better with pain control.  She takes cbd oil and opioids as needed.  She usually takes a pain pill in the afternoon or at night.  she does better during the day.  Discussed making sure she does her exercises daily.      HTN - well controlled.  No ha dizziness.      DM - glucoses overall better.  She has her glucose log and almost all glucoses are under 150. She hsa not had any hypoglycemia.  She has given up her few sweet treats.  She is on metformin ER daily but has gi side effects with higher dose.  She has had some urge incontinence especially with stool.       HLD - trying to eat healthy diet.  discussed adding protein to her diet.      Sleep difficulties - pt takes ambien often at night bc her legs give her issues.   Her daughter is worried she is sleeping too much.  The pt says that most night she feels like she could get up and she doesn't feel groggy the next day.    The following portions of the patient's history were reviewed and updated as appropriate: allergies, current medications, past family history, past medical history, past social history, past surgical history and problem list.    Allergies: Patient has no known allergies.    Review of Systems   Constitutional: Negative.    HENT: Negative.    Eyes: Negative.    Respiratory: Negative.    Cardiovascular: Negative.    Gastrointestinal: Negative.    Endocrine: Negative.    Genitourinary: Negative.    Musculoskeletal: Negative.    Skin: Negative.    Allergic/Immunologic: Negative.    Neurological: Negative.    Hematological: Negative.    Psychiatric/Behavioral:  Negative.    All other systems reviewed and are negative.            Wt Readings from Last 3 Encounters:   04/30/19 81.6 kg (179 lb 12.8 oz)   01/29/19 81.6 kg (180 lb)   10/30/18 80.3 kg (177 lb)     Temp Readings from Last 3 Encounters:   01/29/19 97.7 °F (36.5 °C) (Oral)   06/29/16 98.1 °F (36.7 °C)   05/31/16 98 °F (36.7 °C)     BP Readings from Last 3 Encounters:   04/30/19 134/84   01/29/19 134/76   10/30/18 124/80     Pulse Readings from Last 3 Encounters:   04/30/19 78   01/29/19 90   10/30/18 92     Body mass index is 35.11 kg/m².  @LASTSAO2(3)@    Physical Exam   Constitutional: She is oriented to person, place, and time. She appears well-developed and well-nourished. No distress.   HENT:   Head: Normocephalic and atraumatic.   Right Ear: External ear normal.   Left Ear: External ear normal.   Nose: Nose normal.   Mouth/Throat: Oropharynx is clear and moist.   Eyes: Conjunctivae and EOM are normal. Pupils are equal, round, and reactive to light.   Neck: Normal range of motion. Neck supple.   Cardiovascular: Normal rate, regular rhythm, normal heart sounds and intact distal pulses.   Pulmonary/Chest: Effort normal and breath sounds normal. No respiratory distress. She has no wheezes.   Musculoskeletal: Normal range of motion.   Normal gait   Neurological: She is alert and oriented to person, place, and time.   Skin: Skin is warm and dry.   Psychiatric: She has a normal mood and affect. Her behavior is normal. Judgment and thought content normal.   Nursing note and vitals reviewed.      Results for orders placed or performed in visit on 01/29/19   Comprehensive Metabolic Panel   Result Value Ref Range    Glucose 189 (H) 65 - 99 mg/dL    BUN 15 8 - 23 mg/dL    Creatinine 0.78 0.57 - 1.00 mg/dL    eGFR Non African Am 73 >60 mL/min/1.73    eGFR African Am 88 >60 mL/min/1.73    BUN/Creatinine Ratio 19.2 7.0 - 25.0    Sodium 138 136 - 145 mmol/L    Potassium 4.5 3.5 - 5.2 mmol/L    Chloride 96 (L) 98 - 107 mmol/L     Total CO2 25.8 22.0 - 29.0 mmol/L    Calcium 9.4 8.8 - 10.5 mg/dL    Total Protein 6.7 6.0 - 8.5 g/dL    Albumin 4.20 3.50 - 5.20 g/dL    Globulin 2.5 gm/dL    A/G Ratio 1.7 g/dL    Total Bilirubin 0.2 0.2 - 1.2 mg/dL    Alkaline Phosphatase 112 40 - 129 U/L    AST (SGOT) 18 5 - 32 U/L    ALT (SGPT) 19 5 - 33 U/L   Lipid Panel With LDL / HDL Ratio   Result Value Ref Range    Total Cholesterol 274 (H) 0 - 200 mg/dL    Triglycerides 385 (H) 0 - 150 mg/dL    HDL Cholesterol 49 40 - 60 mg/dL    VLDL Cholesterol 77 (H) 7 - 27 mg/dL    LDL Cholesterol  148 (H) 0 - 100 mg/dL    LDL/HDL Ratio 3.02    Hemoglobin A1c   Result Value Ref Range    Hemoglobin A1C 6.90 (H) 4.80 - 5.60 %   CBC & Differential   Result Value Ref Range    WBC 8.79 4.80 - 10.80 10*3/mm3    RBC 4.56 4.20 - 5.40 10*6/mm3    Hemoglobin 13.6 12.0 - 16.0 g/dL    Hematocrit 42.0 37.0 - 47.0 %    MCV 92.1 81.0 - 99.0 fL    MCH 29.8 27.0 - 31.0 pg    MCHC 32.4 31.0 - 37.0 g/dL    RDW 13.2 11.5 - 14.5 %    Platelets 237 140 - 500 10*3/mm3    Neutrophil Rel % 70.0 45.0 - 70.0 %    Lymphocyte Rel % 19.7 (L) 20.0 - 45.0 %    Monocyte Rel % 5.9 3.0 - 8.0 %    Eosinophil Rel % 3.6 0.0 - 4.0 %    Basophil Rel % 0.2 0.0 - 2.0 %    Neutrophils Absolute 6.15 1.50 - 8.30 10*3/mm3    Lymphocytes Absolute 1.73 0.60 - 4.80 10*3/mm3    Monocytes Absolute 0.52 0.00 - 1.00 10*3/mm3    Eosinophils Absolute 0.32 (H) 0.10 - 0.30 10*3/mm3    Basophils Absolute 0.02 0.00 - 0.20 10*3/mm3    Immature Granulocyte Rel % 0.6 (H) 0.0 - 0.5 %    Immature Grans Absolute 0.05 (H) 0.00 - 0.03 10*3/mm3    nRBC 0.0 0.0 - 0.0 /100 WBC           Lydia was seen today for diabetes, insomnia and medicare wellness-subsequent.    Diagnoses and all orders for this visit:    Medicare annual wellness visit, initial  -     Hepatitis C Antibody  -     Cologuard - Stool, Per Rectum; Future  -     Mammo Screening Bilateral With CAD; Future  -     DEXA Bone Density Axial; Future    Sleep difficulties  -      zolpidem (AMBIEN) 5 MG tablet; Take 1 tablet by mouth At Night As Needed for Sleep.    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  -     oxyCODONE-acetaminophen (PERCOCET) 7.5-325 MG per tablet; Take 1 tablet by mouth Every 4 (Four) Hours As Needed for Moderate Pain .    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 (CMS/HCC)    Type 2 diabetes mellitus with complication, without long-term current use of insulin (CMS/HCC)  -     Comprehensive Metabolic Panel  -     CBC & Differential  -     T4, Free  -     TSH  -     Lipid Panel With LDL / HDL Ratio  -     Hemoglobin A1c    Mixed hyperlipidemia  -     Comprehensive Metabolic Panel  -     Lipid Panel With LDL / HDL Ratio    Post-menopausal  -     DEXA Bone Density Axial; Future    Colon cancer screening  -     Cologuard - Stool, Per Rectum; Future    Breast cancer screening by mammogram  -     Mammo Screening Bilateral With CAD; Future      Encouraged healthy diet/exercise.  Try to take 1/2 ambien rather than whole ambien at night.      Outpatient Medications Prior to Visit   Medication Sig Dispense Refill   • amitriptyline (ELAVIL) 75 MG tablet Take 1 tablet by mouth Every Night. 90 tablet 3   • calcium citrate-vitamin d (CITRACAL) 200-250 MG-UNIT tablet tablet Take 1 tablet by mouth daily.     • docusate sodium (COLACE) 100 MG capsule Take 100 mg by mouth 3 (Three) Times a Day As Needed for constipation.     • DULoxetine (CYMBALTA) 60 MG capsule TAKE 1 CAPSULE BY MOUTH ONCE DAILY 90 capsule 3   • glucose blood test strip Use one strip to check glucose three times daily before meals 100 each 11   • ibuprofen (ADVIL,MOTRIN) 200 MG tablet Take 200 mg by mouth.     • JANUVIA 100 MG tablet TAKE 1 TABLET BY MOUTH ONCE DAILY 90 tablet 1   • metFORMIN ER (GLUCOPHAGE-XR)  500 MG 24 hr tablet Take 1 tablet by mouth Daily With Breakfast. (Patient taking differently: Take 500 mg by mouth Daily With Breakfast. ALSO TAKING 250MG AT NIGHT,HALF TABLET) 90 tablet 3   • omeprazole (priLOSEC) 20 MG capsule Take 1 capsule by mouth Daily. 90 capsule 3   • vitamin B-12 (CYANOCOBALAMIN) 100 MCG tablet Take 50 mcg by mouth daily.     • vitamin D (ERGOCALCIFEROL) 28825 units capsule capsule Take 1 capsule by mouth Every 7 (Seven) Days. 13 capsule 4   • zolpidem (AMBIEN) 5 MG tablet TAKE 1 TABLET BY MOUTH NIGHTLY AS NEEDED FOR SLEEP 90 tablet 1   • oxyCODONE-acetaminophen (PERCOCET) 7.5-325 MG per tablet Take 1 tablet by mouth Every 4 (Four) Hours As Needed for moderate pain (4-6). 90 tablet 0     No facility-administered medications prior to visit.      New Medications Ordered This Visit   Medications   • zolpidem (AMBIEN) 5 MG tablet     Sig: Take 1 tablet by mouth At Night As Needed for Sleep.     Dispense:  90 tablet     Refill:  1   • oxyCODONE-acetaminophen (PERCOCET) 7.5-325 MG per tablet     Sig: Take 1 tablet by mouth Every 4 (Four) Hours As Needed for Moderate Pain .     Dispense:  90 tablet     Refill:  0     [unfilled]  Medications Discontinued During This Encounter   Medication Reason   • zolpidem (AMBIEN) 5 MG tablet Reorder   • oxyCODONE-acetaminophen (PERCOCET) 7.5-325 MG per tablet Reorder   • oxyCODONE-acetaminophen (PERCOCET) 7.5-325 MG per tablet Reorder         Return in about 3 months (around 7/30/2019) for Recheck, labs.

## 2019-05-01 LAB
ALBUMIN SERPL-MCNC: 4.5 G/DL (ref 3.5–5.2)
ALBUMIN/GLOB SERPL: 1.9 G/DL
ALP SERPL-CCNC: 125 U/L (ref 39–117)
ALT SERPL-CCNC: 19 U/L (ref 1–33)
AST SERPL-CCNC: 15 U/L (ref 1–32)
BASOPHILS # BLD AUTO: 0.02 10*3/MM3 (ref 0–0.2)
BASOPHILS NFR BLD AUTO: 0.2 % (ref 0–1.5)
BILIRUB SERPL-MCNC: 0.2 MG/DL (ref 0.2–1.2)
BUN SERPL-MCNC: 14 MG/DL (ref 8–23)
BUN/CREAT SERPL: 19.7 (ref 7–25)
CALCIUM SERPL-MCNC: 9.8 MG/DL (ref 8.6–10.5)
CHLORIDE SERPL-SCNC: 98 MMOL/L (ref 98–107)
CHOLEST SERPL-MCNC: 257 MG/DL (ref 0–200)
CO2 SERPL-SCNC: 28.4 MMOL/L (ref 22–29)
CREAT SERPL-MCNC: 0.71 MG/DL (ref 0.57–1)
EOSINOPHIL # BLD AUTO: 0.37 10*3/MM3 (ref 0–0.4)
EOSINOPHIL NFR BLD AUTO: 4.3 % (ref 0.3–6.2)
ERYTHROCYTE [DISTWIDTH] IN BLOOD BY AUTOMATED COUNT: 12.7 % (ref 12.3–15.4)
GLOBULIN SER CALC-MCNC: 2.4 GM/DL
GLUCOSE SERPL-MCNC: 182 MG/DL (ref 65–99)
HBA1C MFR BLD: 7.9 % (ref 4.8–5.6)
HCT VFR BLD AUTO: 41.1 % (ref 34–46.6)
HCV AB S/CO SERPL IA: <0.1 S/CO RATIO (ref 0–0.9)
HDLC SERPL-MCNC: 47 MG/DL (ref 40–60)
HGB BLD-MCNC: 13.5 G/DL (ref 12–15.9)
IMM GRANULOCYTES # BLD AUTO: 0.05 10*3/MM3 (ref 0–0.05)
IMM GRANULOCYTES NFR BLD AUTO: 0.6 % (ref 0–0.5)
LDLC SERPL CALC-MCNC: 152 MG/DL (ref 0–100)
LDLC/HDLC SERPL: 3.23 {RATIO}
LYMPHOCYTES # BLD AUTO: 1.75 10*3/MM3 (ref 0.7–3.1)
LYMPHOCYTES NFR BLD AUTO: 20.1 % (ref 19.6–45.3)
MCH RBC QN AUTO: 30.2 PG (ref 26.6–33)
MCHC RBC AUTO-ENTMCNC: 32.8 G/DL (ref 31.5–35.7)
MCV RBC AUTO: 91.9 FL (ref 79–97)
MONOCYTES # BLD AUTO: 0.53 10*3/MM3 (ref 0.1–0.9)
MONOCYTES NFR BLD AUTO: 6.1 % (ref 5–12)
NEUTROPHILS # BLD AUTO: 5.97 10*3/MM3 (ref 1.7–7)
NEUTROPHILS NFR BLD AUTO: 68.7 % (ref 42.7–76)
NRBC BLD AUTO-RTO: 0 /100 WBC (ref 0–0.2)
PLATELET # BLD AUTO: 252 10*3/MM3 (ref 140–450)
POTASSIUM SERPL-SCNC: 4.5 MMOL/L (ref 3.5–5.2)
PROT SERPL-MCNC: 6.9 G/DL (ref 6–8.5)
RBC # BLD AUTO: 4.47 10*6/MM3 (ref 3.77–5.28)
SODIUM SERPL-SCNC: 139 MMOL/L (ref 136–145)
T4 FREE SERPL-MCNC: 1.01 NG/DL (ref 0.93–1.7)
TRIGL SERPL-MCNC: 291 MG/DL (ref 0–150)
TSH SERPL DL<=0.005 MIU/L-ACNC: 3.82 MIU/ML (ref 0.27–4.2)
VLDLC SERPL CALC-MCNC: 58.2 MG/DL
WBC # BLD AUTO: 8.69 10*3/MM3 (ref 3.4–10.8)

## 2019-07-30 ENCOUNTER — OFFICE VISIT (OUTPATIENT)
Dept: INTERNAL MEDICINE | Facility: CLINIC | Age: 74
End: 2019-07-30

## 2019-07-30 VITALS
RESPIRATION RATE: 16 BRPM | BODY MASS INDEX: 34 KG/M2 | OXYGEN SATURATION: 96 % | DIASTOLIC BLOOD PRESSURE: 90 MMHG | WEIGHT: 173.2 LBS | SYSTOLIC BLOOD PRESSURE: 128 MMHG | HEART RATE: 89 BPM | HEIGHT: 60 IN

## 2019-07-30 DIAGNOSIS — E11.8 TYPE 2 DIABETES MELLITUS WITH COMPLICATION, WITHOUT LONG-TERM CURRENT USE OF INSULIN (HCC): Primary | ICD-10-CM

## 2019-07-30 DIAGNOSIS — G47.9 SLEEP DIFFICULTIES: ICD-10-CM

## 2019-07-30 DIAGNOSIS — E78.2 MIXED HYPERLIPIDEMIA: ICD-10-CM

## 2019-07-30 DIAGNOSIS — E11.44 DIABETIC AMYOTROPHY ASSOCIATED WITH TYPE 2 DIABETES MELLITUS (HCC): ICD-10-CM

## 2019-07-30 PROCEDURE — 99214 OFFICE O/P EST MOD 30 MIN: CPT | Performed by: INTERNAL MEDICINE

## 2019-07-30 RX ORDER — METFORMIN HYDROCHLORIDE 500 MG/1
500 TABLET, EXTENDED RELEASE ORAL
Qty: 90 TABLET | Refills: 3
Start: 2019-07-30 | End: 2020-11-09

## 2019-07-30 RX ORDER — OXYCODONE AND ACETAMINOPHEN 7.5; 325 MG/1; MG/1
1 TABLET ORAL EVERY 8 HOURS PRN
Qty: 90 TABLET | Refills: 0 | Status: SHIPPED | OUTPATIENT
Start: 2019-07-30 | End: 2019-11-05 | Stop reason: SDUPTHER

## 2019-07-30 NOTE — PROGRESS NOTES
Lydia John is a 74 y.o. female, who presents with a chief complaint of   Chief Complaint   Patient presents with   • Diabetes   • Hyperlipidemia       HPI   Pt here with her daughter for follow up. She recently went on a crjise to [place].  She is walking with a walker or cane most of the time.  The strength in her legs seems to be slowly improving    She had a small scab on her left lower leg that is healing but she also got a bruise under it.  The spot is slowly healing.      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  She feels like she is doing much better with pain control.  She takes cbd oil and opioids as needed.  She usually takes a pain pill in the afternoon or at night.  she does better during the day.  Discussed making sure she does her exercises daily.      HTN - well controlled.  No ha dizziness.      DM - glucoses overall better.  She has her glucose log and almost all glucoses are under 150. She hsa not had any hypoglycemia.  She has given up her few sweet treats.  She is on metformin ER daily and tolerating this from a GI stand point.      HLD - trying to eat healthy diet.  discussed adding protein to her diet.       Sleep difficulties - pt takes ambien often at night bc her legs give her issues.   Her daughter is worried she is sleeping too much.  The pt says that most night she feels like she could get up and she doesn't feel groggy the next day.        The following portions of the patient's history were reviewed and updated as appropriate: allergies, current medications, past family history, past medical history, past social history, past surgical history and problem list.    Allergies: Patient has no known allergies.    Review of Systems   Constitutional: Negative.    HENT: Negative.    Eyes: Negative.    Respiratory: Negative.    Cardiovascular: Negative.    Gastrointestinal: Negative.    Endocrine: Negative.    Genitourinary: Negative.     Musculoskeletal: Negative.    Skin: Negative.    Allergic/Immunologic: Negative.    Neurological: Negative.    Hematological: Negative.    Psychiatric/Behavioral: Negative.    All other systems reviewed and are negative.            Wt Readings from Last 3 Encounters:   07/30/19 78.6 kg (173 lb 3.2 oz)   04/30/19 81.6 kg (179 lb 12.8 oz)   01/29/19 81.6 kg (180 lb)     Temp Readings from Last 3 Encounters:   01/29/19 97.7 °F (36.5 °C) (Oral)   06/29/16 98.1 °F (36.7 °C)   05/31/16 98 °F (36.7 °C)     BP Readings from Last 3 Encounters:   07/30/19 128/90   04/30/19 134/84   01/29/19 134/76     Pulse Readings from Last 3 Encounters:   07/30/19 89   04/30/19 78   01/29/19 90     Body mass index is 33.83 kg/m².  @LASTSAO2(3)@    Physical Exam   Constitutional: She is oriented to person, place, and time. She appears well-developed and well-nourished. No distress.   HENT:   Head: Normocephalic and atraumatic.   Right Ear: External ear normal.   Left Ear: External ear normal.   Nose: Nose normal.   Mouth/Throat: Oropharynx is clear and moist.   Eyes: Conjunctivae and EOM are normal. Pupils are equal, round, and reactive to light.   Neck: Normal range of motion. Neck supple.   Cardiovascular: Normal rate, regular rhythm, normal heart sounds and intact distal pulses.   Pulmonary/Chest: Effort normal and breath sounds normal. No respiratory distress. She has no wheezes.   Musculoskeletal: Normal range of motion.   Walking with walker   Neurological: She is alert and oriented to person, place, and time.   Skin: Skin is warm and dry.   Left lower leg with bruise   Psychiatric: She has a normal mood and affect. Her behavior is normal. Judgment and thought content normal.   Nursing note and vitals reviewed.      Results for orders placed or performed in visit on 04/30/19   Comprehensive Metabolic Panel   Result Value Ref Range    Glucose 182 (H) 65 - 99 mg/dL    BUN 14 8 - 23 mg/dL    Creatinine 0.71 0.57 - 1.00 mg/dL    eGFR  Non  Am 81 >60 mL/min/1.73    eGFR African Am 98 >60 mL/min/1.73    BUN/Creatinine Ratio 19.7 7.0 - 25.0    Sodium 139 136 - 145 mmol/L    Potassium 4.5 3.5 - 5.2 mmol/L    Chloride 98 98 - 107 mmol/L    Total CO2 28.4 22.0 - 29.0 mmol/L    Calcium 9.8 8.6 - 10.5 mg/dL    Total Protein 6.9 6.0 - 8.5 g/dL    Albumin 4.50 3.50 - 5.20 g/dL    Globulin 2.4 gm/dL    A/G Ratio 1.9 g/dL    Total Bilirubin 0.2 0.2 - 1.2 mg/dL    Alkaline Phosphatase 125 (H) 39 - 117 U/L    AST (SGOT) 15 1 - 32 U/L    ALT (SGPT) 19 1 - 33 U/L   T4, Free   Result Value Ref Range    Free T4 1.01 0.93 - 1.70 ng/dL   TSH   Result Value Ref Range    TSH 3.820 0.270 - 4.200 mIU/mL   Lipid Panel With LDL / HDL Ratio   Result Value Ref Range    Total Cholesterol 257 (H) 0 - 200 mg/dL    Triglycerides 291 (H) 0 - 150 mg/dL    HDL Cholesterol 47 40 - 60 mg/dL    VLDL Cholesterol 58.2 mg/dL    LDL Cholesterol  152 (H) 0 - 100 mg/dL    LDL/HDL Ratio 3.23    Hemoglobin A1c   Result Value Ref Range    Hemoglobin A1C 7.90 (H) 4.80 - 5.60 %   Hepatitis C Antibody   Result Value Ref Range    Hep C Virus Ab <0.1 0.0 - 0.9 s/co ratio   POCT microalbumin   Result Value Ref Range    Microalbumin, Urine 30     Creatinine, Urine 200    CBC & Differential   Result Value Ref Range    WBC 8.69 3.40 - 10.80 10*3/mm3    RBC 4.47 3.77 - 5.28 10*6/mm3    Hemoglobin 13.5 12.0 - 15.9 g/dL    Hematocrit 41.1 34.0 - 46.6 %    MCV 91.9 79.0 - 97.0 fL    MCH 30.2 26.6 - 33.0 pg    MCHC 32.8 31.5 - 35.7 g/dL    RDW 12.7 12.3 - 15.4 %    Platelets 252 140 - 450 10*3/mm3    Neutrophil Rel % 68.7 42.7 - 76.0 %    Lymphocyte Rel % 20.1 19.6 - 45.3 %    Monocyte Rel % 6.1 5.0 - 12.0 %    Eosinophil Rel % 4.3 0.3 - 6.2 %    Basophil Rel % 0.2 0.0 - 1.5 %    Neutrophils Absolute 5.97 1.70 - 7.00 10*3/mm3    Lymphocytes Absolute 1.75 0.70 - 3.10 10*3/mm3    Monocytes Absolute 0.53 0.10 - 0.90 10*3/mm3    Eosinophils Absolute 0.37 0.00 - 0.40 10*3/mm3    Basophils Absolute 0.02  0.00 - 0.20 10*3/mm3    Immature Granulocyte Rel % 0.6 (H) 0.0 - 0.5 %    Immature Grans Absolute 0.05 0.00 - 0.05 10*3/mm3    nRBC 0.0 0.0 - 0.2 /100 WBC           Lydia was seen today for diabetes and hyperlipidemia.    Diagnoses and all orders for this visit:    Type 2 diabetes mellitus with complication, without long-term current use of insulin (CMS/[facility])  -     Comprehensive Metabolic Panel  -     CBC & Differential  -     Lipid Panel With LDL / HDL Ratio  -     metFORMIN ER (GLUCOPHAGE-XR) 500 MG 24 hr tablet; Take 1 tablet by mouth Daily With Breakfast.  -     Hemoglobin A1c    Diabetic amyotrophy associated with type 2 diabetes mellitus (CMS/[facility])  -     oxyCODONE-acetaminophen (PERCOCET) 7.5-325 MG per tablet; Take 1 tablet by mouth Every 8 (Eight) Hours As Needed for Moderate Pain .    Sleep difficulties    Mixed hyperlipidemia    Other orders  -     glucose blood test strip; Use one strip to check glucose three times daily before meals      The patient has read and signed the [facility] Controlled Substance Contract.  I will continue to see patient for regular follow up appointments.  They are well controlled on their medication.  SILVANO is updated every 3 months. The patient is aware of the potential for addiction and dependence.      Outpatient Medications Prior to Visit   Medication Sig Dispense Refill   • amitriptyline (ELAVIL) 75 MG tablet Take 1 tablet by mouth Every Night. 90 tablet 3   • calcium citrate-vitamin d (CITRACAL) 200-250 MG-UNIT tablet tablet Take 1 tablet by mouth daily.     • docusate sodium (COLACE) 100 MG capsule Take 100 mg by mouth 3 (Three) Times a Day As Needed for constipation.     • DULoxetine (CYMBALTA) 60 MG capsule TAKE 1 CAPSULE BY MOUTH ONCE DAILY 90 capsule 3   • ibuprofen (ADVIL,MOTRIN) 200 MG tablet Take 200 mg by mouth.     • JANUVIA 100 MG tablet TAKE 1 TABLET BY MOUTH ONCE DAILY 90 tablet 1   • omeprazole (priLOSEC) 20 MG capsule Take 1 capsule by mouth Daily. 90  capsule 3   • vitamin B-12 (CYANOCOBALAMIN) 100 MCG tablet Take 50 mcg by mouth daily.     • vitamin D (ERGOCALCIFEROL) 52602 units capsule capsule Take 1 capsule by mouth Every 7 (Seven) Days. 13 capsule 4   • zolpidem (AMBIEN) 5 MG tablet Take 1 tablet by mouth At Night As Needed for Sleep. 90 tablet 1   • glucose blood test strip Use one strip to check glucose three times daily before meals 100 each 11   • metFORMIN ER (GLUCOPHAGE-XR) 500 MG 24 hr tablet Take 1 tablet by mouth Daily With Breakfast. (Patient taking differently: Take 500 mg by mouth Daily With Breakfast. ALSO TAKING 250MG AT NIGHT,HALF TABLET) 90 tablet 3   • oxyCODONE-acetaminophen (PERCOCET) 7.5-325 MG per tablet Take 1 tablet by mouth Every 4 (Four) Hours As Needed for Moderate Pain . 90 tablet 0     No facility-administered medications prior to visit.      New Medications Ordered This Visit   Medications   • oxyCODONE-acetaminophen (PERCOCET) 7.5-325 MG per tablet     Sig: Take 1 tablet by mouth Every 8 (Eight) Hours As Needed for Moderate Pain .     Dispense:  90 tablet     Refill:  0   • metFORMIN ER (GLUCOPHAGE-XR) 500 MG 24 hr tablet     Sig: Take 1 tablet by mouth Daily With Breakfast.     Dispense:  90 tablet     Refill:  3   • glucose blood test strip     Sig: Use one strip to check glucose three times daily before meals     Dispense:  100 each     Refill:  11     For Accu-Chek Mima Plus. Use Dx Code E11.8 & E13.44     [unfilled]  Medications Discontinued During This Encounter   Medication Reason   • oxyCODONE-acetaminophen (PERCOCET) 7.5-325 MG per tablet Reorder   • metFORMIN ER (GLUCOPHAGE-XR) 500 MG 24 hr tablet    • glucose blood test strip Reorder         Return in about 3 months (around 10/30/2019) for Recheck, labs.

## 2019-07-31 LAB
ALBUMIN SERPL-MCNC: 4.2 G/DL (ref 3.5–5.2)
ALBUMIN/GLOB SERPL: 1.6 G/DL
ALP SERPL-CCNC: 107 U/L (ref 39–117)
ALT SERPL-CCNC: 18 U/L (ref 1–33)
AST SERPL-CCNC: 15 U/L (ref 1–32)
BASOPHILS # BLD AUTO: 0.02 10*3/MM3 (ref 0–0.2)
BASOPHILS NFR BLD AUTO: 0.2 % (ref 0–1.5)
BILIRUB SERPL-MCNC: 0.2 MG/DL (ref 0.2–1.2)
BUN SERPL-MCNC: 19 MG/DL (ref 8–23)
BUN/CREAT SERPL: 24.4 (ref 7–25)
CALCIUM SERPL-MCNC: 9.6 MG/DL (ref 8.6–10.5)
CHLORIDE SERPL-SCNC: 97 MMOL/L (ref 98–107)
CHOLEST SERPL-MCNC: 242 MG/DL (ref 0–200)
CO2 SERPL-SCNC: 28.6 MMOL/L (ref 22–29)
CREAT SERPL-MCNC: 0.78 MG/DL (ref 0.57–1)
EOSINOPHIL # BLD AUTO: 0.41 10*3/MM3 (ref 0–0.4)
EOSINOPHIL NFR BLD AUTO: 4.8 % (ref 0.3–6.2)
ERYTHROCYTE [DISTWIDTH] IN BLOOD BY AUTOMATED COUNT: 13.3 % (ref 12.3–15.4)
GLOBULIN SER CALC-MCNC: 2.6 GM/DL
GLUCOSE SERPL-MCNC: 232 MG/DL (ref 65–99)
HBA1C MFR BLD: 8 % (ref 4.8–5.6)
HCT VFR BLD AUTO: 42.3 % (ref 34–46.6)
HDLC SERPL-MCNC: 50 MG/DL (ref 40–60)
HGB BLD-MCNC: 13.3 G/DL (ref 12–15.9)
IMM GRANULOCYTES # BLD AUTO: 0.05 10*3/MM3 (ref 0–0.05)
IMM GRANULOCYTES NFR BLD AUTO: 0.6 % (ref 0–0.5)
LDLC SERPL CALC-MCNC: 124 MG/DL (ref 0–100)
LDLC/HDLC SERPL: 2.48 {RATIO}
LYMPHOCYTES # BLD AUTO: 2.2 10*3/MM3 (ref 0.7–3.1)
LYMPHOCYTES NFR BLD AUTO: 26 % (ref 19.6–45.3)
MCH RBC QN AUTO: 29.2 PG (ref 26.6–33)
MCHC RBC AUTO-ENTMCNC: 31.4 G/DL (ref 31.5–35.7)
MCV RBC AUTO: 93 FL (ref 79–97)
MONOCYTES # BLD AUTO: 0.56 10*3/MM3 (ref 0.1–0.9)
MONOCYTES NFR BLD AUTO: 6.6 % (ref 5–12)
NEUTROPHILS # BLD AUTO: 5.23 10*3/MM3 (ref 1.7–7)
NEUTROPHILS NFR BLD AUTO: 61.8 % (ref 42.7–76)
NRBC BLD AUTO-RTO: 0 /100 WBC (ref 0–0.2)
PLATELET # BLD AUTO: 227 10*3/MM3 (ref 140–450)
POTASSIUM SERPL-SCNC: 5.2 MMOL/L (ref 3.5–5.2)
PROT SERPL-MCNC: 6.8 G/DL (ref 6–8.5)
RBC # BLD AUTO: 4.55 10*6/MM3 (ref 3.77–5.28)
SODIUM SERPL-SCNC: 137 MMOL/L (ref 136–145)
TRIGL SERPL-MCNC: 339 MG/DL (ref 0–150)
VLDLC SERPL CALC-MCNC: 67.8 MG/DL
WBC # BLD AUTO: 8.47 10*3/MM3 (ref 3.4–10.8)

## 2019-11-05 ENCOUNTER — OFFICE VISIT (OUTPATIENT)
Dept: INTERNAL MEDICINE | Facility: CLINIC | Age: 74
End: 2019-11-05

## 2019-11-05 VITALS
WEIGHT: 176.2 LBS | BODY MASS INDEX: 34.59 KG/M2 | HEART RATE: 82 BPM | HEIGHT: 60 IN | DIASTOLIC BLOOD PRESSURE: 84 MMHG | SYSTOLIC BLOOD PRESSURE: 142 MMHG | OXYGEN SATURATION: 97 %

## 2019-11-05 DIAGNOSIS — Z79.899 HIGH RISK MEDICATION USE: ICD-10-CM

## 2019-11-05 DIAGNOSIS — Z12.31 BREAST CANCER SCREENING BY MAMMOGRAM: ICD-10-CM

## 2019-11-05 DIAGNOSIS — I10 ESSENTIAL HYPERTENSION: ICD-10-CM

## 2019-11-05 DIAGNOSIS — Z78.0 POSTMENOPAUSAL: ICD-10-CM

## 2019-11-05 DIAGNOSIS — G47.9 SLEEP DIFFICULTIES: ICD-10-CM

## 2019-11-05 DIAGNOSIS — E11.8 TYPE 2 DIABETES MELLITUS WITH COMPLICATION, WITHOUT LONG-TERM CURRENT USE OF INSULIN (HCC): Primary | ICD-10-CM

## 2019-11-05 DIAGNOSIS — E78.2 MIXED HYPERLIPIDEMIA: ICD-10-CM

## 2019-11-05 DIAGNOSIS — E11.44 DIABETIC AMYOTROPHY ASSOCIATED WITH TYPE 2 DIABETES MELLITUS (HCC): ICD-10-CM

## 2019-11-05 PROCEDURE — G0008 ADMIN INFLUENZA VIRUS VAC: HCPCS | Performed by: INTERNAL MEDICINE

## 2019-11-05 PROCEDURE — 90653 IIV ADJUVANT VACCINE IM: CPT | Performed by: INTERNAL MEDICINE

## 2019-11-05 PROCEDURE — 99214 OFFICE O/P EST MOD 30 MIN: CPT | Performed by: INTERNAL MEDICINE

## 2019-11-05 RX ORDER — ZOLPIDEM TARTRATE 5 MG/1
5 TABLET ORAL NIGHTLY PRN
Qty: 90 TABLET | Refills: 1 | Status: SHIPPED | OUTPATIENT
Start: 2019-11-05 | End: 2020-02-03 | Stop reason: SDUPTHER

## 2019-11-05 RX ORDER — OXYCODONE AND ACETAMINOPHEN 7.5; 325 MG/1; MG/1
1 TABLET ORAL 2 TIMES DAILY PRN
Qty: 60 TABLET | Refills: 0 | Status: SHIPPED | OUTPATIENT
Start: 2019-11-05 | End: 2020-02-03 | Stop reason: SDUPTHER

## 2019-11-05 NOTE — PROGRESS NOTES
Lydia John is a 74 y.o. female, who presents with a chief complaint of   Chief Complaint   Patient presents with   • Follow-up     uses vinicio wilson   • Diabetes   • flu shot hd given       HPI   Pt here for follow up    Diabetic amyotrophy - She is slowly getting stronger and doing better with transfers.  She feels like she is doing much better with pain control.  She takes cbd oil and opioids as needed.  She usually takes a pain pill in the afternoon or at night.  she does better during the day.  Discussed making sure she does her exercises daily.      HTN - well controlled.  No ha dizziness.      DM - glucoses overall better.  She has her glucose log and almost all glucoses are under 150. She has not had any hypoglycemia.  She has given up her few sweet treats.  She is on metformin ER daily and tolerating this from a GI stand point.      HLD - trying to eat healthy diet.  discussed adding protein to her diet.       Sleep difficulties - pt takes ambien often at night bc her legs give her issues.   Her daughter is worried she is sleeping too much.  The pt says that most night she feels like she could get up and she doesn't feel groggy the next day.     She is open to doing dexa and mammogram after [place].  She has had so many doctor appts she just wants to have a normal holiday     The following portions of the patient's history were reviewed and updated as appropriate: allergies, current medications, past family history, past medical history, past social history, past surgical history and problem list.    Allergies: Patient has no known allergies.    Review of Systems   Constitutional: Negative.    HENT: Negative.    Eyes: Negative.    Respiratory: Negative.    Cardiovascular: Negative.    Gastrointestinal: Negative.    Endocrine: Negative.    Genitourinary: Negative.    Musculoskeletal: Negative.    Skin: Negative.    Allergic/Immunologic: Negative.    Neurological: Negative.    Hematological: Negative.     Psychiatric/Behavioral: Negative.    All other systems reviewed and are negative.            Wt Readings from Last 3 Encounters:   11/05/19 79.9 kg (176 lb 3.2 oz)   07/30/19 78.6 kg (173 lb 3.2 oz)   04/30/19 81.6 kg (179 lb 12.8 oz)     Temp Readings from Last 3 Encounters:   01/29/19 97.7 °F (36.5 °C) (Oral)   06/29/16 98.1 °F (36.7 °C)   05/31/16 98 °F (36.7 °C)     BP Readings from Last 3 Encounters:   11/05/19 142/84   07/30/19 128/90   04/30/19 134/84     Pulse Readings from Last 3 Encounters:   11/05/19 82   07/30/19 89   04/30/19 78     Body mass index is 34.41 kg/m².  @LASTSAO2(3)@    Physical Exam   Constitutional: She is oriented to person, place, and time. She appears well-developed and well-nourished. No distress.   HENT:   Head: Normocephalic and atraumatic.   Right Ear: External ear normal.   Left Ear: External ear normal.   Nose: Nose normal.   Mouth/Throat: Oropharynx is clear and moist.   Eyes: Conjunctivae and EOM are normal. Pupils are equal, round, and reactive to light.   Neck: Normal range of motion. Neck supple.   Cardiovascular: Normal rate, regular rhythm, normal heart sounds and intact distal pulses.   Pulmonary/Chest: Effort normal and breath sounds normal. No respiratory distress. She has no wheezes.   Musculoskeletal: She exhibits no edema.   Walks with cane   Neurological: She is alert and oriented to person, place, and time.   Skin: Skin is warm and dry.   Psychiatric: She has a normal mood and affect. Her behavior is normal. Judgment and thought content normal.   Nursing note and vitals reviewed.      Results for orders placed or performed in visit on 07/30/19   Comprehensive Metabolic Panel   Result Value Ref Range    Glucose 232 (H) 65 - 99 mg/dL    BUN 19 8 - 23 mg/dL    Creatinine 0.78 0.57 - 1.00 mg/dL    eGFR Non African Am 72 >60 mL/min/1.73    eGFR African Am 88 >60 mL/min/1.73    BUN/Creatinine Ratio 24.4 7.0 - 25.0    Sodium 137 136 - 145 mmol/L    Potassium 5.2 3.5 -  5.2 mmol/L    Chloride 97 (L) 98 - 107 mmol/L    Total CO2 28.6 22.0 - 29.0 mmol/L    Calcium 9.6 8.6 - 10.5 mg/dL    Total Protein 6.8 6.0 - 8.5 g/dL    Albumin 4.20 3.50 - 5.20 g/dL    Globulin 2.6 gm/dL    A/G Ratio 1.6 g/dL    Total Bilirubin 0.2 0.2 - 1.2 mg/dL    Alkaline Phosphatase 107 39 - 117 U/L    AST (SGOT) 15 1 - 32 U/L    ALT (SGPT) 18 1 - 33 U/L   Lipid Panel With LDL / HDL Ratio   Result Value Ref Range    Total Cholesterol 242 (H) 0 - 200 mg/dL    Triglycerides 339 (H) 0 - 150 mg/dL    HDL Cholesterol 50 40 - 60 mg/dL    VLDL Cholesterol 67.8 mg/dL    LDL Cholesterol  124 (H) 0 - 100 mg/dL    LDL/HDL Ratio 2.48    Hemoglobin A1c   Result Value Ref Range    Hemoglobin A1C 8.00 (H) 4.80 - 5.60 %   CBC & Differential   Result Value Ref Range    WBC 8.47 3.40 - 10.80 10*3/mm3    RBC 4.55 3.77 - 5.28 10*6/mm3    Hemoglobin 13.3 12.0 - 15.9 g/dL    Hematocrit 42.3 34.0 - 46.6 %    MCV 93.0 79.0 - 97.0 fL    MCH 29.2 26.6 - 33.0 pg    MCHC 31.4 (L) 31.5 - 35.7 g/dL    RDW 13.3 12.3 - 15.4 %    Platelets 227 140 - 450 10*3/mm3    Neutrophil Rel % 61.8 42.7 - 76.0 %    Lymphocyte Rel % 26.0 19.6 - 45.3 %    Monocyte Rel % 6.6 5.0 - 12.0 %    Eosinophil Rel % 4.8 0.3 - 6.2 %    Basophil Rel % 0.2 0.0 - 1.5 %    Neutrophils Absolute 5.23 1.70 - 7.00 10*3/mm3    Lymphocytes Absolute 2.20 0.70 - 3.10 10*3/mm3    Monocytes Absolute 0.56 0.10 - 0.90 10*3/mm3    Eosinophils Absolute 0.41 (H) 0.00 - 0.40 10*3/mm3    Basophils Absolute 0.02 0.00 - 0.20 10*3/mm3    Immature Granulocyte Rel % 0.6 (H) 0.0 - 0.5 %    Immature Grans Absolute 0.05 0.00 - 0.05 10*3/mm3    nRBC 0.0 0.0 - 0.2 /100 WBC           Lydia was seen today for follow-up, diabetes and flu shot hd given.    Diagnoses and all orders for this visit:    Type 2 diabetes mellitus with complication, without long-term current use of insulin (CMS/[facility])  -     Comprehensive Metabolic Panel  -     CBC & Differential  -     T4, Free  -     TSH  -     Lipid Panel  With LDL / HDL Ratio  -     Hemoglobin A1c  -     Cancel: Microalbumin / Creatinine Urine Ratio - Urine, Clean Catch    Diabetic amyotrophy associated with type 2 diabetes mellitus (CMS/HCC)  -     oxyCODONE-acetaminophen (PERCOCET) 7.5-325 MG per tablet; Take 1 tablet by mouth 2 (Two) Times a Day As Needed for Moderate Pain .    Breast cancer screening by mammogram    Postmenopausal    Mixed hyperlipidemia  -     Comprehensive Metabolic Panel  -     Lipid Panel With LDL / HDL Ratio    High risk medication use  -     Comprehensive Metabolic Panel  -     CBC & Differential  -     T4, Free  -     TSH  -     Lipid Panel With LDL / HDL Ratio  -     Hemoglobin A1c    Essential hypertension  -     Comprehensive Metabolic Panel  -     CBC & Differential  -     T4, Free  -     TSH    Sleep difficulties  -     zolpidem (AMBIEN) 5 MG tablet; Take 1 tablet by mouth At Night As Needed for Sleep.    Other orders  -     Fluad Tri 65yr+          Outpatient Medications Prior to Visit   Medication Sig Dispense Refill   • amitriptyline (ELAVIL) 75 MG tablet Take 1 tablet by mouth Every Night. 90 tablet 3   • calcium citrate-vitamin d (CITRACAL) 200-250 MG-UNIT tablet tablet Take 1 tablet by mouth daily.     • docusate sodium (COLACE) 100 MG capsule Take 100 mg by mouth 3 (Three) Times a Day As Needed for constipation.     • DULoxetine (CYMBALTA) 60 MG capsule TAKE 1 CAPSULE BY MOUTH ONCE DAILY 90 capsule 3   • glucose blood test strip Use one strip to check glucose three times daily before meals 100 each 11   • ibuprofen (ADVIL,MOTRIN) 200 MG tablet Take 200 mg by mouth.     • JANUVIA 100 MG tablet TAKE 1 TABLET BY MOUTH ONCE DAILY 90 tablet 1   • metFORMIN ER (GLUCOPHAGE-XR) 500 MG 24 hr tablet Take 1 tablet by mouth Daily With Breakfast. 90 tablet 3   • omeprazole (priLOSEC) 20 MG capsule Take 1 capsule by mouth Daily. 90 capsule 3   • vitamin B-12 (CYANOCOBALAMIN) 100 MCG tablet Take 50 mcg by mouth daily.     • vitamin D  (ERGOCALCIFEROL) 52500 units capsule capsule Take 1 capsule by mouth Every 7 (Seven) Days. 13 capsule 4   • oxyCODONE-acetaminophen (PERCOCET) 7.5-325 MG per tablet Take 1 tablet by mouth Every 8 (Eight) Hours As Needed for Moderate Pain . 90 tablet 0   • zolpidem (AMBIEN) 5 MG tablet Take 1 tablet by mouth At Night As Needed for Sleep. 90 tablet 1     No facility-administered medications prior to visit.      New Medications Ordered This Visit   Medications   • oxyCODONE-acetaminophen (PERCOCET) 7.5-325 MG per tablet     Sig: Take 1 tablet by mouth 2 (Two) Times a Day As Needed for Moderate Pain .     Dispense:  60 tablet     Refill:  0   • zolpidem (AMBIEN) 5 MG tablet     Sig: Take 1 tablet by mouth At Night As Needed for Sleep.     Dispense:  90 tablet     Refill:  1     [unfilled]  Medications Discontinued During This Encounter   Medication Reason   • oxyCODONE-acetaminophen (PERCOCET) 7.5-325 MG per tablet Reorder   • zolpidem (AMBIEN) 5 MG tablet Reorder         Return in about 3 months (around 2/5/2020) for Recheck, labs.

## 2019-11-17 DIAGNOSIS — E11.8 TYPE 2 DIABETES MELLITUS WITH COMPLICATION, WITHOUT LONG-TERM CURRENT USE OF INSULIN (HCC): ICD-10-CM

## 2019-11-17 DIAGNOSIS — K29.00 ACUTE GASTRITIS WITHOUT HEMORRHAGE, UNSPECIFIED GASTRITIS TYPE: ICD-10-CM

## 2019-11-18 RX ORDER — OMEPRAZOLE 20 MG/1
CAPSULE, DELAYED RELEASE ORAL
Qty: 90 CAPSULE | Refills: 3 | Status: SHIPPED | OUTPATIENT
Start: 2019-11-18 | End: 2021-02-17

## 2019-11-18 RX ORDER — METFORMIN HYDROCHLORIDE 500 MG/1
TABLET, EXTENDED RELEASE ORAL
Qty: 180 TABLET | Refills: 3 | Status: SHIPPED | OUTPATIENT
Start: 2019-11-18 | End: 2020-01-16 | Stop reason: SDUPTHER

## 2019-12-25 DIAGNOSIS — E13.44 DIABETIC AMYOTROPHY ASSOCIATED WITH OTHER SPECIFIED DIABETES MELLITUS (HCC): ICD-10-CM

## 2019-12-26 RX ORDER — AMITRIPTYLINE HYDROCHLORIDE 75 MG/1
TABLET, FILM COATED ORAL
Qty: 90 TABLET | Refills: 0 | Status: SHIPPED | OUTPATIENT
Start: 2019-12-26 | End: 2020-03-09

## 2020-01-16 ENCOUNTER — OFFICE VISIT (OUTPATIENT)
Dept: INTERNAL MEDICINE | Facility: CLINIC | Age: 75
End: 2020-01-16

## 2020-01-16 VITALS
HEIGHT: 60 IN | HEART RATE: 83 BPM | SYSTOLIC BLOOD PRESSURE: 140 MMHG | RESPIRATION RATE: 16 BRPM | DIASTOLIC BLOOD PRESSURE: 76 MMHG | BODY MASS INDEX: 34.41 KG/M2 | OXYGEN SATURATION: 94 % | TEMPERATURE: 98.6 F

## 2020-01-16 DIAGNOSIS — J40 BRONCHITIS: ICD-10-CM

## 2020-01-16 DIAGNOSIS — J06.9 ACUTE URI: Primary | ICD-10-CM

## 2020-01-16 PROCEDURE — 99213 OFFICE O/P EST LOW 20 MIN: CPT | Performed by: FAMILY MEDICINE

## 2020-01-16 RX ORDER — AZITHROMYCIN 250 MG/1
TABLET, FILM COATED ORAL
Qty: 6 TABLET | Refills: 0 | Status: SHIPPED | OUTPATIENT
Start: 2020-01-16 | End: 2020-05-18

## 2020-01-16 RX ORDER — BENZONATATE 200 MG/1
200 CAPSULE ORAL 3 TIMES DAILY PRN
Qty: 30 CAPSULE | Refills: 0 | Status: SHIPPED | OUTPATIENT
Start: 2020-01-16 | End: 2020-05-18

## 2020-01-16 NOTE — PROGRESS NOTES
Lydia John is a 74 y.o. female, who presents with a chief complaint of   Chief Complaint   Patient presents with   • Cough     has been going on for about a week    • Nasal Congestion   • URI       HPI     Pt presents with the c/o congestion, cough, fatigue X 5 days.  Sometimes she coughs so hard that she gags.  She coughing during the night.  Her  has had similar symptoms.  She denies fevers, sore throat, nausea, vomiting, diarrhea, wheezing, shortness of breath.    The following portions of the patient's history were reviewed and updated as appropriate: allergies, current medications, past family history, past medical history, past social history, past surgical history and problem list.    Allergies: Patient has no known allergies.    Review of Systems   Constitutional: Positive for fatigue. Negative for fever.   HENT: Positive for congestion, sinus pressure and sinus pain (left maxillary). Negative for ear discharge and sore throat.    Eyes: Negative.    Respiratory: Positive for cough. Negative for shortness of breath and wheezing.    Gastrointestinal: Negative for diarrhea, nausea and vomiting.   Skin: Negative for rash.   Psychiatric/Behavioral: Positive for sleep disturbance.             Wt Readings from Last 3 Encounters:   11/05/19 79.9 kg (176 lb 3.2 oz)   07/30/19 78.6 kg (173 lb 3.2 oz)   04/30/19 81.6 kg (179 lb 12.8 oz)     Temp Readings from Last 3 Encounters:   01/16/20 98.6 °F (37 °C) (Oral)   01/29/19 97.7 °F (36.5 °C) (Oral)   06/29/16 98.1 °F (36.7 °C)     BP Readings from Last 3 Encounters:   01/16/20 140/76   11/05/19 142/84   07/30/19 128/90     Pulse Readings from Last 3 Encounters:   01/16/20 83   11/05/19 82   07/30/19 89     Body mass index is 34.41 kg/m².  @LASTSAO2(3)@    Physical Exam   Constitutional: She is oriented to person, place, and time. She appears well-developed and well-nourished. No distress.   HENT:   Head: Normocephalic and atraumatic.   Right Ear:  Tympanic membrane normal.   Left Ear: Tympanic membrane normal.   Nose: Right sinus exhibits no maxillary sinus tenderness and no frontal sinus tenderness. Left sinus exhibits maxillary sinus tenderness. Left sinus exhibits no frontal sinus tenderness.   Eyes: Conjunctivae and EOM are normal.   Neck: Neck supple. No thyromegaly present.   Cardiovascular: Normal rate and regular rhythm.   Pulmonary/Chest: Effort normal and breath sounds normal.   Neurological: She is alert and oriented to person, place, and time.   Skin: Skin is warm and dry.   Psychiatric: She has a normal mood and affect. Her behavior is normal.   Nursing note and vitals reviewed.      Results for orders placed or performed in visit on 07/30/19   Comprehensive Metabolic Panel   Result Value Ref Range    Glucose 232 (H) 65 - 99 mg/dL    BUN 19 8 - 23 mg/dL    Creatinine 0.78 0.57 - 1.00 mg/dL    eGFR Non African Am 72 >60 mL/min/1.73    eGFR African Am 88 >60 mL/min/1.73    BUN/Creatinine Ratio 24.4 7.0 - 25.0    Sodium 137 136 - 145 mmol/L    Potassium 5.2 3.5 - 5.2 mmol/L    Chloride 97 (L) 98 - 107 mmol/L    Total CO2 28.6 22.0 - 29.0 mmol/L    Calcium 9.6 8.6 - 10.5 mg/dL    Total Protein 6.8 6.0 - 8.5 g/dL    Albumin 4.20 3.50 - 5.20 g/dL    Globulin 2.6 gm/dL    A/G Ratio 1.6 g/dL    Total Bilirubin 0.2 0.2 - 1.2 mg/dL    Alkaline Phosphatase 107 39 - 117 U/L    AST (SGOT) 15 1 - 32 U/L    ALT (SGPT) 18 1 - 33 U/L   Lipid Panel With LDL / HDL Ratio   Result Value Ref Range    Total Cholesterol 242 (H) 0 - 200 mg/dL    Triglycerides 339 (H) 0 - 150 mg/dL    HDL Cholesterol 50 40 - 60 mg/dL    VLDL Cholesterol 67.8 mg/dL    LDL Cholesterol  124 (H) 0 - 100 mg/dL    LDL/HDL Ratio 2.48    Hemoglobin A1c   Result Value Ref Range    Hemoglobin A1C 8.00 (H) 4.80 - 5.60 %   CBC & Differential   Result Value Ref Range    WBC 8.47 3.40 - 10.80 10*3/mm3    RBC 4.55 3.77 - 5.28 10*6/mm3    Hemoglobin 13.3 12.0 - 15.9 g/dL    Hematocrit 42.3 34.0 - 46.6 %     MCV 93.0 79.0 - 97.0 fL    MCH 29.2 26.6 - 33.0 pg    MCHC 31.4 (L) 31.5 - 35.7 g/dL    RDW 13.3 12.3 - 15.4 %    Platelets 227 140 - 450 10*3/mm3    Neutrophil Rel % 61.8 42.7 - 76.0 %    Lymphocyte Rel % 26.0 19.6 - 45.3 %    Monocyte Rel % 6.6 5.0 - 12.0 %    Eosinophil Rel % 4.8 0.3 - 6.2 %    Basophil Rel % 0.2 0.0 - 1.5 %    Neutrophils Absolute 5.23 1.70 - 7.00 10*3/mm3    Lymphocytes Absolute 2.20 0.70 - 3.10 10*3/mm3    Monocytes Absolute 0.56 0.10 - 0.90 10*3/mm3    Eosinophils Absolute 0.41 (H) 0.00 - 0.40 10*3/mm3    Basophils Absolute 0.02 0.00 - 0.20 10*3/mm3    Immature Granulocyte Rel % 0.6 (H) 0.0 - 0.5 %    Immature Grans Absolute 0.05 0.00 - 0.05 10*3/mm3    nRBC 0.0 0.0 - 0.2 /100 WBC           Lydia was seen today for cough, nasal congestion and uri.    Diagnoses and all orders for this visit:    Acute URI  -     benzonatate (TESSALON) 200 MG capsule; Take 1 capsule by mouth 3 (Three) Times a Day As Needed for Cough.  -     HYDROcod Polst-CPM Polst ER (TUSSIONEX PENNKINETIC ER) 10-8 MG/5ML ER suspension; Take 5 mL by mouth Every 12 (Twelve) Hours As Needed for Cough.    Bronchitis  -     azithromycin (ZITHROMAX) 250 MG tablet; Take 2 tablets the first day, then 1 tablet daily for 4 days.      Viral.  Symptomatic treatment discussed.  Warning s/sxs discussed.  Pt to start the antibiotic only if worsening over the next 2 days, such as increasing sinus pain.    Outpatient Medications Prior to Visit   Medication Sig Dispense Refill   • amitriptyline (ELAVIL) 75 MG tablet TAKE 1 TABLET BY MOUTH ONCE DAILY NIGHTLY 90 tablet 0   • calcium citrate-vitamin d (CITRACAL) 200-250 MG-UNIT tablet tablet Take 1 tablet by mouth daily.     • docusate sodium (COLACE) 100 MG capsule Take 100 mg by mouth 3 (Three) Times a Day As Needed for constipation.     • DULoxetine (CYMBALTA) 60 MG capsule TAKE 1 CAPSULE BY MOUTH ONCE DAILY 90 capsule 3   • glucose blood test strip Use one strip to check glucose three times  daily before meals 100 each 11   • ibuprofen (ADVIL,MOTRIN) 200 MG tablet Take 200 mg by mouth.     • JANUVIA 100 MG tablet TAKE 1 TABLET BY MOUTH ONCE DAILY 90 tablet 1   • metFORMIN ER (GLUCOPHAGE-XR) 500 MG 24 hr tablet Take 1 tablet by mouth Daily With Breakfast. 90 tablet 3   • omeprazole (priLOSEC) 20 MG capsule TAKE 1 CAPSULE BY MOUTH ONCE DAILY 90 capsule 3   • oxyCODONE-acetaminophen (PERCOCET) 7.5-325 MG per tablet Take 1 tablet by mouth 2 (Two) Times a Day As Needed for Moderate Pain . 60 tablet 0   • vitamin B-12 (CYANOCOBALAMIN) 100 MCG tablet Take 50 mcg by mouth daily.     • vitamin D (ERGOCALCIFEROL) 16437 units capsule capsule Take 1 capsule by mouth Every 7 (Seven) Days. 13 capsule 4   • zolpidem (AMBIEN) 5 MG tablet Take 1 tablet by mouth At Night As Needed for Sleep. 90 tablet 1   • metFORMIN ER (GLUCOPHAGE-XR) 500 MG 24 hr tablet TAKE 1 TABLET BY MOUTH TWICE DAILY 180 tablet 3     No facility-administered medications prior to visit.      New Medications Ordered This Visit   Medications   • benzonatate (TESSALON) 200 MG capsule     Sig: Take 1 capsule by mouth 3 (Three) Times a Day As Needed for Cough.     Dispense:  30 capsule     Refill:  0   • HYDROcod Polst-CPM Polst ER (TUSSIONEX PENNKINETIC ER) 10-8 MG/5ML ER suspension     Sig: Take 5 mL by mouth Every 12 (Twelve) Hours As Needed for Cough.     Dispense:  70 mL     Refill:  0   • azithromycin (ZITHROMAX) 250 MG tablet     Sig: Take 2 tablets the first day, then 1 tablet daily for 4 days.     Dispense:  6 tablet     Refill:  0     [unfilled]  Medications Discontinued During This Encounter   Medication Reason   • metFORMIN ER (GLUCOPHAGE-XR) 500 MG 24 hr tablet Duplicate order         Return if symptoms worsen or fail to improve, for Next scheduled follow up.

## 2020-01-20 RX ORDER — SITAGLIPTIN 100 MG/1
TABLET, FILM COATED ORAL
Qty: 90 TABLET | Refills: 0 | Status: SHIPPED | OUTPATIENT
Start: 2020-01-20 | End: 2021-01-11

## 2020-02-03 ENCOUNTER — OFFICE VISIT (OUTPATIENT)
Dept: INTERNAL MEDICINE | Facility: CLINIC | Age: 75
End: 2020-02-03

## 2020-02-03 ENCOUNTER — RESULTS ENCOUNTER (OUTPATIENT)
Dept: INTERNAL MEDICINE | Facility: CLINIC | Age: 75
End: 2020-02-03

## 2020-02-03 VITALS
DIASTOLIC BLOOD PRESSURE: 78 MMHG | OXYGEN SATURATION: 97 % | WEIGHT: 178.4 LBS | HEART RATE: 100 BPM | RESPIRATION RATE: 16 BRPM | TEMPERATURE: 97.9 F | SYSTOLIC BLOOD PRESSURE: 130 MMHG | HEIGHT: 60 IN | BODY MASS INDEX: 35.02 KG/M2

## 2020-02-03 DIAGNOSIS — Z12.11 COLON CANCER SCREENING: ICD-10-CM

## 2020-02-03 DIAGNOSIS — G47.9 SLEEP DIFFICULTIES: ICD-10-CM

## 2020-02-03 DIAGNOSIS — Z12.31 BREAST CANCER SCREENING BY MAMMOGRAM: ICD-10-CM

## 2020-02-03 DIAGNOSIS — E55.9 VITAMIN D DEFICIENCY: ICD-10-CM

## 2020-02-03 DIAGNOSIS — Z00.00 ENCOUNTER FOR SCREENING AND PREVENTATIVE CARE: ICD-10-CM

## 2020-02-03 DIAGNOSIS — Z78.0 POSTMENOPAUSAL: ICD-10-CM

## 2020-02-03 DIAGNOSIS — R29.898 WEAKNESS OF BOTH LOWER EXTREMITIES: ICD-10-CM

## 2020-02-03 DIAGNOSIS — E11.44 DIABETIC AMYOTROPHY ASSOCIATED WITH TYPE 2 DIABETES MELLITUS (HCC): Primary | ICD-10-CM

## 2020-02-03 DIAGNOSIS — E78.2 MIXED HYPERLIPIDEMIA: ICD-10-CM

## 2020-02-03 PROCEDURE — 99214 OFFICE O/P EST MOD 30 MIN: CPT | Performed by: INTERNAL MEDICINE

## 2020-02-03 RX ORDER — ERGOCALCIFEROL 1.25 MG/1
50000 CAPSULE ORAL
Qty: 13 CAPSULE | Refills: 4 | Status: SHIPPED | OUTPATIENT
Start: 2020-02-03 | End: 2021-04-28

## 2020-02-03 RX ORDER — OXYCODONE AND ACETAMINOPHEN 7.5; 325 MG/1; MG/1
1 TABLET ORAL 2 TIMES DAILY PRN
Qty: 60 TABLET | Refills: 0 | Status: SHIPPED | OUTPATIENT
Start: 2020-02-03 | End: 2021-03-29

## 2020-02-03 RX ORDER — ZOLPIDEM TARTRATE 5 MG/1
5 TABLET ORAL NIGHTLY PRN
Qty: 90 TABLET | Refills: 1 | Status: SHIPPED | OUTPATIENT
Start: 2020-02-03 | End: 2020-08-24

## 2020-02-03 NOTE — PROGRESS NOTES
Lydia John is a 74 y.o. female, who presents with a chief complaint of   Chief Complaint   Patient presents with   • Follow-up   • Diabetes       HPI     HTN-Well controlled, no headache or dizziness. 103/78 today without medication.     DM-On metformin er daily. Denies nausea, vomiting and diarrhea. Brought blood sugar log with her, average 120-140s. Did have very elevated blood sugars up to 250 while on steroid course.     HLD-Working on eating well, staying active with physical limitations. Walking with walker as below.     Sleep-Significantly improved since initiating melatonin, continues to take ambien night. Afraid to not take ambien for fear of insomnia.     Diabetic amyotrophy-Improved mobility, walking with walker. Reports still staying active. Planning a trip to [place] this year.     Recently recovered from bronchitis, initially seen in clinic. Treated with azithromycin and short course of prednisone. Feeling better the last week.     Preventative-Overdue for mamogram (last in 2016), DEXA (last in 2001) and would like cologuard. Last colonoscopy in 2001.     The following portions of the patient's history were reviewed and updated as appropriate: allergies, current medications, past family history, past medical history, past social history, past surgical history and problem list.    Allergies: Patient has no known allergies.    Current Outpatient Medications:   •  amitriptyline (ELAVIL) 75 MG tablet, TAKE 1 TABLET BY MOUTH ONCE DAILY NIGHTLY, Disp: 90 tablet, Rfl: 0  •  benzonatate (TESSALON) 200 MG capsule, Take 1 capsule by mouth 3 (Three) Times a Day As Needed for Cough., Disp: 30 capsule, Rfl: 0  •  calcium citrate-vitamin d (CITRACAL) 200-250 MG-UNIT tablet tablet, Take 1 tablet by mouth daily., Disp: , Rfl:   •  docusate sodium (COLACE) 100 MG capsule, Take 100 mg by mouth 3 (Three) Times a Day As Needed for constipation., Disp: , Rfl:   •  DULoxetine (CYMBALTA) 60 MG capsule, TAKE 1  CAPSULE BY MOUTH ONCE DAILY, Disp: 90 capsule, Rfl: 3  •  glucose blood test strip, Use one strip to check glucose three times daily before meals, Disp: 100 each, Rfl: 11  •  ibuprofen (ADVIL,MOTRIN) 200 MG tablet, Take 200 mg by mouth., Disp: , Rfl:   •  JANUVIA 100 MG tablet, TAKE 1 TABLET BY MOUTH ONCE DAILY, Disp: 90 tablet, Rfl: 0  •  metFORMIN ER (GLUCOPHAGE-XR) 500 MG 24 hr tablet, Take 1 tablet by mouth Daily With Breakfast., Disp: 90 tablet, Rfl: 3  •  omeprazole (priLOSEC) 20 MG capsule, TAKE 1 CAPSULE BY MOUTH ONCE DAILY, Disp: 90 capsule, Rfl: 3  •  oxyCODONE-acetaminophen (PERCOCET) 7.5-325 MG per tablet, Take 1 tablet by mouth 2 (Two) Times a Day As Needed for Moderate Pain ., Disp: 60 tablet, Rfl: 0  •  vitamin B-12 (CYANOCOBALAMIN) 100 MCG tablet, Take 50 mcg by mouth daily., Disp: , Rfl:   •  vitamin D (ERGOCALCIFEROL) 1.25 MG (28090 UT) capsule capsule, Take 1 capsule by mouth Every 7 (Seven) Days., Disp: 13 capsule, Rfl: 4  •  azithromycin (ZITHROMAX) 250 MG tablet, Take 2 tablets the first day, then 1 tablet daily for 4 days., Disp: 6 tablet, Rfl: 0  •  zolpidem (AMBIEN) 5 MG tablet, Take 1 tablet by mouth At Night As Needed for Sleep., Disp: 90 tablet, Rfl: 1  Medications Discontinued During This Encounter   Medication Reason   • HYDROcod Polst-CPM Polst ER (TUSSIONEX PENNKINETIC ER) 10-8 MG/5ML ER suspension *Therapy completed   • vitamin D (ERGOCALCIFEROL) 56529 units capsule capsule Reorder   • oxyCODONE-acetaminophen (PERCOCET) 7.5-325 MG per tablet Reorder   • zolpidem (AMBIEN) 5 MG tablet Reorder       Review of Systems   Constitutional: Negative for activity change, appetite change, fatigue, fever and unexpected weight change.   HENT: Negative for rhinorrhea and sore throat.    Respiratory: Negative for cough and shortness of breath.    Gastrointestinal: Negative for anal bleeding, blood in stool, constipation, diarrhea and nausea.   Genitourinary: Negative for decreased urine volume.  "  Musculoskeletal: Positive for gait problem. Negative for myalgias.   Skin: Positive for wound (Chronic scab on left shin that has been slowly healing).   Allergic/Immunologic: Negative for environmental allergies and food allergies.   Neurological: Negative for dizziness, weakness, light-headedness and headaches.   Hematological: Negative for adenopathy. Does not bruise/bleed easily.             /78 (BP Location: Left arm, Patient Position: Sitting, Cuff Size: Adult)   Pulse 100   Temp 97.9 °F (36.6 °C) (Oral)   Resp 16   Ht 152.4 cm (60\")   Wt 80.9 kg (178 lb 6.4 oz)   LMP  (LMP Unknown)   SpO2 97%   BMI 34.84 kg/m²       Physical Exam   Constitutional: She is oriented to person, place, and time. She appears well-developed and well-nourished.   HENT:   Head: Normocephalic.   Right Ear: External ear normal.   Left Ear: External ear normal.   Nose: Nose normal.   Mouth/Throat: Oropharynx is clear and moist.   Eyes: Pupils are equal, round, and reactive to light. Conjunctivae and EOM are normal.   Neck: Normal range of motion. Neck supple.   Cardiovascular: Normal rate, regular rhythm and normal heart sounds.   Pulmonary/Chest: Effort normal and breath sounds normal. No respiratory distress.   Abdominal: Soft. Bowel sounds are normal. She exhibits no distension. There is no tenderness.   Musculoskeletal: Normal range of motion.   Slow gait, aided with walker    Lymphadenopathy:     She has no cervical adenopathy.   Neurological: She is alert and oriented to person, place, and time. She displays normal reflexes. No cranial nerve deficit.   Skin: Skin is warm and dry. Capillary refill takes less than 2 seconds.   Dime-sized healing scab present over left anterior skin without associated erythema or drainage    Psychiatric: She has a normal mood and affect. Her behavior is normal.   Vitals reviewed.      Lab Results (most recent)     None          Results for orders placed or performed in visit on " 07/30/19   Comprehensive Metabolic Panel   Result Value Ref Range    Glucose 232 (H) 65 - 99 mg/dL    BUN 19 8 - 23 mg/dL    Creatinine 0.78 0.57 - 1.00 mg/dL    eGFR Non African Am 72 >60 mL/min/1.73    eGFR African Am 88 >60 mL/min/1.73    BUN/Creatinine Ratio 24.4 7.0 - 25.0    Sodium 137 136 - 145 mmol/L    Potassium 5.2 3.5 - 5.2 mmol/L    Chloride 97 (L) 98 - 107 mmol/L    Total CO2 28.6 22.0 - 29.0 mmol/L    Calcium 9.6 8.6 - 10.5 mg/dL    Total Protein 6.8 6.0 - 8.5 g/dL    Albumin 4.20 3.50 - 5.20 g/dL    Globulin 2.6 gm/dL    A/G Ratio 1.6 g/dL    Total Bilirubin 0.2 0.2 - 1.2 mg/dL    Alkaline Phosphatase 107 39 - 117 U/L    AST (SGOT) 15 1 - 32 U/L    ALT (SGPT) 18 1 - 33 U/L   Lipid Panel With LDL / HDL Ratio   Result Value Ref Range    Total Cholesterol 242 (H) 0 - 200 mg/dL    Triglycerides 339 (H) 0 - 150 mg/dL    HDL Cholesterol 50 40 - 60 mg/dL    VLDL Cholesterol 67.8 mg/dL    LDL Cholesterol  124 (H) 0 - 100 mg/dL    LDL/HDL Ratio 2.48    Hemoglobin A1c   Result Value Ref Range    Hemoglobin A1C 8.00 (H) 4.80 - 5.60 %   CBC & Differential   Result Value Ref Range    WBC 8.47 3.40 - 10.80 10*3/mm3    RBC 4.55 3.77 - 5.28 10*6/mm3    Hemoglobin 13.3 12.0 - 15.9 g/dL    Hematocrit 42.3 34.0 - 46.6 %    MCV 93.0 79.0 - 97.0 fL    MCH 29.2 26.6 - 33.0 pg    MCHC 31.4 (L) 31.5 - 35.7 g/dL    RDW 13.3 12.3 - 15.4 %    Platelets 227 140 - 450 10*3/mm3    Neutrophil Rel % 61.8 42.7 - 76.0 %    Lymphocyte Rel % 26.0 19.6 - 45.3 %    Monocyte Rel % 6.6 5.0 - 12.0 %    Eosinophil Rel % 4.8 0.3 - 6.2 %    Basophil Rel % 0.2 0.0 - 1.5 %    Neutrophils Absolute 5.23 1.70 - 7.00 10*3/mm3    Lymphocytes Absolute 2.20 0.70 - 3.10 10*3/mm3    Monocytes Absolute 0.56 0.10 - 0.90 10*3/mm3    Eosinophils Absolute 0.41 (H) 0.00 - 0.40 10*3/mm3    Basophils Absolute 0.02 0.00 - 0.20 10*3/mm3    Immature Granulocyte Rel % 0.6 (H) 0.0 - 0.5 %    Immature Grans Absolute 0.05 0.00 - 0.05 10*3/mm3    nRBC 0.0 0.0 - 0.2 /100  AMBER Freeman was seen today for follow-up and diabetes.    Diagnoses and all orders for this visit:    Weakness of both lower extremities  -Continue to stay active, fall precautions. Careful transfers.     Vitamin D deficiency  -Continue Vit D supplementation     Diabetic amyotrophy associated with type 2 diabetes mellitus (CMS/[facility])  -Continue metformin,cymbalta.  blood sugars largely wnl. Repeat CMP, A1c today. Neuropathy persistent but stable, continue lyrica .     Sleep difficulties  -Continue ambien, aware of limitations and sedative qualities     Encounter for screening and preventative care  -Sent orders for mammogram, cologuard and DEXA for screening  -CBC, CMP, A1c as above     Hyperlipidemia   -Lipid panel       Return in about 6 months (around 8/3/2020), or if symptoms worsen or fail to improve.    Christophe Orozco MD  Internal Medicine-Pediatrics, PGY-2    Pt seen and examined independently by me.  Agree with above.

## 2020-02-04 LAB
ALBUMIN SERPL-MCNC: 4.1 G/DL (ref 3.7–4.7)
ALBUMIN/GLOB SERPL: 1.8 {RATIO} (ref 1.2–2.2)
ALP SERPL-CCNC: 79 IU/L (ref 39–117)
ALT SERPL-CCNC: 25 IU/L (ref 0–32)
AST SERPL-CCNC: 22 IU/L (ref 0–40)
BASOPHILS # BLD AUTO: 0 X10E3/UL (ref 0–0.2)
BASOPHILS NFR BLD AUTO: 0 %
BILIRUB SERPL-MCNC: <0.2 MG/DL (ref 0–1.2)
BUN SERPL-MCNC: 21 MG/DL (ref 8–27)
BUN/CREAT SERPL: 23 (ref 12–28)
CALCIUM SERPL-MCNC: 10 MG/DL (ref 8.7–10.3)
CHLORIDE SERPL-SCNC: 93 MMOL/L (ref 96–106)
CHOLEST SERPL-MCNC: 272 MG/DL (ref 100–199)
CO2 SERPL-SCNC: 21 MMOL/L (ref 20–29)
CREAT SERPL-MCNC: 0.91 MG/DL (ref 0.57–1)
EOSINOPHIL # BLD AUTO: 0.4 X10E3/UL (ref 0–0.4)
EOSINOPHIL NFR BLD AUTO: 4 %
ERYTHROCYTE [DISTWIDTH] IN BLOOD BY AUTOMATED COUNT: 13.2 % (ref 11.7–15.4)
GLOBULIN SER CALC-MCNC: 2.3 G/DL (ref 1.5–4.5)
GLUCOSE SERPL-MCNC: 222 MG/DL (ref 65–99)
HBA1C MFR BLD: 7.7 % (ref 4.8–5.6)
HCT VFR BLD AUTO: 40.2 % (ref 34–46.6)
HDLC SERPL-MCNC: 51 MG/DL
HGB BLD-MCNC: 13.3 G/DL (ref 11.1–15.9)
IMM GRANULOCYTES # BLD AUTO: 0.1 X10E3/UL (ref 0–0.1)
IMM GRANULOCYTES NFR BLD AUTO: 1 %
LDLC SERPL CALC-MCNC: 161 MG/DL (ref 0–99)
LDLC/HDLC SERPL: 3.2 RATIO (ref 0–3.2)
LYMPHOCYTES # BLD AUTO: 1.8 X10E3/UL (ref 0.7–3.1)
LYMPHOCYTES NFR BLD AUTO: 19 %
MCH RBC QN AUTO: 29.8 PG (ref 26.6–33)
MCHC RBC AUTO-ENTMCNC: 33.1 G/DL (ref 31.5–35.7)
MCV RBC AUTO: 90 FL (ref 79–97)
MONOCYTES # BLD AUTO: 0.7 X10E3/UL (ref 0.1–0.9)
MONOCYTES NFR BLD AUTO: 7 %
NEUTROPHILS # BLD AUTO: 6.8 X10E3/UL (ref 1.4–7)
NEUTROPHILS NFR BLD AUTO: 69 %
PLATELET # BLD AUTO: 273 X10E3/UL (ref 150–450)
POTASSIUM SERPL-SCNC: 4.9 MMOL/L (ref 3.5–5.2)
PROT SERPL-MCNC: 6.4 G/DL (ref 6–8.5)
RBC # BLD AUTO: 4.47 X10E6/UL (ref 3.77–5.28)
SODIUM SERPL-SCNC: 137 MMOL/L (ref 134–144)
T4 FREE SERPL-MCNC: 1.22 NG/DL (ref 0.82–1.77)
TRIGL SERPL-MCNC: 302 MG/DL (ref 0–149)
TSH SERPL DL<=0.005 MIU/L-ACNC: 3.17 UIU/ML (ref 0.45–4.5)
VLDLC SERPL CALC-MCNC: 60 MG/DL (ref 5–40)
WBC # BLD AUTO: 9.8 X10E3/UL (ref 3.4–10.8)

## 2020-03-04 ENCOUNTER — APPOINTMENT (OUTPATIENT)
Dept: MAMMOGRAPHY | Facility: HOSPITAL | Age: 75
End: 2020-03-04

## 2020-03-04 ENCOUNTER — APPOINTMENT (OUTPATIENT)
Dept: BONE DENSITY | Facility: HOSPITAL | Age: 75
End: 2020-03-04

## 2020-03-08 DIAGNOSIS — E11.44 DIABETIC AMYOTROPHY ASSOCIATED WITH TYPE 2 DIABETES MELLITUS (HCC): ICD-10-CM

## 2020-03-08 DIAGNOSIS — F43.21 SITUATIONAL DEPRESSION: ICD-10-CM

## 2020-03-08 DIAGNOSIS — E13.44 DIABETIC AMYOTROPHY ASSOCIATED WITH OTHER SPECIFIED DIABETES MELLITUS (HCC): ICD-10-CM

## 2020-03-09 RX ORDER — DULOXETIN HYDROCHLORIDE 60 MG/1
CAPSULE, DELAYED RELEASE ORAL
Qty: 90 CAPSULE | Refills: 3 | Status: SHIPPED | OUTPATIENT
Start: 2020-03-09 | End: 2021-05-24

## 2020-03-09 RX ORDER — AMITRIPTYLINE HYDROCHLORIDE 75 MG/1
TABLET, FILM COATED ORAL
Qty: 90 TABLET | Refills: 3 | Status: SHIPPED | OUTPATIENT
Start: 2020-03-09 | End: 2021-03-29 | Stop reason: SDUPTHER

## 2020-03-25 ENCOUNTER — APPOINTMENT (OUTPATIENT)
Dept: BONE DENSITY | Facility: HOSPITAL | Age: 75
End: 2020-03-25

## 2020-03-25 ENCOUNTER — HOSPITAL ENCOUNTER (OUTPATIENT)
Dept: MAMMOGRAPHY | Facility: HOSPITAL | Age: 75
End: 2020-03-25

## 2020-05-18 ENCOUNTER — OFFICE VISIT (OUTPATIENT)
Dept: INTERNAL MEDICINE | Facility: CLINIC | Age: 75
End: 2020-05-18

## 2020-05-18 VITALS
DIASTOLIC BLOOD PRESSURE: 78 MMHG | BODY MASS INDEX: 35.46 KG/M2 | OXYGEN SATURATION: 98 % | TEMPERATURE: 97.8 F | HEIGHT: 60 IN | HEART RATE: 100 BPM | WEIGHT: 180.6 LBS | RESPIRATION RATE: 16 BRPM | SYSTOLIC BLOOD PRESSURE: 146 MMHG

## 2020-05-18 DIAGNOSIS — E13.44 DIABETIC AMYOTROPHY ASSOCIATED WITH OTHER SPECIFIED DIABETES MELLITUS (HCC): Primary | ICD-10-CM

## 2020-05-18 DIAGNOSIS — L98.9 SKIN LESION OF RIGHT ARM: ICD-10-CM

## 2020-05-18 DIAGNOSIS — E11.8 TYPE 2 DIABETES MELLITUS WITH COMPLICATION, WITHOUT LONG-TERM CURRENT USE OF INSULIN (HCC): ICD-10-CM

## 2020-05-18 DIAGNOSIS — I10 ESSENTIAL HYPERTENSION: ICD-10-CM

## 2020-05-18 DIAGNOSIS — E78.2 MIXED HYPERLIPIDEMIA: ICD-10-CM

## 2020-05-18 PROCEDURE — 99214 OFFICE O/P EST MOD 30 MIN: CPT | Performed by: INTERNAL MEDICINE

## 2020-05-18 RX ORDER — GLIMEPIRIDE 2 MG/1
TABLET ORAL
Qty: 60 TABLET | Refills: 2 | Status: SHIPPED | OUTPATIENT
Start: 2020-05-18 | End: 2020-08-25

## 2020-05-18 NOTE — PROGRESS NOTES
"      Lydia John is a 74 y.o. female, who presents with a chief complaint of   Chief Complaint   Patient presents with   • Follow-up     3 x month f/u   • Diabetes   • Hyperlipidemia       HPI     HTN - Well controlled, no headache or dizziness.     DM- On metformin er bid and januvia. Denies nausea, vomiting and diarrhea. Brought blood sugar log with her, average 150-200's.  Pt's diet is fairly consistent and has been for the past couple of years.       HLD-Working on eating well, staying active with physical limitations. Walking with walker as below.      Sleep - Significantly improved since initiating melatonin, continues to take ambien night. Afraid to not take ambien for fear of insomnia.      Diabetic amyotrophy-Improved mobility, walking with walker. Reports still staying active. Planning a trip to [place] this year bu jose alberto to cancel bc of Covid.  Pain now much better overall. She does have some \"lightening strike\" episodes but these tend to pass. She hasn't had to take any pain meds recently.  She has been doing some spring cleaning as well.     Preventative-Overdue for mamogram (last in 2016), DEXA (last in 2001) and would like cologuard. Last colonoscopy in 2001.     Right forearm - skin lesion that hasn't healed for several months.       The following portions of the patient's history were reviewed and updated as appropriate: allergies, current medications, past family history, past medical history, past social history, past surgical history and problem list.    Allergies: Patient has no known allergies.    Review of Systems   Constitutional: Negative.    HENT: Negative.    Eyes: Negative.    Respiratory: Negative.    Cardiovascular: Negative.    Gastrointestinal: Negative.    Endocrine: Negative.         Hot flashes   Genitourinary: Negative.    Musculoskeletal: Negative.    Skin: Negative.    Allergic/Immunologic: Negative.    Neurological: Negative.    Hematological: Negative.  "   Psychiatric/Behavioral: Negative.    All other systems reviewed and are negative.            Wt Readings from Last 3 Encounters:   05/18/20 81.9 kg (180 lb 9.6 oz)   02/03/20 80.9 kg (178 lb 6.4 oz)   11/05/19 79.9 kg (176 lb 3.2 oz)     Temp Readings from Last 3 Encounters:   05/18/20 97.8 °F (36.6 °C) (Temporal)   02/03/20 97.9 °F (36.6 °C) (Oral)   01/16/20 98.6 °F (37 °C) (Oral)     BP Readings from Last 3 Encounters:   05/18/20 146/78   02/03/20 130/78   01/16/20 140/76     Pulse Readings from Last 3 Encounters:   05/18/20 100   02/03/20 100   01/16/20 83     Body mass index is 35.27 kg/m².  @LASTSAO2(3)@    Physical Exam   Constitutional: She is oriented to person, place, and time. She appears well-developed and well-nourished. No distress.   HENT:   Head: Normocephalic and atraumatic.   Right Ear: External ear normal.   Left Ear: External ear normal.   Nose: Nose normal.   Mouth/Throat: Oropharynx is clear and moist.   Eyes: Pupils are equal, round, and reactive to light. Conjunctivae and EOM are normal.   Neck: Normal range of motion. Neck supple.   Cardiovascular: Normal rate, regular rhythm, normal heart sounds and intact distal pulses.   Pulmonary/Chest: Effort normal and breath sounds normal. No respiratory distress. She has no wheezes.   Musculoskeletal: Normal range of motion.   Walks with walker     Neurological: She is alert and oriented to person, place, and time.   Skin: Skin is warm and dry.   Psychiatric: She has a normal mood and affect. Her behavior is normal. Judgment and thought content normal.   Nursing note and vitals reviewed.      Results for orders placed or performed in visit on 02/03/20   Comprehensive Metabolic Panel   Result Value Ref Range    Glucose 222 (H) 65 - 99 mg/dL    BUN 21 8 - 27 mg/dL    Creatinine 0.91 0.57 - 1.00 mg/dL    eGFR Non African Am 63 >59 mL/min/1.73    eGFR African Am 72 >59 mL/min/1.73    BUN/Creatinine Ratio 23 12 - 28    Sodium 137 134 - 144 mmol/L     Potassium 4.9 3.5 - 5.2 mmol/L    Chloride 93 (L) 96 - 106 mmol/L    Total CO2 21 20 - 29 mmol/L    Calcium 10.0 8.7 - 10.3 mg/dL    Total Protein 6.4 6.0 - 8.5 g/dL    Albumin 4.1 3.7 - 4.7 g/dL    Globulin 2.3 1.5 - 4.5 g/dL    A/G Ratio 1.8 1.2 - 2.2    Total Bilirubin <0.2 0.0 - 1.2 mg/dL    Alkaline Phosphatase 79 39 - 117 IU/L    AST (SGOT) 22 0 - 40 IU/L    ALT (SGPT) 25 0 - 32 IU/L   T4, Free   Result Value Ref Range    Free T4 1.22 0.82 - 1.77 ng/dL   TSH   Result Value Ref Range    TSH 3.170 0.450 - 4.500 uIU/mL   Lipid Panel With LDL / HDL Ratio   Result Value Ref Range    Total Cholesterol 272 (H) 100 - 199 mg/dL    Triglycerides 302 (H) 0 - 149 mg/dL    HDL Cholesterol 51 >39 mg/dL    VLDL Cholesterol 60 (H) 5 - 40 mg/dL    LDL Cholesterol  161 (H) 0 - 99 mg/dL    LDL/HDL Ratio 3.2 0.0 - 3.2 ratio   Hemoglobin A1c   Result Value Ref Range    Hemoglobin A1C 7.7 (H) 4.8 - 5.6 %   CBC & Differential   Result Value Ref Range    WBC 9.8 3.4 - 10.8 x10E3/uL    RBC 4.47 3.77 - 5.28 x10E6/uL    Hemoglobin 13.3 11.1 - 15.9 g/dL    Hematocrit 40.2 34.0 - 46.6 %    MCV 90 79 - 97 fL    MCH 29.8 26.6 - 33.0 pg    MCHC 33.1 31.5 - 35.7 g/dL    RDW 13.2 11.7 - 15.4 %    Platelets 273 150 - 450 x10E3/uL    Neutrophil Rel % 69 Not Estab. %    Lymphocyte Rel % 19 Not Estab. %    Monocyte Rel % 7 Not Estab. %    Eosinophil Rel % 4 Not Estab. %    Basophil Rel % 0 Not Estab. %    Neutrophils Absolute 6.8 1.4 - 7.0 x10E3/uL    Lymphocytes Absolute 1.8 0.7 - 3.1 x10E3/uL    Monocytes Absolute 0.7 0.1 - 0.9 x10E3/uL    Eosinophils Absolute 0.4 0.0 - 0.4 x10E3/uL    Basophils Absolute 0.0 0.0 - 0.2 x10E3/uL    Immature Granulocyte Rel % 1 Not Estab. %    Immature Grans Absolute 0.1 0.0 - 0.1 x10E3/uL           Lydia was seen today for follow-up, diabetes and hyperlipidemia.    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    Essential hypertension  -     Comprehensive metabolic panel  -     CBC & Differential  -     Hemoglobin A1c  -     TSH  -     T4, free    Skin lesion of right arm  -     Ambulatory Referral to Dermatology    Mixed hyperlipidemia      Cont current meds.  dexa and mammo postponed bc of covid restrictions.      Will discuss statin at next OV as leg weakness is improving.    Outpatient Medications Prior to Visit   Medication Sig Dispense Refill   • amitriptyline (ELAVIL) 75 MG tablet TAKE 1 TABLET BY MOUTH ONCE DAILY NIGHTLY 90 tablet 3   • calcium citrate-vitamin d (CITRACAL) 200-250 MG-UNIT tablet tablet Take 1 tablet by mouth daily.     • docusate sodium (COLACE) 100 MG capsule Take 100 mg by mouth 3 (Three) Times a Day As Needed for constipation.     • DULoxetine (CYMBALTA) 60 MG capsule Take 1 capsule by mouth once daily 90 capsule 3   • glucose blood test strip Use one strip to check glucose three times daily before meals 100 each 11   • ibuprofen (ADVIL,MOTRIN) 200 MG tablet Take 200 mg by mouth.     • JANUVIA 100 MG tablet TAKE 1 TABLET BY MOUTH ONCE DAILY 90 tablet 0   • metFORMIN ER (GLUCOPHAGE-XR) 500 MG 24 hr tablet Take 1 tablet by mouth Daily With Breakfast. 90 tablet 3   • omeprazole (priLOSEC) 20 MG capsule TAKE 1 CAPSULE BY MOUTH ONCE DAILY 90 capsule 3   • oxyCODONE-acetaminophen (PERCOCET) 7.5-325 MG per tablet Take 1 tablet by mouth 2 (Two) Times a Day As Needed for Moderate Pain . 60 tablet 0   • vitamin B-12 (CYANOCOBALAMIN) 100 MCG tablet Take 50 mcg by mouth daily.     • vitamin D (ERGOCALCIFEROL) 1.25 MG (24098 UT) capsule capsule Take 1 capsule by mouth Every 7 (Seven) Days.  13 capsule 4   • zolpidem (AMBIEN) 5 MG tablet Take 1 tablet by mouth At Night As Needed for Sleep. 90 tablet 1   • benzonatate (TESSALON) 200 MG capsule Take 1 capsule by mouth 3 (Three) Times a Day As Needed for Cough. 30 capsule 0   • azithromycin (ZITHROMAX) 250 MG tablet Take 2 tablets the first day, then 1 tablet daily for 4 days. 6 tablet 0     No facility-administered medications prior to visit.      New Medications Ordered This Visit   Medications   • glimepiride (Amaryl) 2 MG tablet     Si po daily x 2 weeks then increase to 2 po daily.     Dispense:  60 tablet     Refill:  2     [unfilled]  Medications Discontinued During This Encounter   Medication Reason   • azithromycin (ZITHROMAX) 250 MG tablet *Therapy completed   • benzonatate (TESSALON) 200 MG capsule *Therapy completed         Return in about 3 months (around 2020) for Recheck, labs.

## 2020-05-19 LAB
ALBUMIN SERPL-MCNC: 4.3 G/DL (ref 3.7–4.7)
ALBUMIN/GLOB SERPL: 1.9 {RATIO} (ref 1.2–2.2)
ALP SERPL-CCNC: 140 IU/L (ref 39–117)
ALT SERPL-CCNC: 32 IU/L (ref 0–32)
AST SERPL-CCNC: 17 IU/L (ref 0–40)
BASOPHILS # BLD AUTO: 0 X10E3/UL (ref 0–0.2)
BASOPHILS NFR BLD AUTO: 0 %
BILIRUB SERPL-MCNC: <0.2 MG/DL (ref 0–1.2)
BUN SERPL-MCNC: 17 MG/DL (ref 8–27)
BUN/CREAT SERPL: 21 (ref 12–28)
CALCIUM SERPL-MCNC: 9.2 MG/DL (ref 8.7–10.3)
CHLORIDE SERPL-SCNC: 98 MMOL/L (ref 96–106)
CO2 SERPL-SCNC: 18 MMOL/L (ref 20–29)
CREAT SERPL-MCNC: 0.8 MG/DL (ref 0.57–1)
EOSINOPHIL # BLD AUTO: 0.3 X10E3/UL (ref 0–0.4)
EOSINOPHIL NFR BLD AUTO: 3 %
ERYTHROCYTE [DISTWIDTH] IN BLOOD BY AUTOMATED COUNT: 13.1 % (ref 11.7–15.4)
GLOBULIN SER CALC-MCNC: 2.3 G/DL (ref 1.5–4.5)
GLUCOSE SERPL-MCNC: 313 MG/DL (ref 65–99)
HBA1C MFR BLD: 8.2 % (ref 4.8–5.6)
HCT VFR BLD AUTO: 39.4 % (ref 34–46.6)
HGB BLD-MCNC: 13.3 G/DL (ref 11.1–15.9)
IMM GRANULOCYTES # BLD AUTO: 0.1 X10E3/UL (ref 0–0.1)
IMM GRANULOCYTES NFR BLD AUTO: 1 %
LYMPHOCYTES # BLD AUTO: 2.2 X10E3/UL (ref 0.7–3.1)
LYMPHOCYTES NFR BLD AUTO: 23 %
MCH RBC QN AUTO: 31.1 PG (ref 26.6–33)
MCHC RBC AUTO-ENTMCNC: 33.8 G/DL (ref 31.5–35.7)
MCV RBC AUTO: 92 FL (ref 79–97)
MONOCYTES # BLD AUTO: 0.6 X10E3/UL (ref 0.1–0.9)
MONOCYTES NFR BLD AUTO: 6 %
NEUTROPHILS # BLD AUTO: 6.7 X10E3/UL (ref 1.4–7)
NEUTROPHILS NFR BLD AUTO: 67 %
PLATELET # BLD AUTO: 242 X10E3/UL (ref 150–450)
POTASSIUM SERPL-SCNC: 4.9 MMOL/L (ref 3.5–5.2)
PROT SERPL-MCNC: 6.6 G/DL (ref 6–8.5)
RBC # BLD AUTO: 4.27 X10E6/UL (ref 3.77–5.28)
SODIUM SERPL-SCNC: 134 MMOL/L (ref 134–144)
T4 FREE SERPL-MCNC: 0.99 NG/DL (ref 0.82–1.77)
TSH SERPL DL<=0.005 MIU/L-ACNC: 3.9 UIU/ML (ref 0.45–4.5)
WBC # BLD AUTO: 9.9 X10E3/UL (ref 3.4–10.8)

## 2020-08-23 DIAGNOSIS — G47.9 SLEEP DIFFICULTIES: ICD-10-CM

## 2020-08-24 DIAGNOSIS — E11.8 TYPE 2 DIABETES MELLITUS WITH COMPLICATION, WITHOUT LONG-TERM CURRENT USE OF INSULIN (HCC): ICD-10-CM

## 2020-08-24 RX ORDER — ZOLPIDEM TARTRATE 5 MG/1
TABLET ORAL
Qty: 90 TABLET | Refills: 0 | Status: SHIPPED | OUTPATIENT
Start: 2020-08-24 | End: 2020-11-09

## 2020-08-25 RX ORDER — GLIMEPIRIDE 2 MG/1
4 TABLET ORAL
Qty: 180 TABLET | Refills: 3 | Status: SHIPPED | OUTPATIENT
Start: 2020-08-25 | End: 2021-03-29 | Stop reason: SDUPTHER

## 2020-08-28 ENCOUNTER — TELEPHONE (OUTPATIENT)
Dept: INTERNAL MEDICINE | Facility: CLINIC | Age: 75
End: 2020-08-28

## 2020-08-28 DIAGNOSIS — E11.69 TYPE 2 DIABETES MELLITUS WITH OTHER SPECIFIED COMPLICATION, UNSPECIFIED WHETHER LONG TERM INSULIN USE (HCC): Primary | ICD-10-CM

## 2020-11-08 DIAGNOSIS — G47.9 SLEEP DIFFICULTIES: ICD-10-CM

## 2020-11-08 DIAGNOSIS — E11.8 TYPE 2 DIABETES MELLITUS WITH COMPLICATION, WITHOUT LONG-TERM CURRENT USE OF INSULIN (HCC): ICD-10-CM

## 2020-11-09 RX ORDER — METFORMIN HYDROCHLORIDE 500 MG/1
TABLET, EXTENDED RELEASE ORAL
Qty: 180 TABLET | Refills: 0 | Status: SHIPPED | OUTPATIENT
Start: 2020-11-09 | End: 2021-02-17

## 2020-11-09 RX ORDER — ZOLPIDEM TARTRATE 5 MG/1
TABLET ORAL
Qty: 90 TABLET | Refills: 0 | Status: SHIPPED | OUTPATIENT
Start: 2020-11-09 | End: 2021-02-17

## 2021-01-11 RX ORDER — SITAGLIPTIN 100 MG/1
TABLET, FILM COATED ORAL
Qty: 90 TABLET | Refills: 0 | Status: SHIPPED | OUTPATIENT
Start: 2021-01-11 | End: 2021-03-29 | Stop reason: SDUPTHER

## 2021-01-26 ENCOUNTER — TELEPHONE (OUTPATIENT)
Dept: INTERNAL MEDICINE | Facility: CLINIC | Age: 76
End: 2021-01-26

## 2021-02-17 DIAGNOSIS — G47.9 SLEEP DIFFICULTIES: ICD-10-CM

## 2021-02-17 DIAGNOSIS — E11.8 TYPE 2 DIABETES MELLITUS WITH COMPLICATION, WITHOUT LONG-TERM CURRENT USE OF INSULIN (HCC): ICD-10-CM

## 2021-02-17 DIAGNOSIS — K29.00 ACUTE GASTRITIS WITHOUT HEMORRHAGE, UNSPECIFIED GASTRITIS TYPE: ICD-10-CM

## 2021-02-17 RX ORDER — OMEPRAZOLE 20 MG/1
CAPSULE, DELAYED RELEASE ORAL
Qty: 90 CAPSULE | Refills: 0 | Status: SHIPPED | OUTPATIENT
Start: 2021-02-17 | End: 2021-03-29 | Stop reason: SDUPTHER

## 2021-02-17 RX ORDER — ZOLPIDEM TARTRATE 5 MG/1
TABLET ORAL
Qty: 90 TABLET | Refills: 0 | Status: SHIPPED | OUTPATIENT
Start: 2021-02-17 | End: 2021-03-29 | Stop reason: SDUPTHER

## 2021-02-17 RX ORDER — METFORMIN HYDROCHLORIDE 500 MG/1
TABLET, EXTENDED RELEASE ORAL
Qty: 180 TABLET | Refills: 0 | Status: SHIPPED | OUTPATIENT
Start: 2021-02-17 | End: 2021-03-29 | Stop reason: SDUPTHER

## 2021-03-03 DIAGNOSIS — G47.9 SLEEP DIFFICULTIES: ICD-10-CM

## 2021-03-03 RX ORDER — ZOLPIDEM TARTRATE 5 MG/1
TABLET ORAL
Qty: 90 TABLET | Refills: 0 | OUTPATIENT
Start: 2021-03-03

## 2021-03-22 ENCOUNTER — TELEPHONE (OUTPATIENT)
Dept: INTERNAL MEDICINE | Facility: CLINIC | Age: 76
End: 2021-03-22

## 2021-03-22 NOTE — TELEPHONE ENCOUNTER
We need to discuss adding insulin if glucoses running that high  Please schedule 30 min appt soon.

## 2021-03-22 NOTE — TELEPHONE ENCOUNTER
----- Message from Maria Esther Saavedra MA sent at 3/22/2021  1:49 PM EDT -----  Regarding: FW: Non-Urgent Medical Question  Contact: [phone]  Appt on 3/29/21.  ----- Message -----  From: Lydia Jhon  Sent: 3/22/2021  11:23 AM EDT  To: [facility] Clinical Pool  Subject: Non-Urgent Medical Question                      Hello,  My blood sugars have been running in the 200-400 range. I’ve been taking my medicines and nothing has changed with my diet. I’m making an appointment with Dr Davis, but wanted to see if I should do anything differently in the mean time. If you need to call, please call my daughter Agnes at [phone]. Thank you,  Lydia

## 2021-03-23 NOTE — TELEPHONE ENCOUNTER
Patient has already scheduled appointment when her daughter can bring her in.  Working to try and extend to 30 minute.

## 2021-03-23 NOTE — TELEPHONE ENCOUNTER
Will someone please schedule this patient a 30 minute appt with Dr. Davis?   Thank you very much.

## 2021-03-29 ENCOUNTER — OFFICE VISIT (OUTPATIENT)
Dept: INTERNAL MEDICINE | Facility: CLINIC | Age: 76
End: 2021-03-29

## 2021-03-29 VITALS
DIASTOLIC BLOOD PRESSURE: 78 MMHG | BODY MASS INDEX: 35.81 KG/M2 | OXYGEN SATURATION: 98 % | SYSTOLIC BLOOD PRESSURE: 120 MMHG | HEART RATE: 101 BPM | RESPIRATION RATE: 16 BRPM | TEMPERATURE: 97 F | HEIGHT: 60 IN | WEIGHT: 182.4 LBS

## 2021-03-29 DIAGNOSIS — Z00.00 MEDICARE ANNUAL WELLNESS VISIT, SUBSEQUENT: Primary | ICD-10-CM

## 2021-03-29 DIAGNOSIS — K29.00 ACUTE GASTRITIS WITHOUT HEMORRHAGE, UNSPECIFIED GASTRITIS TYPE: ICD-10-CM

## 2021-03-29 DIAGNOSIS — E78.2 MIXED HYPERLIPIDEMIA: ICD-10-CM

## 2021-03-29 DIAGNOSIS — I10 ESSENTIAL HYPERTENSION: ICD-10-CM

## 2021-03-29 DIAGNOSIS — E11.8 TYPE 2 DIABETES MELLITUS WITH COMPLICATION, WITHOUT LONG-TERM CURRENT USE OF INSULIN (HCC): ICD-10-CM

## 2021-03-29 DIAGNOSIS — G47.9 SLEEP DIFFICULTIES: ICD-10-CM

## 2021-03-29 DIAGNOSIS — E13.44 DIABETIC AMYOTROPHY ASSOCIATED WITH OTHER SPECIFIED DIABETES MELLITUS (HCC): ICD-10-CM

## 2021-03-29 DIAGNOSIS — E11.69 TYPE 2 DIABETES MELLITUS WITH OTHER SPECIFIED COMPLICATION, UNSPECIFIED WHETHER LONG TERM INSULIN USE (HCC): ICD-10-CM

## 2021-03-29 DIAGNOSIS — Z78.0 POST-MENOPAUSAL: ICD-10-CM

## 2021-03-29 DIAGNOSIS — Z12.31 BREAST CANCER SCREENING BY MAMMOGRAM: ICD-10-CM

## 2021-03-29 PROCEDURE — 99214 OFFICE O/P EST MOD 30 MIN: CPT | Performed by: INTERNAL MEDICINE

## 2021-03-29 PROCEDURE — G0439 PPPS, SUBSEQ VISIT: HCPCS | Performed by: INTERNAL MEDICINE

## 2021-03-29 RX ORDER — METFORMIN HYDROCHLORIDE 500 MG/1
500 TABLET, EXTENDED RELEASE ORAL 2 TIMES DAILY
Qty: 180 TABLET | Refills: 3 | Status: SHIPPED | OUTPATIENT
Start: 2021-03-29 | End: 2021-04-28

## 2021-03-29 RX ORDER — OMEPRAZOLE 20 MG/1
20 CAPSULE, DELAYED RELEASE ORAL DAILY
Qty: 90 CAPSULE | Refills: 3 | Status: SHIPPED | OUTPATIENT
Start: 2021-03-29 | End: 2022-06-28

## 2021-03-29 RX ORDER — ZOLPIDEM TARTRATE 5 MG/1
5 TABLET ORAL NIGHTLY PRN
Qty: 90 TABLET | Refills: 1 | Status: SHIPPED | OUTPATIENT
Start: 2021-03-29 | End: 2021-06-29 | Stop reason: SDUPTHER

## 2021-03-29 RX ORDER — GLIMEPIRIDE 4 MG/1
4 TABLET ORAL
Qty: 90 TABLET | Refills: 3 | Status: SHIPPED | OUTPATIENT
Start: 2021-03-29 | End: 2021-09-14 | Stop reason: SDUPTHER

## 2021-03-29 RX ORDER — AMITRIPTYLINE HYDROCHLORIDE 75 MG/1
75 TABLET, FILM COATED ORAL
Qty: 90 TABLET | Refills: 3 | Status: SHIPPED | OUTPATIENT
Start: 2021-03-29 | End: 2022-02-22

## 2021-03-29 NOTE — PATIENT INSTRUCTIONS
Medicare Wellness  Personal Prevention Plan of Service     Date of Office Visit:  2021  Encounter Provider:  Selena Davis MD  Place of Service:  [facility]  Patient Name: Lydia John  :  1945    As part of the Medicare Wellness portion of your visit today, we are providing you with this personalized preventive plan of services (PPPS). This plan is based upon recommendations of the [place] Preventive Services Task Force (USPSTF) and the Advisory Committee on Immunization Practices (ACIP).    This lists the preventive care services that should be considered, and provides dates of when you are due. Items listed as completed are up-to-date and do not require any further intervention.    Health Maintenance   Topic Date Due   • TDAP/TD VACCINES (1 - Tdap) Never done   • ZOSTER VACCINE (2 of 2) 2010   • DIABETIC FOOT EXAM  2017   • DXA SCAN  2018   • MAMMOGRAM  2018   • URINE MICROALBUMIN  2020   • DIABETIC EYE EXAM  2020   • HEMOGLOBIN A1C  2020   • LIPID PANEL  2021   • ANNUAL WELLNESS VISIT  2022   • COLOGUARD  2023   • HEPATITIS C SCREENING  Completed   • COVID-19 Vaccine  Completed   • INFLUENZA VACCINE  Completed   • Pneumococcal Vaccine 65+  Completed   • MENINGOCOCCAL VACCINE  Aged Out       Orders Placed This Encounter   Procedures   • Mammo Screening Bilateral With CAD     Standing Status:   Future     Standing Expiration Date:   3/30/2022   • DEXA Bone Density Axial     Standing Status:   Future     Standing Expiration Date:   3/30/2022   • Comprehensive Metabolic Panel   • TSH   • T4, Free   • Microalbumin / Creatinine Urine Ratio - Urine, Clean Catch   • Lipid Panel With LDL / HDL Ratio   • Hemoglobin A1c   • CBC & Differential     Order Specific Question:   Manual Differential     Answer:   No       No follow-ups on file.

## 2021-03-29 NOTE — PROGRESS NOTES
"      Lydia John is a 75 y.o. female, who presents with a chief complaint of   Chief Complaint   Patient presents with   • Follow-up   • Diabetes           HPI     Pt here for f/u with her daughter.  She has had both covid shots. Her mom passed away in October at age 96 from a pancreatic mass.     HTN - Well controlled, no headache or dizziness.     DM- On metformin er bid and januvia. Denies nausea, vomiting and diarrhea. Pt's  had shoulder surgery and she was staying with her daughters.  Her diet was different and she ran out of her januvia.  Glucoses were as high as the 300-400's.  Pt back on her medication and back to her regular diet and now most glucoses 120-160's.     She has been having some dizziness spells.  They think it may be related to her hyperglycemia.  She will stand up and see rings and has to sit back down.  No episodes when pt lying.  Sx usually when pt gets up to go to the bathroom.  LE edema at baseline.       HLD-Working on eating well, staying active with physical limitations. Walking with walker as below.      Sleep - Significantly improved since initiating melatonin, continues to take ambien night. Afraid to not take ambien for fear of insomnia.      Diabetic amyotrophy-Improved mobility, walking with walker. Reports still staying active. Planning a trip to [place] this year vishnu abrams to cancel bc of Covid.  Pain now much better overall. She does have some \"lightening strike\" episodes but these tend to pass. She hasn't had to take any pain meds recently.  She has been doing some spring cleaning as well.     Preventative-Overdue for mamogram (last in 2016), DEXA (last in 2001) and would like cologuard. Last colonoscopy in 2001.      Pt saw derm for a spot on her right forearm.  The biopsy showed melanoma.  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      Eye exam scheduled for Thursday.  The following portions of the patient's " history were reviewed and updated as appropriate: allergies, current medications, past family history, past medical history, past social history, past surgical history and problem list.    Allergies: Patient has no known allergies.    Review of Systems   Constitutional: Negative.    HENT: Negative.    Eyes: Negative.    Respiratory: Negative.    Cardiovascular: Negative.    Gastrointestinal: Negative.    Endocrine: Negative.    Genitourinary: Negative.    Musculoskeletal: Negative.    Skin: Negative.    Allergic/Immunologic: Negative.    Neurological: Negative.    Hematological: Negative.    Psychiatric/Behavioral: Negative.    All other systems reviewed and are negative.            Wt Readings from Last 3 Encounters:   03/29/21 82.7 kg (182 lb 6.4 oz)   05/18/20 81.9 kg (180 lb 9.6 oz)   02/03/20 80.9 kg (178 lb 6.4 oz)     Temp Readings from Last 3 Encounters:   03/29/21 97 °F (36.1 °C) (Temporal)   05/18/20 97.8 °F (36.6 °C) (Temporal)   02/03/20 97.9 °F (36.6 °C) (Oral)     BP Readings from Last 3 Encounters:   03/29/21 120/78   05/18/20 146/78   02/03/20 130/78     Pulse Readings from Last 3 Encounters:   03/29/21 101   05/18/20 100   02/03/20 100     Body mass index is 35.62 kg/m².  SpO2 Readings from Last 3 Encounters:   03/29/21 98%   05/18/20 98%   02/03/20 97%          Physical Exam  Vitals and nursing note reviewed.   Constitutional:       General: She is not in acute distress.     Appearance: She is well-developed.   HENT:      Head: Normocephalic and atraumatic.      Right Ear: External ear normal.      Left Ear: External ear normal.      Nose: Nose normal.   Eyes:      Conjunctiva/sclera: Conjunctivae normal.      Pupils: Pupils are equal, round, and reactive to light.   Cardiovascular:      Rate and Rhythm: Normal rate and regular rhythm.      Heart sounds: Normal heart sounds.   Pulmonary:      Effort: Pulmonary effort is normal. No respiratory distress.      Breath sounds: Normal breath sounds. No  wheezing.   Musculoskeletal:         General: Normal range of motion.      Cervical back: Normal range of motion and neck supple.      Comments: Walks with walker   Skin:     General: Skin is warm and dry.   Neurological:      Mental Status: She is alert and oriented to person, place, and time.   Psychiatric:         Behavior: Behavior normal.         Thought Content: Thought content normal.         Judgment: Judgment normal.         Results for orders placed or performed in visit on 05/18/20   Comprehensive metabolic panel    Specimen: Blood   Result Value Ref Range    Glucose 313 (H) 65 - 99 mg/dL    BUN 17 8 - 27 mg/dL    Creatinine 0.80 0.57 - 1.00 mg/dL    eGFR Non African Am 73 >59 mL/min/1.73    eGFR African Am 85 >59 mL/min/1.73    BUN/Creatinine Ratio 21 12 - 28    Sodium 134 134 - 144 mmol/L    Potassium 4.9 3.5 - 5.2 mmol/L    Chloride 98 96 - 106 mmol/L    Total CO2 18 (L) 20 - 29 mmol/L    Calcium 9.2 8.7 - 10.3 mg/dL    Total Protein 6.6 6.0 - 8.5 g/dL    Albumin 4.3 3.7 - 4.7 g/dL    Globulin 2.3 1.5 - 4.5 g/dL    A/G Ratio 1.9 1.2 - 2.2    Total Bilirubin <0.2 0.0 - 1.2 mg/dL    Alkaline Phosphatase 140 (H) 39 - 117 IU/L    AST (SGOT) 17 0 - 40 IU/L    ALT (SGPT) 32 0 - 32 IU/L   Hemoglobin A1c    Specimen: Blood   Result Value Ref Range    Hemoglobin A1C 8.2 (H) 4.8 - 5.6 %   TSH    Specimen: Blood   Result Value Ref Range    TSH 3.900 0.450 - 4.500 uIU/mL   T4, free    Specimen: Blood   Result Value Ref Range    Free T4 0.99 0.82 - 1.77 ng/dL   CBC & Differential    Specimen: Blood   Result Value Ref Range    WBC 9.9 3.4 - 10.8 x10E3/uL    RBC 4.27 3.77 - 5.28 x10E6/uL    Hemoglobin 13.3 11.1 - 15.9 g/dL    Hematocrit 39.4 34.0 - 46.6 %    MCV 92 79 - 97 fL    MCH 31.1 26.6 - 33.0 pg    MCHC 33.8 31.5 - 35.7 g/dL    RDW 13.1 11.7 - 15.4 %    Platelets 242 150 - 450 x10E3/uL    Neutrophil Rel % 67 Not Estab. %    Lymphocyte Rel % 23 Not Estab. %    Monocyte Rel % 6 Not Estab. %    Eosinophil Rel % 3  Not Estab. %    Basophil Rel % 0 Not Estab. %    Neutrophils Absolute 6.7 1.4 - 7.0 x10E3/uL    Lymphocytes Absolute 2.2 0.7 - 3.1 x10E3/uL    Monocytes Absolute 0.6 0.1 - 0.9 x10E3/uL    Eosinophils Absolute 0.3 0.0 - 0.4 x10E3/uL    Basophils Absolute 0.0 0.0 - 0.2 x10E3/uL    Immature Granulocyte Rel % 1 Not Estab. %    Immature Grans Absolute 0.1 0.0 - 0.1 x10E3/uL     Result Review :                  Assessment and Plan    Diagnoses and all orders for this visit:    1. Medicare annual wellness visit, subsequent (Primary)    2. Type 2 diabetes mellitus with complication, without long-term current use of insulin (CMS/[facility])  -     Comprehensive Metabolic Panel  -     CBC & Differential  -     TSH  -     T4, Free  -     Microalbumin / Creatinine Urine Ratio - Urine, Clean Catch  -     Lipid Panel With LDL / HDL Ratio  -     Hemoglobin A1c  -     metFORMIN ER (GLUCOPHAGE-XR) 500 MG 24 hr tablet; Take 1 tablet by mouth 2 (Two) Times a Day.  Dispense: 180 tablet; Refill: 3  -     SITagliptin (Januvia) 100 MG tablet; Take 1 tablet by mouth Daily.  Dispense: 90 tablet; Refill: 3  -     glimepiride (AMARYL) 4 MG tablet; Take 1 tablet by mouth Every Morning Before Breakfast.  Dispense: 90 tablet; Refill: 3    3. Post-menopausal  -     DEXA Bone Density Axial; Future    4. Breast cancer screening by mammogram  -     Mammo Screening Bilateral With CAD; Future    5. Sleep difficulties  -     zolpidem (AMBIEN) 5 MG tablet; Take 1 tablet by mouth At Night As Needed for Sleep.  Dispense: 90 tablet; Refill: 1    6. Essential hypertension    7. Mixed hyperlipidemia    8. 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  Dispense: 90 tablet; Refill: 3    9. Type 2 diabetes mellitus with other specified complication, unspecified whether long term insulin use (CMS/[facility])    10. Acute gastritis without hemorrhage,  unspecified gastritis type  -     omeprazole (priLOSEC) 20 MG capsule; Take 1 capsule by mouth Daily.  Dispense: 90 capsule; Refill: 3       Diabetes has been uncontrolled but pt now getting back to baseline home situation and normal diet.  Will check now and then f/u in 3 mo.     Patient's Body mass index is 35.62 kg/m². BMI is above normal parameters. Recommendations include: exercise counseling and nutrition counseling.             Outpatient Medications Prior to Visit   Medication Sig Dispense Refill   • calcium citrate-vitamin d (CITRACAL) 200-250 MG-UNIT tablet tablet Take 1 tablet by mouth daily.     • DULoxetine (CYMBALTA) 60 MG capsule Take 1 capsule by mouth once daily 90 capsule 3   • glucose blood test strip Check blood sugar once daily 100 each 0   • ibuprofen (ADVIL,MOTRIN) 200 MG tablet Take 200 mg by mouth.     • vitamin B-12 (CYANOCOBALAMIN) 100 MCG tablet Take 50 mcg by mouth daily.     • vitamin D (ERGOCALCIFEROL) 1.25 MG (49122 UT) capsule capsule Take 1 capsule by mouth Every 7 (Seven) Days. 13 capsule 4   • amitriptyline (ELAVIL) 75 MG tablet TAKE 1 TABLET BY MOUTH ONCE DAILY NIGHTLY 90 tablet 3   • glimepiride (AMARYL) 2 MG tablet Take 2 tablets by mouth Every Morning Before Breakfast. TAKE 1 TABLET BY MOUTH ONCE DAILY FOR 14 DAYS, THEN TAKE 2  TABS ONCE DAILY THEREAFTER 180 tablet 3   • Januvia 100 MG tablet Take 1 tablet by mouth once daily 90 tablet 0   • metFORMIN ER (GLUCOPHAGE-XR) 500 MG 24 hr tablet Take 1 tablet by mouth twice daily 180 tablet 0   • omeprazole (priLOSEC) 20 MG capsule Take 1 capsule by mouth once daily 90 capsule 0   • zolpidem (AMBIEN) 5 MG tablet TAKE 1 TABLET BY MOUTH NIGHTLY AS NEEDED FOR SLEEP 90 tablet 0   • docusate sodium (COLACE) 100 MG capsule Take 100 mg by mouth 3 (Three) Times a Day As Needed for constipation.     • oxyCODONE-acetaminophen (PERCOCET) 7.5-325 MG per tablet Take 1 tablet by mouth 2 (Two) Times a Day As Needed for Moderate Pain . 60 tablet 0      No facility-administered medications prior to visit.     New Medications Ordered This Visit   Medications   • zolpidem (AMBIEN) 5 MG tablet     Sig: Take 1 tablet by mouth At Night As Needed for Sleep.     Dispense:  90 tablet     Refill:  1   • omeprazole (priLOSEC) 20 MG capsule     Sig: Take 1 capsule by mouth Daily.     Dispense:  90 capsule     Refill:  3   • metFORMIN ER (GLUCOPHAGE-XR) 500 MG 24 hr tablet     Sig: Take 1 tablet by mouth 2 (Two) Times a Day.     Dispense:  180 tablet     Refill:  3   • SITagliptin (Januvia) 100 MG tablet     Sig: Take 1 tablet by mouth Daily.     Dispense:  90 tablet     Refill:  3   • glimepiride (AMARYL) 4 MG tablet     Sig: Take 1 tablet by mouth Every Morning Before Breakfast.     Dispense:  90 tablet     Refill:  3   • amitriptyline (ELAVIL) 75 MG tablet     Sig: Take 1 tablet by mouth every night at bedtime.     Dispense:  90 tablet     Refill:  3     [unfilled]  Medications Discontinued During This Encounter   Medication Reason   • oxyCODONE-acetaminophen (PERCOCET) 7.5-325 MG per tablet *Therapy completed   • amitriptyline (ELAVIL) 75 MG tablet Reorder   • glimepiride (AMARYL) 2 MG tablet Reorder   • Januvia 100 MG tablet Reorder   • zolpidem (AMBIEN) 5 MG tablet Reorder   • omeprazole (priLOSEC) 20 MG capsule Reorder   • metFORMIN ER (GLUCOPHAGE-XR) 500 MG 24 hr tablet Reorder         Return in about 3 months (around 6/29/2021) for labs, Recheck.    Patient was given instructions and counseling regarding her condition or for health maintenance advice. Please see specific information pulled into the AVS if appropriate.

## 2021-03-29 NOTE — PROGRESS NOTES
The ABCs of the Annual Wellness Visit  Subsequent Medicare Wellness Visit    Chief Complaint   Patient presents with   • Follow-up   • Diabetes       Subjective   History of Present Illness:  Lydia John is a 75 y.o. female who presents for a Subsequent Medicare Wellness Visit.    HEALTH RISK ASSESSMENT    Recent Hospitalizations:  No hospitalization(s) within the last year.    Current Medical Providers:  Patient Care Team:  Selena Davis MD as PCP - General (Internal Medicine & Pediatrics)  Nikolay Vazquez MD as Consulting Physician (Neurology)    Smoking Status:  Social History     Tobacco Use   Smoking Status Never Smoker   Smokeless Tobacco Never Used       Alcohol Consumption:  Social History     Substance and Sexual Activity   Alcohol Use No       Depression Screen:   PHQ-2/PHQ-9 Depression Screening 3/29/2021   Little interest or pleasure in doing things 0   Feeling down, depressed, or hopeless 0   Trouble falling or staying asleep, or sleeping too much -   Feeling tired or having little energy -   Poor appetite or overeating -   Feeling bad about yourself - or that you are a failure or have let yourself or your family down -   Trouble concentrating on things, such as reading the newspaper or watching television -   Moving or speaking so slowly that other people could have noticed. Or the opposite - being so fidgety or restless that you have been moving around a lot more than usual -   Thoughts that you would be better off dead, or of hurting yourself in some way -   Total Score 0   If you checked off any problems, how difficult have these problems made it for you to do your work, take care of things at home, or get along with other people? -       Fall Risk Screen:  STEADI Fall Risk Assessment was completed, and patient is at HIGH risk for falls. Assessment completed on:3/29/2021    Health Habits and Functional and Cognitive Screening:  Functional & Cognitive Status 3/29/2021   Do you have  difficulty preparing food and eating? Yes   Do you have difficulty bathing yourself, getting dressed or grooming yourself? Yes   Do you have difficulty using the toilet? Yes   Do you have difficulty moving around from place to place? Yes   Do you have trouble with steps or getting out of a bed or a chair? Yes   Current Diet Well Balanced Diet   Dental Exam Up to date   Eye Exam Not up to date   Exercise (times per week) 2 times per week   Current Exercises Include Other   Current Exercise Activities Include -   Do you need help using the phone?  No   Are you deaf or do you have serious difficulty hearing?  No   Do you need help with transportation? Yes   Do you need help shopping? Yes   Do you need help preparing meals?  Yes   Do you need help with housework?  Yes   Do you need help with laundry? No   Do you need help taking your medications? No   Do you need help managing money? No   Do you ever drive or ride in a car without wearing a seat belt? No   Have you felt unusual stress, anger or loneliness in the last month? No   Who do you live with? Spouse   If you need help, do you have trouble finding someone available to you? No   Have you been bothered in the last four weeks by sexual problems? No   Do you have difficulty concentrating, remembering or making decisions? No         Does the patient have evidence of cognitive impairment? No    Asprin use counseling:Does not need ASA (and currently is not on it)    Age-appropriate Screening Schedule:  Refer to the list below for future screening recommendations based on patient's age, sex and/or medical conditions. Orders for these recommended tests are listed in the plan section. The patient has been provided with a written plan.    Health Maintenance   Topic Date Due   • TDAP/TD VACCINES (1 - Tdap) Never done   • ZOSTER VACCINE (2 of 2) 04/08/2010   • DIABETIC FOOT EXAM  07/01/2017   • DXA SCAN  05/13/2018   • MAMMOGRAM  05/27/2018   • URINE MICROALBUMIN  04/30/2020    • DIABETIC EYE EXAM  07/25/2020   • HEMOGLOBIN A1C  11/18/2020   • LIPID PANEL  02/03/2021   • INFLUENZA VACCINE  Completed          The following portions of the patient's history were reviewed and updated as appropriate: allergies, current medications, past family history, past medical history, past social history, past surgical history and problem list.    Outpatient Medications Prior to Visit   Medication Sig Dispense Refill   • calcium citrate-vitamin d (CITRACAL) 200-250 MG-UNIT tablet tablet Take 1 tablet by mouth daily.     • DULoxetine (CYMBALTA) 60 MG capsule Take 1 capsule by mouth once daily 90 capsule 3   • glucose blood test strip Check blood sugar once daily 100 each 0   • ibuprofen (ADVIL,MOTRIN) 200 MG tablet Take 200 mg by mouth.     • vitamin B-12 (CYANOCOBALAMIN) 100 MCG tablet Take 50 mcg by mouth daily.     • vitamin D (ERGOCALCIFEROL) 1.25 MG (41461 UT) capsule capsule Take 1 capsule by mouth Every 7 (Seven) Days. 13 capsule 4   • amitriptyline (ELAVIL) 75 MG tablet TAKE 1 TABLET BY MOUTH ONCE DAILY NIGHTLY 90 tablet 3   • glimepiride (AMARYL) 2 MG tablet Take 2 tablets by mouth Every Morning Before Breakfast. TAKE 1 TABLET BY MOUTH ONCE DAILY FOR 14 DAYS, THEN TAKE 2  TABS ONCE DAILY THEREAFTER 180 tablet 3   • Januvia 100 MG tablet Take 1 tablet by mouth once daily 90 tablet 0   • metFORMIN ER (GLUCOPHAGE-XR) 500 MG 24 hr tablet Take 1 tablet by mouth twice daily 180 tablet 0   • omeprazole (priLOSEC) 20 MG capsule Take 1 capsule by mouth once daily 90 capsule 0   • zolpidem (AMBIEN) 5 MG tablet TAKE 1 TABLET BY MOUTH NIGHTLY AS NEEDED FOR SLEEP 90 tablet 0   • docusate sodium (COLACE) 100 MG capsule Take 100 mg by mouth 3 (Three) Times a Day As Needed for constipation.     • oxyCODONE-acetaminophen (PERCOCET) 7.5-325 MG per tablet Take 1 tablet by mouth 2 (Two) Times a Day As Needed for Moderate Pain . 60 tablet 0     No facility-administered medications prior to visit.       Patient  "Active Problem List   Diagnosis   • Peripheral polyneuropathy   • Type 2 diabetes mellitus (CMS/HCC)   • Left foot pain   • Post-menopausal   • Breast cancer screening   • Diabetic amyotrophy (CMS/HCC)   • Proximal limb muscle weakness   • Hyponatremia   • Situational depression   • Mixed hyperlipidemia   • Vitamin D deficiency   • Paraparesis (CMS/[facility])   • Weakness of lower extremity       Advanced Care Planning:  ACP discussion was held with the patient during this visit. Patient does not have an advance directive, information provided.    Review of Systems    Compared to one year ago, the patient feels her physical health is the same.  Compared to one year ago, the patient feels her mental health is the same.    Reviewed chart for potential of high risk medication in the elderly: yes  Reviewed chart for potential of harmful drug interactions in the elderly:yes    Objective         Vitals:    03/29/21 1355   BP: 120/78   BP Location: Left arm   Patient Position: Sitting   Cuff Size: Large Adult   Pulse: 101   Resp: 16   Temp: 97 °F (36.1 °C)   TempSrc: Temporal   SpO2: 98%   Weight: 82.7 kg (182 lb 6.4 oz)   Height: 152.4 cm (60\")       Body mass index is 35.62 kg/m².  Discussed the patient's BMI with her. The BMI is above average; no BMI management plan is appropriate..    Physical Exam          Assessment/Plan   Medicare Risks and Personalized Health Plan  CMS Preventative Services Quick Reference  Advance Directive Discussion  Breast Cancer/Mammogram Screening  Colon Cancer Screening  Fall Risk  Immunizations Discussed/Encouraged (specific immunizations; adacel Tdap and Shingrix )  Obesity/Overweight   Osteoprorosis Risk  Polypharmacy    The above risks/problems have been discussed with the patient.  Pertinent information has been shared with the patient in the After Visit Summary.  Follow up plans and orders are seen below in the Assessment/Plan Section.    Diagnoses and all orders for this visit:    1. " Medicare annual wellness visit, subsequent (Primary)    2. Type 2 diabetes mellitus with complication, without long-term current use of insulin (CMS/[facility])  -     Comprehensive Metabolic Panel  -     CBC & Differential  -     TSH  -     T4, Free  -     Microalbumin / Creatinine Urine Ratio - Urine, Clean Catch  -     Lipid Panel With LDL / HDL Ratio  -     Hemoglobin A1c  -     metFORMIN ER (GLUCOPHAGE-XR) 500 MG 24 hr tablet; Take 1 tablet by mouth 2 (Two) Times a Day.  Dispense: 180 tablet; Refill: 3  -     SITagliptin (Januvia) 100 MG tablet; Take 1 tablet by mouth Daily.  Dispense: 90 tablet; Refill: 3  -     glimepiride (AMARYL) 4 MG tablet; Take 1 tablet by mouth Every Morning Before Breakfast.  Dispense: 90 tablet; Refill: 3    3. Post-menopausal  -     DEXA Bone Density Axial; Future    4. Breast cancer screening by mammogram  -     Mammo Screening Bilateral With CAD; Future    5. Sleep difficulties  -     zolpidem (AMBIEN) 5 MG tablet; Take 1 tablet by mouth At Night As Needed for Sleep.  Dispense: 90 tablet; Refill: 1    6. Essential hypertension    7. Mixed hyperlipidemia    8. 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  Dispense: 90 tablet; Refill: 3    9. Type 2 diabetes mellitus with other specified complication, unspecified whether long term insulin use (CMS/[facility])    10. Acute gastritis without hemorrhage, unspecified gastritis type  -     omeprazole (priLOSEC) 20 MG capsule; Take 1 capsule by mouth Daily.  Dispense: 90 capsule; Refill: 3      Follow Up:  Return in about 3 months (around 6/29/2021) for labs, Recheck.     An After Visit Summary and PPPS were given to the patient.

## 2021-03-30 LAB
ALBUMIN SERPL-MCNC: 4.2 G/DL (ref 3.5–5.2)
ALBUMIN/GLOB SERPL: 1.9 G/DL
ALP SERPL-CCNC: 113 U/L (ref 39–117)
ALT SERPL-CCNC: 24 U/L (ref 1–33)
AST SERPL-CCNC: 20 U/L (ref 1–32)
BASOPHILS # BLD AUTO: 0.03 10*3/MM3 (ref 0–0.2)
BASOPHILS NFR BLD AUTO: 0.3 % (ref 0–1.5)
BILIRUB SERPL-MCNC: 0.2 MG/DL (ref 0–1.2)
BUN SERPL-MCNC: 17 MG/DL (ref 8–23)
BUN/CREAT SERPL: 17.2 (ref 7–25)
CALCIUM SERPL-MCNC: 9.4 MG/DL (ref 8.6–10.5)
CHLORIDE SERPL-SCNC: 98 MMOL/L (ref 98–107)
CHOLEST SERPL-MCNC: 248 MG/DL (ref 0–200)
CO2 SERPL-SCNC: 24.4 MMOL/L (ref 22–29)
CREAT SERPL-MCNC: 0.99 MG/DL (ref 0.57–1)
EOSINOPHIL # BLD AUTO: 0.33 10*3/MM3 (ref 0–0.4)
EOSINOPHIL NFR BLD AUTO: 3.6 % (ref 0.3–6.2)
ERYTHROCYTE [DISTWIDTH] IN BLOOD BY AUTOMATED COUNT: 13.9 % (ref 12.3–15.4)
GLOBULIN SER CALC-MCNC: 2.2 GM/DL
GLUCOSE SERPL-MCNC: 273 MG/DL (ref 65–99)
HBA1C MFR BLD: 9 % (ref 4.8–5.6)
HCT VFR BLD AUTO: 39.9 % (ref 34–46.6)
HDLC SERPL-MCNC: 47 MG/DL (ref 40–60)
HGB BLD-MCNC: 13.3 G/DL (ref 12–15.9)
IMM GRANULOCYTES # BLD AUTO: 0.05 10*3/MM3 (ref 0–0.05)
IMM GRANULOCYTES NFR BLD AUTO: 0.6 % (ref 0–0.5)
LDLC SERPL CALC-MCNC: 162 MG/DL (ref 0–100)
LDLC/HDLC SERPL: 3.39 {RATIO}
LYMPHOCYTES # BLD AUTO: 2.31 10*3/MM3 (ref 0.7–3.1)
LYMPHOCYTES NFR BLD AUTO: 25.5 % (ref 19.6–45.3)
MCH RBC QN AUTO: 29 PG (ref 26.6–33)
MCHC RBC AUTO-ENTMCNC: 33.3 G/DL (ref 31.5–35.7)
MCV RBC AUTO: 86.9 FL (ref 79–97)
MONOCYTES # BLD AUTO: 0.48 10*3/MM3 (ref 0.1–0.9)
MONOCYTES NFR BLD AUTO: 5.3 % (ref 5–12)
NEUTROPHILS # BLD AUTO: 5.86 10*3/MM3 (ref 1.7–7)
NEUTROPHILS NFR BLD AUTO: 64.7 % (ref 42.7–76)
NRBC BLD AUTO-RTO: 0 /100 WBC (ref 0–0.2)
PLATELET # BLD AUTO: 276 10*3/MM3 (ref 140–450)
POTASSIUM SERPL-SCNC: 5 MMOL/L (ref 3.5–5.2)
PROT SERPL-MCNC: 6.4 G/DL (ref 6–8.5)
RBC # BLD AUTO: 4.59 10*6/MM3 (ref 3.77–5.28)
SODIUM SERPL-SCNC: 134 MMOL/L (ref 136–145)
T4 FREE SERPL-MCNC: 1.07 NG/DL (ref 0.93–1.7)
TRIGL SERPL-MCNC: 209 MG/DL (ref 0–150)
TSH SERPL DL<=0.005 MIU/L-ACNC: 3.4 UIU/ML (ref 0.27–4.2)
UNABLE TO VOID: NORMAL
VLDLC SERPL CALC-MCNC: 39 MG/DL (ref 5–40)
WBC # BLD AUTO: 9.06 10*3/MM3 (ref 3.4–10.8)

## 2021-04-01 ENCOUNTER — TRANSCRIBE ORDERS (OUTPATIENT)
Dept: ADMINISTRATIVE | Facility: HOSPITAL | Age: 76
End: 2021-04-01

## 2021-04-01 DIAGNOSIS — Z12.31 SCREENING MAMMOGRAM, ENCOUNTER FOR: Primary | ICD-10-CM

## 2021-04-12 DIAGNOSIS — E11.69 TYPE 2 DIABETES MELLITUS WITH OTHER SPECIFIED COMPLICATION, UNSPECIFIED WHETHER LONG TERM INSULIN USE (HCC): ICD-10-CM

## 2021-04-27 DIAGNOSIS — E55.9 VITAMIN D DEFICIENCY: ICD-10-CM

## 2021-04-27 DIAGNOSIS — E11.8 TYPE 2 DIABETES MELLITUS WITH COMPLICATION, WITHOUT LONG-TERM CURRENT USE OF INSULIN (HCC): ICD-10-CM

## 2021-04-28 RX ORDER — METFORMIN HYDROCHLORIDE 500 MG/1
TABLET, EXTENDED RELEASE ORAL
Qty: 180 TABLET | Refills: 1 | Status: SHIPPED | OUTPATIENT
Start: 2021-04-28 | End: 2021-06-29 | Stop reason: SDUPTHER

## 2021-04-28 RX ORDER — ERGOCALCIFEROL 1.25 MG/1
CAPSULE ORAL
Qty: 13 CAPSULE | Refills: 1 | Status: SHIPPED | OUTPATIENT
Start: 2021-04-28 | End: 2021-06-29 | Stop reason: SDUPTHER

## 2021-05-24 DIAGNOSIS — E11.44 DIABETIC AMYOTROPHY ASSOCIATED WITH TYPE 2 DIABETES MELLITUS (HCC): ICD-10-CM

## 2021-05-24 DIAGNOSIS — F43.21 SITUATIONAL DEPRESSION: ICD-10-CM

## 2021-05-24 RX ORDER — DULOXETIN HYDROCHLORIDE 60 MG/1
CAPSULE, DELAYED RELEASE ORAL
Qty: 90 CAPSULE | Refills: 3 | Status: SHIPPED | OUTPATIENT
Start: 2021-05-24 | End: 2022-05-11

## 2021-06-29 ENCOUNTER — OFFICE VISIT (OUTPATIENT)
Dept: INTERNAL MEDICINE | Facility: CLINIC | Age: 76
End: 2021-06-29

## 2021-06-29 VITALS
OXYGEN SATURATION: 98 % | HEART RATE: 99 BPM | HEIGHT: 60 IN | RESPIRATION RATE: 19 BRPM | BODY MASS INDEX: 35.57 KG/M2 | DIASTOLIC BLOOD PRESSURE: 76 MMHG | SYSTOLIC BLOOD PRESSURE: 123 MMHG | TEMPERATURE: 97.5 F | WEIGHT: 181.2 LBS

## 2021-06-29 DIAGNOSIS — E78.2 MIXED HYPERLIPIDEMIA: ICD-10-CM

## 2021-06-29 DIAGNOSIS — R60.0 BILATERAL LEG EDEMA: ICD-10-CM

## 2021-06-29 DIAGNOSIS — E11.44 DIABETIC AMYOTROPHY ASSOCIATED WITH TYPE 2 DIABETES MELLITUS (HCC): ICD-10-CM

## 2021-06-29 DIAGNOSIS — G47.9 SLEEP DIFFICULTIES: ICD-10-CM

## 2021-06-29 DIAGNOSIS — E11.69 TYPE 2 DIABETES MELLITUS WITH OTHER SPECIFIED COMPLICATION, UNSPECIFIED WHETHER LONG TERM INSULIN USE (HCC): ICD-10-CM

## 2021-06-29 DIAGNOSIS — I10 ESSENTIAL HYPERTENSION: ICD-10-CM

## 2021-06-29 DIAGNOSIS — E55.9 VITAMIN D DEFICIENCY: ICD-10-CM

## 2021-06-29 DIAGNOSIS — E11.8 TYPE 2 DIABETES MELLITUS WITH COMPLICATION, WITHOUT LONG-TERM CURRENT USE OF INSULIN (HCC): Primary | ICD-10-CM

## 2021-06-29 LAB
BILIRUB BLD-MCNC: NEGATIVE MG/DL
CLARITY, POC: CLEAR
COLOR UR: YELLOW
GLUCOSE UR STRIP-MCNC: NEGATIVE MG/DL
HBA1C MFR BLD: 8.7 %
KETONES UR QL: NEGATIVE
LEUKOCYTE EST, POC: ABNORMAL
NITRITE UR-MCNC: NEGATIVE MG/ML
PH UR: 6.5 [PH] (ref 5–8)
POC CREATININE URINE: ABNORMAL
POC MICROALBUMIN URINE: ABNORMAL
PROT UR STRIP-MCNC: NEGATIVE MG/DL
RBC # UR STRIP: NEGATIVE /UL
SP GR UR: 1.03 (ref 1–1.03)
UROBILINOGEN UR QL: NORMAL

## 2021-06-29 PROCEDURE — 81003 URINALYSIS AUTO W/O SCOPE: CPT | Performed by: INTERNAL MEDICINE

## 2021-06-29 PROCEDURE — 99215 OFFICE O/P EST HI 40 MIN: CPT | Performed by: INTERNAL MEDICINE

## 2021-06-29 PROCEDURE — 83036 HEMOGLOBIN GLYCOSYLATED A1C: CPT | Performed by: INTERNAL MEDICINE

## 2021-06-29 PROCEDURE — 82044 UR ALBUMIN SEMIQUANTITATIVE: CPT | Performed by: INTERNAL MEDICINE

## 2021-06-29 RX ORDER — FUROSEMIDE 20 MG/1
20 TABLET ORAL DAILY PRN
Qty: 90 TABLET | Refills: 0 | Status: SHIPPED | OUTPATIENT
Start: 2021-06-29 | End: 2023-02-28

## 2021-06-29 RX ORDER — BLOOD SUGAR DIAGNOSTIC
1 STRIP MISCELLANEOUS 2 TIMES DAILY
Qty: 200 EACH | Refills: 3 | Status: SHIPPED | OUTPATIENT
Start: 2021-06-29 | End: 2021-07-09 | Stop reason: SDUPTHER

## 2021-06-29 RX ORDER — KETOCONAZOLE 20 MG/G
CREAM TOPICAL
COMMUNITY
Start: 2021-06-07

## 2021-06-29 RX ORDER — METFORMIN HYDROCHLORIDE 500 MG/1
500 TABLET, EXTENDED RELEASE ORAL 2 TIMES DAILY
Qty: 180 TABLET | Refills: 3 | Status: SHIPPED | OUTPATIENT
Start: 2021-06-29 | End: 2022-08-16

## 2021-06-29 RX ORDER — ERGOCALCIFEROL 1.25 MG/1
50000 CAPSULE ORAL WEEKLY
Qty: 13 CAPSULE | Refills: 3 | Status: SHIPPED | OUTPATIENT
Start: 2021-06-29 | End: 2022-08-19

## 2021-06-29 RX ORDER — ROSUVASTATIN CALCIUM 5 MG/1
5 TABLET, COATED ORAL DAILY
Qty: 90 TABLET | Refills: 3 | Status: SHIPPED | OUTPATIENT
Start: 2021-06-29 | End: 2022-08-19

## 2021-06-29 RX ORDER — ZOLPIDEM TARTRATE 5 MG/1
5 TABLET ORAL NIGHTLY PRN
Qty: 90 TABLET | Refills: 1 | Status: SHIPPED | OUTPATIENT
Start: 2021-06-29 | End: 2022-02-14

## 2021-06-29 NOTE — PROGRESS NOTES
"      Lydia John is a 75 y.o. female, who presents with a chief complaint of   Chief Complaint   Patient presents with   • Diabetes           HPI   Pt here with her daughter for follow up    She saw derm and had a melanoma removed from her right arm.  She also had a basal cell carcinoma removed from her left ankle.  She is s/p mohs and skin grafting.  She is using vaseline and changing dressings daily per derm.    She has had LE edema off and on.  Sx worse with standing and improve with elevation.  She is unsure if bp up when swelling worse.    HTN - Well controlled without meds. Pt was having dizziness but this is better at this time.     DM- On metformin er bid and januvia. Denies nausea, vomiting and diarrhea.  Pt on her medication daily and back to her regular diet and now most glucoses 110-150's.      HLD-Working on eating well, staying active with physical limitations. Walking with walker as below.      Sleep - Significantly improved since initiating melatonin, continues to take ambien night. Afraid to not take ambien for fear of insomnia.      Diabetic amyotrophy-Improved mobility, walking with walker. Reports still staying active. Planning a trip to [place] this year bu [employer] to cancel bc of Covid.  Pain now much better overall. She does have some \"lightening strike\" episodes but these tend to pass. She hasn't had to take any pain meds recently.  She has been doing some spring cleaning as well.     Preventative-Overdue for mamogram (last in 2016), DEXA (last in 2001) and would like cologuard. Last colonoscopy in 2001.     The following portions of the patient's history were reviewed and updated as appropriate: allergies, current medications, past family history, past medical history, past social history, past surgical history and problem list.    Allergies: Patient has no known allergies.    Review of Systems   Constitutional: Negative.    HENT: Negative.    Eyes: Negative.    Respiratory: Negative.  "   Cardiovascular: Positive for leg swelling.   Gastrointestinal: Negative.    Endocrine: Negative.    Genitourinary: Negative.    Musculoskeletal: Negative.    Skin: Negative.    Allergic/Immunologic: Negative.    Neurological: Negative.    Hematological: Negative.    Psychiatric/Behavioral: Negative.    All other systems reviewed and are negative.            Wt Readings from Last 3 Encounters:   06/29/21 82.2 kg (181 lb 3.2 oz)   03/29/21 82.7 kg (182 lb 6.4 oz)   05/18/20 81.9 kg (180 lb 9.6 oz)     Temp Readings from Last 3 Encounters:   06/29/21 97.5 °F (36.4 °C) (Temporal)   03/29/21 97 °F (36.1 °C) (Temporal)   05/18/20 97.8 °F (36.6 °C) (Temporal)     BP Readings from Last 3 Encounters:   06/29/21 123/76   03/29/21 120/78   05/18/20 146/78     Pulse Readings from Last 3 Encounters:   06/29/21 99   03/29/21 101   05/18/20 100     Body mass index is 35.39 kg/m².  SpO2 Readings from Last 3 Encounters:   06/29/21 98%   03/29/21 98%   05/18/20 98%          Physical Exam  Vitals and nursing note reviewed.   Constitutional:       General: She is not in acute distress.     Appearance: She is well-developed.   HENT:      Head: Normocephalic and atraumatic.      Right Ear: External ear normal.      Left Ear: External ear normal.      Nose: Nose normal.   Eyes:      Conjunctiva/sclera: Conjunctivae normal.      Pupils: Pupils are equal, round, and reactive to light.   Cardiovascular:      Rate and Rhythm: Normal rate and regular rhythm.      Heart sounds: Normal heart sounds.   Pulmonary:      Effort: Pulmonary effort is normal. No respiratory distress.      Breath sounds: Normal breath sounds. No wheezing.   Musculoskeletal:      Cervical back: Normal range of motion and neck supple.      Comments: Walks with walker  Mild bilat LE edema   Skin:     General: Skin is warm.      Comments: LLE with healing wound   Neurological:      Mental Status: She is alert and oriented to person, place, and time.   Psychiatric:          Behavior: Behavior normal.         Thought Content: Thought content normal.         Judgment: Judgment normal.         Results for orders placed or performed in visit on 06/29/21   POC Glycosylated Hemoglobin (Hb A1C)    Specimen: Blood   Result Value Ref Range    Hemoglobin A1C 8.7 %   POCT urinalysis dipstick, automated    Specimen: Urine   Result Value Ref Range    Color Yellow Yellow, Straw, Dark Yellow, Funmi    Clarity, UA Clear Clear    Specific Gravity  1.030 1.005 - 1.030    pH, Urine 6.5 5.0 - 8.0    Leukocytes Trace (A) Negative    Nitrite, UA Negative Negative    Protein, POC Negative Negative mg/dL    Glucose, UA Negative Negative, 1000 mg/dL (3+) mg/dL    Ketones, UA Negative Negative    Urobilinogen, UA Normal Normal    Bilirubin Negative Negative    Blood, UA Negative Negative   POCT microalbumin    Specimen: Urine   Result Value Ref Range    Microalbumin, Urine 10 mg/L     Creatinine, Urine 50 mg/dL      Result Review :                  Assessment and Plan    Diagnoses and all orders for this visit:    1. Type 2 diabetes mellitus with complication, without long-term current use of insulin (CMS/[facility]) (Primary) - discussed lowering carb intake with goal of about 50 carbs per meal.  If a1c not coming down will need to consider initiating insulin.  -     glucose blood (Accu-Chek Mima Plus) test strip; 1 each by Other route 2 (Two) Times a Day. Use as instructed  Dispense: 200 each; Refill: 3  -     metFORMIN ER (GLUCOPHAGE-XR) 500 MG 24 hr tablet; Take 1 tablet by mouth 2 (Two) Times a Day.  Dispense: 180 tablet; Refill: 3  -     POC Glycosylated Hemoglobin (Hb A1C)  -     POCT urinalysis dipstick, automated  -     POCT microalbumin    2. Diabetic amyotrophy associated with type 2 diabetes mellitus (CMS/[facility])    3. Mixed hyperlipidemia  -     rosuvastatin (Crestor) 5 MG tablet; Take 1 tablet by mouth Daily.  Dispense: 90 tablet; Refill: 3    4. Essential hypertension  -     POCT urinalysis dipstick,  automated    5. Type 2 diabetes mellitus with other specified complication, unspecified whether long term insulin use (CMS/[facility])    6. Vitamin D deficiency  -     vitamin D (ERGOCALCIFEROL) 1.25 MG (57650 UT) capsule capsule; Take 1 capsule by mouth 1 (One) Time Per Week.  Dispense: 13 capsule; Refill: 3    7. Sleep difficulties  -     zolpidem (AMBIEN) 5 MG tablet; Take 1 tablet by mouth At Night As Needed for Sleep.  Dispense: 90 tablet; Refill: 1    8. Bilateral leg edema  -     furosemide (Lasix) 20 MG tablet; Take 1 tablet by mouth Daily As Needed (leg swelling).  Dispense: 90 tablet; Refill: 0        The 10-year ASCVD risk score (Johnniefelisa VYAS Jr., et al., 2013) is: 32.3%    Values used to calculate the score:      Age: 75 years      Sex: Female      Is Non- : No      Diabetic: Yes      Tobacco smoker: No      Systolic Blood Pressure: 123 mmHg      Is BP treated: Yes      HDL Cholesterol: 47 mg/dL      Total Cholesterol: 248 mg/dL       I spent 43 minutes caring for Lydia on this date of service. This time includes time spent by me in the following activities:preparing for the visit, reviewing tests, performing a medically appropriate examination and/or evaluation , counseling and educating the patient/family/caregiver, ordering medications, tests, or procedures and documenting information in the medical record      Outpatient Medications Prior to Visit   Medication Sig Dispense Refill   • amitriptyline (ELAVIL) 75 MG tablet Take 1 tablet by mouth every night at bedtime. 90 tablet 3   • calcium citrate-vitamin d (CITRACAL) 200-250 MG-UNIT tablet tablet Take 1 tablet by mouth daily.     • DULoxetine (CYMBALTA) 60 MG capsule Take 1 capsule by mouth once daily 90 capsule 3   • glimepiride (AMARYL) 4 MG tablet Take 1 tablet by mouth Every Morning Before Breakfast. 90 tablet 3   • glucose blood test strip Check blood sugar once daily 100 each 0   • ibuprofen (ADVIL,MOTRIN) 200 MG tablet Take 200 mg  by mouth.     • omeprazole (priLOSEC) 20 MG capsule Take 1 capsule by mouth Daily. 90 capsule 3   • SITagliptin (Januvia) 100 MG tablet Take 1 tablet by mouth Daily. 90 tablet 3   • vitamin B-12 (CYANOCOBALAMIN) 100 MCG tablet Take 50 mcg by mouth daily.     • metFORMIN ER (GLUCOPHAGE-XR) 500 MG 24 hr tablet Take 1 tablet by mouth twice daily 180 tablet 1   • vitamin D (ERGOCALCIFEROL) 1.25 MG (36612 UT) capsule capsule Take 1 capsule by mouth once a week 13 capsule 1   • zolpidem (AMBIEN) 5 MG tablet Take 1 tablet by mouth At Night As Needed for Sleep. 90 tablet 1   • ketoconazole (NIZORAL) 2 % cream APPLY CREAM TOPICALLY TO CORNERS OF MOUTH TWICE DAILY AS NEEDED     • docusate sodium (COLACE) 100 MG capsule Take 100 mg by mouth 3 (Three) Times a Day As Needed for constipation.       No facility-administered medications prior to visit.     New Medications Ordered This Visit   Medications   • rosuvastatin (Crestor) 5 MG tablet     Sig: Take 1 tablet by mouth Daily.     Dispense:  90 tablet     Refill:  3   • glucose blood (Accu-Chek Mima Plus) test strip     Si each by Other route 2 (Two) Times a Day. Use as instructed     Dispense:  200 each     Refill:  3   • vitamin D (ERGOCALCIFEROL) 1.25 MG (24111 UT) capsule capsule     Sig: Take 1 capsule by mouth 1 (One) Time Per Week.     Dispense:  13 capsule     Refill:  3   • zolpidem (AMBIEN) 5 MG tablet     Sig: Take 1 tablet by mouth At Night As Needed for Sleep.     Dispense:  90 tablet     Refill:  1   • metFORMIN ER (GLUCOPHAGE-XR) 500 MG 24 hr tablet     Sig: Take 1 tablet by mouth 2 (Two) Times a Day.     Dispense:  180 tablet     Refill:  3   • furosemide (Lasix) 20 MG tablet     Sig: Take 1 tablet by mouth Daily As Needed (leg swelling).     Dispense:  90 tablet     Refill:  0     [unfilled]  Medications Discontinued During This Encounter   Medication Reason   • docusate sodium (COLACE) 100 MG capsule *Therapy completed   • zolpidem (AMBIEN) 5 MG  tablet Reorder   • metFORMIN ER (GLUCOPHAGE-XR) 500 MG 24 hr tablet Reorder   • vitamin D (ERGOCALCIFEROL) 1.25 MG (91788 UT) capsule capsule Reorder         Return in about 3 months (around 9/29/2021) for Recheck, labs.    Patient was given instructions and counseling regarding her condition or for health maintenance advice. Please see specific information pulled into the AVS if appropriate.

## 2021-07-02 ENCOUNTER — TELEPHONE (OUTPATIENT)
Dept: INTERNAL MEDICINE | Facility: CLINIC | Age: 76
End: 2021-07-02

## 2021-07-02 NOTE — TELEPHONE ENCOUNTER
PHARMACY CALLED STATING THAT THE PATIENT'S INSURANCE IS DENYING HER TEST STRIPS UNLESS THE TESTING IS SWITCHED TO ONCE A DAY. THE PHARMACY STATED THAT THEY HAD FAXED OVER SOME PAPERWORK THAT CAN BE FILLED OUT IN ORDER TO APPEAL THAT REQUEST AND CONTINUE WITH THE CURRENT TESTING PLAN.    PHARMACY WOULD LIKE CONFIRMATION THAT THIS PAPERWORK WAS RECEIVED     PLEASE ADVISE  [phone]

## 2021-07-07 ENCOUNTER — PATIENT MESSAGE (OUTPATIENT)
Dept: INTERNAL MEDICINE | Facility: CLINIC | Age: 76
End: 2021-07-07

## 2021-07-07 DIAGNOSIS — E11.8 TYPE 2 DIABETES MELLITUS WITH COMPLICATION, WITHOUT LONG-TERM CURRENT USE OF INSULIN (HCC): ICD-10-CM

## 2021-07-09 NOTE — TELEPHONE ENCOUNTER
From: Lydia John  To: Selena Davis MD  Sent: 7/7/2021 8:45 AM EDT  Subject: Prescription Question    Hello-my test strips have been denied. Can you assist?

## 2021-07-12 RX ORDER — BLOOD SUGAR DIAGNOSTIC
1 STRIP MISCELLANEOUS DAILY
Qty: 100 EACH | Refills: 3 | Status: SHIPPED | OUTPATIENT
Start: 2021-07-12 | End: 2021-09-14 | Stop reason: SDUPTHER

## 2021-09-14 ENCOUNTER — OFFICE VISIT (OUTPATIENT)
Dept: INTERNAL MEDICINE | Facility: CLINIC | Age: 76
End: 2021-09-14

## 2021-09-14 VITALS
OXYGEN SATURATION: 97 % | BODY MASS INDEX: 34.87 KG/M2 | TEMPERATURE: 97.1 F | HEIGHT: 60 IN | DIASTOLIC BLOOD PRESSURE: 76 MMHG | SYSTOLIC BLOOD PRESSURE: 130 MMHG | HEART RATE: 87 BPM | WEIGHT: 177.6 LBS | RESPIRATION RATE: 20 BRPM

## 2021-09-14 DIAGNOSIS — E11.8 TYPE 2 DIABETES MELLITUS WITH COMPLICATION, WITHOUT LONG-TERM CURRENT USE OF INSULIN (HCC): ICD-10-CM

## 2021-09-14 DIAGNOSIS — E78.2 MIXED HYPERLIPIDEMIA: Primary | ICD-10-CM

## 2021-09-14 DIAGNOSIS — E11.44 DIABETIC AMYOTROPHY ASSOCIATED WITH TYPE 2 DIABETES MELLITUS (HCC): ICD-10-CM

## 2021-09-14 DIAGNOSIS — E11.69 TYPE 2 DIABETES MELLITUS WITH OTHER SPECIFIED COMPLICATION, UNSPECIFIED WHETHER LONG TERM INSULIN USE (HCC): ICD-10-CM

## 2021-09-14 DIAGNOSIS — I10 ESSENTIAL HYPERTENSION: ICD-10-CM

## 2021-09-14 LAB
POC CREATININE URINE: NORMAL
POC MICROALBUMIN URINE: NORMAL

## 2021-09-14 PROCEDURE — 99214 OFFICE O/P EST MOD 30 MIN: CPT | Performed by: INTERNAL MEDICINE

## 2021-09-14 PROCEDURE — 82044 UR ALBUMIN SEMIQUANTITATIVE: CPT | Performed by: INTERNAL MEDICINE

## 2021-09-14 RX ORDER — GLIMEPIRIDE 4 MG/1
4 TABLET ORAL
Qty: 90 TABLET | Refills: 3 | Status: SHIPPED | OUTPATIENT
Start: 2021-09-14 | End: 2022-10-05 | Stop reason: SDUPTHER

## 2021-09-14 RX ORDER — BLOOD SUGAR DIAGNOSTIC
1 STRIP MISCELLANEOUS 2 TIMES DAILY
Qty: 200 EACH | Refills: 3 | Status: SHIPPED | OUTPATIENT
Start: 2021-09-14 | End: 2022-08-16 | Stop reason: SDUPTHER

## 2021-09-14 NOTE — PROGRESS NOTES
"      Lydia John is a 76 y.o. female, who presents with a chief complaint of   Chief Complaint   Patient presents with   • Diabetes   • Hyperlipidemia           HPI   Pt here with her daughter for follow up     She saw derm and had a melanoma removed from her right arm.  She also had a basal cell carcinoma removed from her left ankle.  This spot is still healing.  She is s/p mohs and skin grafting.  She is using vaseline and changing dressings daily per derm.    HTN - Well controlled without meds. Pt was having dizziness but this is better at this time.     DM- On metformin er bid and januvia. Denies nausea, vomiting and diarrhea.  Pt has been cutting out carbs.  She has even cut out her pecan helen each morning.      HLD-Working on eating well, staying active with physical limitations. Tolerating crestor well.  No cramps or myalgias     Sleep - Significantly improved since initiating melatonin, continues to take ambien night. Afraid to not take ambien for fear of insomnia.      Diabetic amyotrophy-Improved mobility, walking with walker. Reports still staying active. Planning a trip to [place] this year bu jose alberto to cancel bc  of Covid.  Pain now much better overall. She does have some \"lightening strike\" episodes but these tend to pass. She hasn't had to take any pain meds recently.  She has been doing some spring cleaning as well.     Preventative-Overdue for mamogram (last in 2016), DEXA (last in 2001) and would like cologuard. Last colonoscopy in 2001.     The following portions of the patient's history were reviewed and updated as appropriate: allergies, current medications, past family history, past medical history, past social history, past surgical history and problem list.    Allergies: Patient has no known allergies.    Review of Systems   Constitutional: Negative.    HENT: Negative.    Eyes: Negative.    Respiratory: Negative.    Cardiovascular: Negative.    Gastrointestinal: Negative.    Endocrine: " Negative.    Genitourinary: Negative.    Musculoskeletal: Negative.    Skin: Negative.    Allergic/Immunologic: Negative.    Neurological: Negative.    Hematological: Negative.    Psychiatric/Behavioral: Negative.    All other systems reviewed and are negative.            Wt Readings from Last 3 Encounters:   09/14/21 80.6 kg (177 lb 9.6 oz)   06/29/21 82.2 kg (181 lb 3.2 oz)   03/29/21 82.7 kg (182 lb 6.4 oz)     Temp Readings from Last 3 Encounters:   09/14/21 97.1 °F (36.2 °C) (Temporal)   06/29/21 97.5 °F (36.4 °C) (Temporal)   03/29/21 97 °F (36.1 °C) (Temporal)     BP Readings from Last 3 Encounters:   09/14/21 130/76   06/29/21 123/76   03/29/21 120/78     Pulse Readings from Last 3 Encounters:   09/14/21 87   06/29/21 99   03/29/21 101     Body mass index is 34.69 kg/m².  SpO2 Readings from Last 3 Encounters:   09/14/21 97%   06/29/21 98%   03/29/21 98%          Physical Exam  Vitals and nursing note reviewed.   Constitutional:       General: She is not in acute distress.     Appearance: She is well-developed.   HENT:      Head: Normocephalic and atraumatic.      Right Ear: External ear normal.      Left Ear: External ear normal.      Nose: Nose normal.   Eyes:      Conjunctiva/sclera: Conjunctivae normal.      Pupils: Pupils are equal, round, and reactive to light.   Cardiovascular:      Rate and Rhythm: Normal rate and regular rhythm.      Heart sounds: Normal heart sounds.   Pulmonary:      Effort: Pulmonary effort is normal. No respiratory distress.      Breath sounds: Normal breath sounds. No wheezing.   Musculoskeletal:      Cervical back: Normal range of motion and neck supple.      Right lower leg: No edema.      Comments: Walks with walker  Trace LE e   Skin:     General: Skin is warm and dry.   Neurological:      Mental Status: She is alert and oriented to person, place, and time.   Psychiatric:         Behavior: Behavior normal.         Thought Content: Thought content normal.         Judgment:  Judgment normal.         Results for orders placed or performed in visit on 06/29/21   POC Glycosylated Hemoglobin (Hb A1C)    Specimen: Blood   Result Value Ref Range    Hemoglobin A1C 8.7 %   POCT urinalysis dipstick, automated    Specimen: Urine   Result Value Ref Range    Color Yellow Yellow, Straw, Dark Yellow, Funmi    Clarity, UA Clear Clear    Specific Gravity  1.030 1.005 - 1.030    pH, Urine 6.5 5.0 - 8.0    Leukocytes Trace (A) Negative    Nitrite, UA Negative Negative    Protein, POC Negative Negative mg/dL    Glucose, UA Negative Negative, 1000 mg/dL (3+) mg/dL    Ketones, UA Negative Negative    Urobilinogen, UA Normal Normal    Bilirubin Negative Negative    Blood, UA Negative Negative   POCT microalbumin    Specimen: Urine   Result Value Ref Range    Microalbumin, Urine 10 mg/L     Creatinine, Urine 50 mg/dL      Result Review :                  Assessment and Plan    Diagnoses and all orders for this visit:    1. Mixed hyperlipidemia (Primary)  -     Comprehensive Metabolic Panel  -     Lipid Panel With LDL / HDL Ratio    2. Type 2 diabetes mellitus with complication, without long-term current use of insulin (CMS/[facility])  -     glucose blood (Accu-Chek Mima Plus) test strip; 1 each by Other route 2 (two) times a day. Use as instructed  Dispense: 200 each; Refill: 3  -     Comprehensive Metabolic Panel  -     CBC & Differential  -     Lipid Panel With LDL / HDL Ratio  -     Hemoglobin A1c  -     glimepiride (AMARYL) 4 MG tablet; Take 1 tablet by mouth Every Morning Before Breakfast.  Dispense: 90 tablet; Refill: 3    3. Type 2 diabetes mellitus with other specified complication, unspecified whether long term insulin use (CMS/[facility])    4. Essential hypertension  -     Comprehensive Metabolic Panel  -     CBC & Differential    5. Diabetic amyotrophy associated with type 2 diabetes mellitus (CMS/[facility])       Check labs today and then adjust meds as indicated.            Outpatient Medications Prior to Visit    Medication Sig Dispense Refill   • amitriptyline (ELAVIL) 75 MG tablet Take 1 tablet by mouth every night at bedtime. 90 tablet 3   • calcium citrate-vitamin d (CITRACAL) 200-250 MG-UNIT tablet tablet Take 1 tablet by mouth daily.     • DULoxetine (CYMBALTA) 60 MG capsule Take 1 capsule by mouth once daily 90 capsule 3   • furosemide (Lasix) 20 MG tablet Take 1 tablet by mouth Daily As Needed (leg swelling). 90 tablet 0   • glucose blood test strip Check blood sugar once daily 100 each 0   • ibuprofen (ADVIL,MOTRIN) 200 MG tablet Take 200 mg by mouth.     • ketoconazole (NIZORAL) 2 % cream APPLY CREAM TOPICALLY TO CORNERS OF MOUTH TWICE DAILY AS NEEDED     • metFORMIN ER (GLUCOPHAGE-XR) 500 MG 24 hr tablet Take 1 tablet by mouth 2 (Two) Times a Day. 180 tablet 3   • omeprazole (priLOSEC) 20 MG capsule Take 1 capsule by mouth Daily. 90 capsule 3   • rosuvastatin (Crestor) 5 MG tablet Take 1 tablet by mouth Daily. 90 tablet 3   • SITagliptin (Januvia) 100 MG tablet Take 1 tablet by mouth Daily. 90 tablet 3   • vitamin B-12 (CYANOCOBALAMIN) 100 MCG tablet Take 50 mcg by mouth daily.     • vitamin D (ERGOCALCIFEROL) 1.25 MG (66306 UT) capsule capsule Take 1 capsule by mouth 1 (One) Time Per Week. 13 capsule 3   • zolpidem (AMBIEN) 5 MG tablet Take 1 tablet by mouth At Night As Needed for Sleep. 90 tablet 1   • glimepiride (AMARYL) 4 MG tablet Take 1 tablet by mouth Every Morning Before Breakfast. 90 tablet 3   • glucose blood (Accu-Chek Mima Plus) test strip 1 each by Other route Daily. Use as instructed 100 each 3     No facility-administered medications prior to visit.     New Medications Ordered This Visit   Medications   • glucose blood (Accu-Chek Mima Plus) test strip     Si each by Other route 2 (two) times a day. Use as instructed     Dispense:  200 each     Refill:  3   • glimepiride (AMARYL) 4 MG tablet     Sig: Take 1 tablet by mouth Every Morning Before Breakfast.     Dispense:  90 tablet     Refill:   3     [unfilled]  Medications Discontinued During This Encounter   Medication Reason   • glucose blood (Accu-Chek Mima Plus) test strip Reorder   • glimepiride (AMARYL) 4 MG tablet Reorder         Return in about 3 months (around 12/14/2021) for Recheck, labs.    Patient was given instructions and counseling regarding her condition or for health maintenance advice. Please see specific information pulled into the AVS if appropriate.

## 2021-09-15 LAB
ALBUMIN SERPL-MCNC: 4.6 G/DL (ref 3.5–5.2)
ALBUMIN/GLOB SERPL: 2.2 G/DL
ALP SERPL-CCNC: 114 U/L (ref 39–117)
ALT SERPL-CCNC: 15 U/L (ref 1–33)
AST SERPL-CCNC: 17 U/L (ref 1–32)
BASOPHILS # BLD AUTO: 0.03 10*3/MM3 (ref 0–0.2)
BASOPHILS NFR BLD AUTO: 0.3 % (ref 0–1.5)
BILIRUB SERPL-MCNC: 0.2 MG/DL (ref 0–1.2)
BUN SERPL-MCNC: 21 MG/DL (ref 8–23)
BUN/CREAT SERPL: 24.4 (ref 7–25)
CALCIUM SERPL-MCNC: 9.6 MG/DL (ref 8.6–10.5)
CHLORIDE SERPL-SCNC: 98 MMOL/L (ref 98–107)
CHOLEST SERPL-MCNC: 150 MG/DL (ref 0–200)
CO2 SERPL-SCNC: 23.4 MMOL/L (ref 22–29)
CREAT SERPL-MCNC: 0.86 MG/DL (ref 0.57–1)
EOSINOPHIL # BLD AUTO: 0.39 10*3/MM3 (ref 0–0.4)
EOSINOPHIL NFR BLD AUTO: 3.5 % (ref 0.3–6.2)
ERYTHROCYTE [DISTWIDTH] IN BLOOD BY AUTOMATED COUNT: 14.5 % (ref 12.3–15.4)
GLOBULIN SER CALC-MCNC: 2.1 GM/DL
GLUCOSE SERPL-MCNC: 145 MG/DL (ref 65–99)
HBA1C MFR BLD: 7.7 % (ref 4.8–5.6)
HCT VFR BLD AUTO: 39.3 % (ref 34–46.6)
HDLC SERPL-MCNC: 46 MG/DL (ref 40–60)
HGB BLD-MCNC: 13.1 G/DL (ref 12–15.9)
IMM GRANULOCYTES # BLD AUTO: 0.05 10*3/MM3 (ref 0–0.05)
IMM GRANULOCYTES NFR BLD AUTO: 0.4 % (ref 0–0.5)
LDLC SERPL CALC-MCNC: 74 MG/DL (ref 0–100)
LDLC/HDLC SERPL: 1.48 {RATIO}
LYMPHOCYTES # BLD AUTO: 2.8 10*3/MM3 (ref 0.7–3.1)
LYMPHOCYTES NFR BLD AUTO: 25.2 % (ref 19.6–45.3)
MCH RBC QN AUTO: 29 PG (ref 26.6–33)
MCHC RBC AUTO-ENTMCNC: 33.3 G/DL (ref 31.5–35.7)
MCV RBC AUTO: 87.1 FL (ref 79–97)
MONOCYTES # BLD AUTO: 0.64 10*3/MM3 (ref 0.1–0.9)
MONOCYTES NFR BLD AUTO: 5.8 % (ref 5–12)
NEUTROPHILS # BLD AUTO: 7.21 10*3/MM3 (ref 1.7–7)
NEUTROPHILS NFR BLD AUTO: 64.8 % (ref 42.7–76)
NRBC BLD AUTO-RTO: 0 /100 WBC (ref 0–0.2)
PLATELET # BLD AUTO: 241 10*3/MM3 (ref 140–450)
POTASSIUM SERPL-SCNC: 4.9 MMOL/L (ref 3.5–5.2)
PROT SERPL-MCNC: 6.7 G/DL (ref 6–8.5)
RBC # BLD AUTO: 4.51 10*6/MM3 (ref 3.77–5.28)
SODIUM SERPL-SCNC: 136 MMOL/L (ref 136–145)
TRIGL SERPL-MCNC: 180 MG/DL (ref 0–150)
VLDLC SERPL CALC-MCNC: 30 MG/DL (ref 5–40)
WBC # BLD AUTO: 11.12 10*3/MM3 (ref 3.4–10.8)

## 2021-09-20 ENCOUNTER — TELEPHONE (OUTPATIENT)
Dept: INTERNAL MEDICINE | Facility: CLINIC | Age: 76
End: 2021-09-20

## 2021-09-20 NOTE — TELEPHONE ENCOUNTER
Unable to speak with patient, left voicemail.  ----- Message from Selena Davis MD sent at 9/17/2021  4:43 PM EDT -----  Call pt about labs.  A1c improved to 7.7.  cholesterol much better  248-> 150!!

## 2022-02-11 DIAGNOSIS — G47.9 SLEEP DIFFICULTIES: ICD-10-CM

## 2022-02-14 RX ORDER — ZOLPIDEM TARTRATE 5 MG/1
5 TABLET ORAL NIGHTLY PRN
Qty: 90 TABLET | Refills: 0 | Status: SHIPPED | OUTPATIENT
Start: 2022-02-14 | End: 2022-05-11

## 2022-02-19 DIAGNOSIS — E13.44: ICD-10-CM

## 2022-02-21 NOTE — TELEPHONE ENCOUNTER
Rx Refill Note  Requested Prescriptions     Pending Prescriptions Disp Refills    amitriptyline (ELAVIL) 75 MG tablet [Pharmacy Med Name: Amitriptyline HCl 75 MG Oral Tablet] 90 tablet 0     Sig: TAKE 1 TABLET BY MOUTH EVERY DAY AT BEDTIME      Last office visit with prescribing clinician: 9/14/2021      Next office visit with prescribing clinician: 2/23/2022            Mireya Moe MA  02/21/22, 09:30 EST

## 2022-02-22 DIAGNOSIS — E13.44: ICD-10-CM

## 2022-02-22 RX ORDER — AMITRIPTYLINE HYDROCHLORIDE 75 MG/1
TABLET, FILM COATED ORAL
Qty: 90 TABLET | Refills: 3 | Status: SHIPPED | OUTPATIENT
Start: 2022-02-22 | End: 2023-02-28

## 2022-02-23 ENCOUNTER — OFFICE VISIT (OUTPATIENT)
Dept: INTERNAL MEDICINE | Facility: CLINIC | Age: 77
End: 2022-02-23

## 2022-02-23 VITALS
DIASTOLIC BLOOD PRESSURE: 72 MMHG | HEART RATE: 79 BPM | WEIGHT: 178.6 LBS | HEIGHT: 60 IN | BODY MASS INDEX: 35.06 KG/M2 | TEMPERATURE: 98.6 F | RESPIRATION RATE: 20 BRPM | SYSTOLIC BLOOD PRESSURE: 126 MMHG | OXYGEN SATURATION: 99 %

## 2022-02-23 DIAGNOSIS — E13.44: Primary | ICD-10-CM

## 2022-02-23 DIAGNOSIS — Z12.31 BREAST CANCER SCREENING BY MAMMOGRAM: ICD-10-CM

## 2022-02-23 DIAGNOSIS — Z78.0 POST-MENOPAUSAL: ICD-10-CM

## 2022-02-23 DIAGNOSIS — E78.2 MIXED HYPERLIPIDEMIA: ICD-10-CM

## 2022-02-23 DIAGNOSIS — I10 ESSENTIAL HYPERTENSION: ICD-10-CM

## 2022-02-23 DIAGNOSIS — E11.8 TYPE 2 DIABETES MELLITUS WITH COMPLICATION, WITHOUT LONG-TERM CURRENT USE OF INSULIN: ICD-10-CM

## 2022-02-23 PROCEDURE — 99214 OFFICE O/P EST MOD 30 MIN: CPT | Performed by: INTERNAL MEDICINE

## 2022-02-23 NOTE — PROGRESS NOTES
"      Lydia John is a 76 y.o. female, who presents with a chief complaint of   Chief Complaint   Patient presents with   • Hyperlipidemia   • Diabetes           HPI   Pt here with her daughter for follow up    Pt has had cataract surgery since her last OV.     She saw derm and had a melanoma removed last year.  She also had a basal cell carcinoma removed from her left ankle.  This spot is still healing.  She is s/p mohs and skin grafting.  She is using vaseline and changing dressings daily per derm.     HTN - Well controlled without meds. Pt was having dizziness but this is better at this time.     DM- On metformin ER bid and januvia. Denies nausea, vomiting and diarrhea.  Pt has been cutting out carbs.  She has even cut out her pecan helen each morning.      HLD-Working on eating well, staying active with physical limitations. Tolerating crestor well.  No cramps or myalgias     Sleep - Significantly improved since initiating melatonin, continues to take ambien night. Afraid to not take ambien for fear of insomnia.      Diabetic amyotrophy-Improved mobility, walking with walker. Reports still staying active. Planning a trip to [place] this year bu jose alberto to cancel bc  of Covid.  Pain now much better overall. She does have some \"lightening strike\" episodes but these tend to pass. She hasn't had to take any pain meds recently.  She has been doing some spring cleaning as well.     Preventative-Overdue for mamogram (last in 2016), DEXA (last in 2001) and would like cologuard. Last colonoscopy in 2001.       The following portions of the patient's history were reviewed and updated as appropriate: allergies, current medications, past family history, past medical history, past social history, past surgical history and problem list.    Allergies: Patient has no known allergies.    Review of Systems   Constitutional: Negative.    HENT: Negative.    Eyes: Negative.    Respiratory: Negative.    Cardiovascular: Negative.  "   Gastrointestinal: Negative.    Endocrine: Negative.    Genitourinary: Negative.    Musculoskeletal: Negative.    Skin: Negative.    Allergic/Immunologic: Negative.    Neurological: Negative.    Hematological: Negative.    Psychiatric/Behavioral: Negative.    All other systems reviewed and are negative.            Wt Readings from Last 3 Encounters:   02/23/22 81 kg (178 lb 9.6 oz)   09/14/21 80.6 kg (177 lb 9.6 oz)   06/29/21 82.2 kg (181 lb 3.2 oz)     Temp Readings from Last 3 Encounters:   02/23/22 98.6 °F (37 °C) (Temporal)   09/14/21 97.1 °F (36.2 °C) (Temporal)   06/29/21 97.5 °F (36.4 °C) (Temporal)     BP Readings from Last 3 Encounters:   02/23/22 126/72   09/14/21 130/76   06/29/21 123/76     Pulse Readings from Last 3 Encounters:   02/23/22 79   09/14/21 87   06/29/21 99     Body mass index is 34.88 kg/m².  SpO2 Readings from Last 3 Encounters:   02/23/22 99%   09/14/21 97%   06/29/21 98%          Physical Exam  Vitals and nursing note reviewed.   Constitutional:       General: She is not in acute distress.     Appearance: She is well-developed.   HENT:      Head: Normocephalic and atraumatic.      Right Ear: External ear normal.      Left Ear: External ear normal.      Nose: Nose normal.   Eyes:      Conjunctiva/sclera: Conjunctivae normal.      Pupils: Pupils are equal, round, and reactive to light.   Cardiovascular:      Rate and Rhythm: Normal rate and regular rhythm.      Heart sounds: Normal heart sounds.   Pulmonary:      Effort: Pulmonary effort is normal. No respiratory distress.      Breath sounds: Normal breath sounds. No wheezing.   Musculoskeletal:      Cervical back: Normal range of motion and neck supple.      Comments: Walks with walker   Skin:     General: Skin is warm and dry.   Neurological:      Mental Status: She is alert and oriented to person, place, and time.      Gait: Gait abnormal.   Psychiatric:         Mood and Affect: Mood normal.         Behavior: Behavior normal.          Thought Content: Thought content normal.         Judgment: Judgment normal.         Results for orders placed or performed in visit on 09/14/21   Comprehensive Metabolic Panel    Specimen: Blood   Result Value Ref Range    Glucose 145 (H) 65 - 99 mg/dL    BUN 21 8 - 23 mg/dL    Creatinine 0.86 0.57 - 1.00 mg/dL    eGFR Non African Am 64 >60 mL/min/1.73    eGFR African Am 78 >60 mL/min/1.73    BUN/Creatinine Ratio 24.4 7.0 - 25.0    Sodium 136 136 - 145 mmol/L    Potassium 4.9 3.5 - 5.2 mmol/L    Chloride 98 98 - 107 mmol/L    Total CO2 23.4 22.0 - 29.0 mmol/L    Calcium 9.6 8.6 - 10.5 mg/dL    Total Protein 6.7 6.0 - 8.5 g/dL    Albumin 4.60 3.50 - 5.20 g/dL    Globulin 2.1 gm/dL    A/G Ratio 2.2 g/dL    Total Bilirubin 0.2 0.0 - 1.2 mg/dL    Alkaline Phosphatase 114 39 - 117 U/L    AST (SGOT) 17 1 - 32 U/L    ALT (SGPT) 15 1 - 33 U/L   Lipid Panel With LDL / HDL Ratio    Specimen: Blood   Result Value Ref Range    Total Cholesterol 150 0 - 200 mg/dL    Triglycerides 180 (H) 0 - 150 mg/dL    HDL Cholesterol 46 40 - 60 mg/dL    VLDL Cholesterol Tyrell 30 5 - 40 mg/dL    LDL Chol Calc (NIH) 74 0 - 100 mg/dL    LDL/HDL RATIO 1.48    Hemoglobin A1c    Specimen: Blood   Result Value Ref Range    Hemoglobin A1C 7.70 (H) 4.80 - 5.60 %   POCT microalbumin    Specimen: Urine   Result Value Ref Range    Microalbumin, Urine 10 mg/L     Creatinine, Urine 100 mg/dL    CBC & Differential    Specimen: Blood   Result Value Ref Range    WBC 11.12 (H) 3.40 - 10.80 10*3/mm3    RBC 4.51 3.77 - 5.28 10*6/mm3    Hemoglobin 13.1 12.0 - 15.9 g/dL    Hematocrit 39.3 34.0 - 46.6 %    MCV 87.1 79.0 - 97.0 fL    MCH 29.0 26.6 - 33.0 pg    MCHC 33.3 31.5 - 35.7 g/dL    RDW 14.5 12.3 - 15.4 %    Platelets 241 140 - 450 10*3/mm3    Neutrophil Rel % 64.8 42.7 - 76.0 %    Lymphocyte Rel % 25.2 19.6 - 45.3 %    Monocyte Rel % 5.8 5.0 - 12.0 %    Eosinophil Rel % 3.5 0.3 - 6.2 %    Basophil Rel % 0.3 0.0 - 1.5 %    Neutrophils Absolute 7.21 (H) 1.70 -  7.00 10*3/mm3    Lymphocytes Absolute 2.80 0.70 - 3.10 10*3/mm3    Monocytes Absolute 0.64 0.10 - 0.90 10*3/mm3    Eosinophils Absolute 0.39 0.00 - 0.40 10*3/mm3    Basophils Absolute 0.03 0.00 - 0.20 10*3/mm3    Immature Granulocyte Rel % 0.4 0.0 - 0.5 %    Immature Grans Absolute 0.05 0.00 - 0.05 10*3/mm3    nRBC 0.0 0.0 - 0.2 /100 WBC     Result Review :                  Assessment and Plan    Diagnoses and all orders for this visit:    1. 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 Type 2 diabetes mellitus with complication, without long-term current use of insulin (HCC)  -     Comprehensive Metabolic Panel  -     CBC & Differential  -     T4, Free  -     TSH  -     Lipid Panel With LDL / HDL Ratio  -     Hemoglobin A1c    3. Mixed hyperlipidemia  -     Comprehensive Metabolic Panel  -     Lipid Panel With LDL / HDL Ratio    4. Essential hypertension  -     Comprehensive Metabolic Panel  -     CBC & Differential  -     T4, Free  -     TSH  -     Lipid Panel With LDL / HDL Ratio  -     Hemoglobin A1c    5. Post-menopausal  -     DEXA Bone Density Axial; Future    6. Breast cancer screening by mammogram  -     Mammo Screening Bilateral With CAD; Future                   Outpatient Medications Prior to Visit   Medication Sig Dispense Refill   • amitriptyline (ELAVIL) 75 MG tablet TAKE 1 TABLET BY MOUTH EVERY DAY AT BEDTIME 90 tablet 3   • calcium citrate-vitamin d (CITRACAL) 200-250 MG-UNIT tablet tablet Take 1 tablet by mouth daily.     • DULoxetine (CYMBALTA) 60 MG capsule Take 1 capsule by mouth once daily 90 capsule 3   • furosemide (Lasix) 20 MG tablet Take 1 tablet by mouth Daily As Needed (leg swelling). 90 tablet 0   • glimepiride (AMARYL) 4 MG tablet Take 1 tablet by mouth Every Morning Before  Breakfast. 90 tablet 3   • glucose blood (Accu-Chek Mima Plus) test strip 1 each by Other route 2 (two) times a day. Use as instructed 200 each 3   • glucose blood test strip Check blood sugar once daily 100 each 0   • ibuprofen (ADVIL,MOTRIN) 200 MG tablet Take 200 mg by mouth.     • ketoconazole (NIZORAL) 2 % cream APPLY CREAM TOPICALLY TO CORNERS OF MOUTH TWICE DAILY AS NEEDED     • metFORMIN ER (GLUCOPHAGE-XR) 500 MG 24 hr tablet Take 1 tablet by mouth 2 (Two) Times a Day. 180 tablet 3   • omeprazole (priLOSEC) 20 MG capsule Take 1 capsule by mouth Daily. 90 capsule 3   • rosuvastatin (Crestor) 5 MG tablet Take 1 tablet by mouth Daily. 90 tablet 3   • SITagliptin (Januvia) 100 MG tablet Take 1 tablet by mouth Daily. 90 tablet 3   • vitamin B-12 (CYANOCOBALAMIN) 100 MCG tablet Take 50 mcg by mouth daily.     • vitamin D (ERGOCALCIFEROL) 1.25 MG (46400 UT) capsule capsule Take 1 capsule by mouth 1 (One) Time Per Week. 13 capsule 3   • zolpidem (AMBIEN) 5 MG tablet TAKE 1 TABLET BY MOUTH AT NIGHT AS NEEDED FOR SLEEP 90 tablet 0     No facility-administered medications prior to visit.     No orders of the defined types were placed in this encounter.    [unfilled]  There are no discontinued medications.      Return in about 3 months (around 5/23/2022) for Recheck, labs.    Patient was given instructions and counseling regarding her condition or for health maintenance advice. Please see specific information pulled into the AVS if appropriate.

## 2022-02-24 LAB
ALBUMIN SERPL-MCNC: 4.2 G/DL (ref 3.7–4.7)
ALBUMIN/GLOB SERPL: 1.7 {RATIO} (ref 1.2–2.2)
ALP SERPL-CCNC: 115 IU/L (ref 44–121)
ALT SERPL-CCNC: 15 IU/L (ref 0–32)
AST SERPL-CCNC: 17 IU/L (ref 0–40)
BASOPHILS # BLD AUTO: 0 X10E3/UL (ref 0–0.2)
BASOPHILS NFR BLD AUTO: 0 %
BILIRUB SERPL-MCNC: 0.2 MG/DL (ref 0–1.2)
BUN SERPL-MCNC: 20 MG/DL (ref 8–27)
BUN/CREAT SERPL: 21 (ref 12–28)
CALCIUM SERPL-MCNC: 9.5 MG/DL (ref 8.7–10.3)
CHLORIDE SERPL-SCNC: 97 MMOL/L (ref 96–106)
CHOLEST SERPL-MCNC: 181 MG/DL (ref 100–199)
CO2 SERPL-SCNC: 22 MMOL/L (ref 20–29)
CREAT SERPL-MCNC: 0.97 MG/DL (ref 0.57–1)
EOSINOPHIL # BLD AUTO: 0.3 X10E3/UL (ref 0–0.4)
EOSINOPHIL NFR BLD AUTO: 3 %
ERYTHROCYTE [DISTWIDTH] IN BLOOD BY AUTOMATED COUNT: 13.9 % (ref 11.7–15.4)
GLOBULIN SER CALC-MCNC: 2.5 G/DL (ref 1.5–4.5)
GLUCOSE SERPL-MCNC: 266 MG/DL (ref 65–99)
HBA1C MFR BLD: 10.6 % (ref 4.8–5.6)
HCT VFR BLD AUTO: 40.7 % (ref 34–46.6)
HDLC SERPL-MCNC: 57 MG/DL
HGB BLD-MCNC: 13.5 G/DL (ref 11.1–15.9)
IMM GRANULOCYTES # BLD AUTO: 0.1 X10E3/UL (ref 0–0.1)
IMM GRANULOCYTES NFR BLD AUTO: 1 %
LDLC SERPL CALC-MCNC: 88 MG/DL (ref 0–99)
LDLC/HDLC SERPL: 1.5 RATIO (ref 0–3.2)
LYMPHOCYTES # BLD AUTO: 2.6 X10E3/UL (ref 0.7–3.1)
LYMPHOCYTES NFR BLD AUTO: 24 %
MCH RBC QN AUTO: 28.4 PG (ref 26.6–33)
MCHC RBC AUTO-ENTMCNC: 33.2 G/DL (ref 31.5–35.7)
MCV RBC AUTO: 86 FL (ref 79–97)
MONOCYTES # BLD AUTO: 0.6 X10E3/UL (ref 0.1–0.9)
MONOCYTES NFR BLD AUTO: 6 %
NEUTROPHILS # BLD AUTO: 7 X10E3/UL (ref 1.4–7)
NEUTROPHILS NFR BLD AUTO: 66 %
PLATELET # BLD AUTO: 252 X10E3/UL (ref 150–450)
POTASSIUM SERPL-SCNC: 5.1 MMOL/L (ref 3.5–5.2)
PROT SERPL-MCNC: 6.7 G/DL (ref 6–8.5)
RBC # BLD AUTO: 4.76 X10E6/UL (ref 3.77–5.28)
SODIUM SERPL-SCNC: 135 MMOL/L (ref 134–144)
T4 FREE SERPL-MCNC: 1.14 NG/DL (ref 0.82–1.77)
TRIGL SERPL-MCNC: 216 MG/DL (ref 0–149)
TSH SERPL DL<=0.005 MIU/L-ACNC: 3.27 UIU/ML (ref 0.45–4.5)
VLDLC SERPL CALC-MCNC: 36 MG/DL (ref 5–40)
WBC # BLD AUTO: 10.6 X10E3/UL (ref 3.4–10.8)

## 2022-03-04 DIAGNOSIS — E13.44: Primary | ICD-10-CM

## 2022-03-04 DIAGNOSIS — E11.8 TYPE 2 DIABETES MELLITUS WITH COMPLICATION, WITHOUT LONG-TERM CURRENT USE OF INSULIN: ICD-10-CM

## 2022-03-21 ENCOUNTER — DOCUMENTATION (OUTPATIENT)
Dept: INTERNAL MEDICINE | Facility: CLINIC | Age: 77
End: 2022-03-21

## 2022-03-21 DIAGNOSIS — M25.531 WRIST PAIN, ACUTE, RIGHT: Primary | ICD-10-CM

## 2022-03-22 ENCOUNTER — HOSPITAL ENCOUNTER (OUTPATIENT)
Dept: GENERAL RADIOLOGY | Facility: HOSPITAL | Age: 77
Discharge: HOME | End: 2022-03-22
Admitting: INTERNAL MEDICINE

## 2022-03-22 DIAGNOSIS — M25.531 WRIST PAIN, ACUTE, RIGHT: ICD-10-CM

## 2022-03-22 PROCEDURE — 73110 X-RAY EXAM OF WRIST: CPT

## 2022-05-08 DIAGNOSIS — G47.9 SLEEP DIFFICULTIES: ICD-10-CM

## 2022-05-08 DIAGNOSIS — F43.21 SITUATIONAL DEPRESSION: ICD-10-CM

## 2022-05-08 DIAGNOSIS — E11.44 DIABETIC AMYOTROPHY ASSOCIATED WITH TYPE 2 DIABETES MELLITUS: ICD-10-CM

## 2022-05-11 RX ORDER — ZOLPIDEM TARTRATE 5 MG/1
5 TABLET ORAL NIGHTLY PRN
Qty: 90 TABLET | Refills: 1 | Status: SHIPPED | OUTPATIENT
Start: 2022-05-11 | End: 2022-05-16 | Stop reason: SDUPTHER

## 2022-05-11 RX ORDER — DULOXETIN HYDROCHLORIDE 60 MG/1
CAPSULE, DELAYED RELEASE ORAL
Qty: 90 CAPSULE | Refills: 1 | Status: SHIPPED | OUTPATIENT
Start: 2022-05-11 | End: 2022-11-02

## 2022-05-11 NOTE — TELEPHONE ENCOUNTER
Rx Refill Note  Requested Prescriptions     Pending Prescriptions Disp Refills    DULoxetine (CYMBALTA) 60 MG capsule [Pharmacy Med Name: DULoxetine HCl 60 MG Oral Capsule Delayed Release Particles] 90 capsule 0     Sig: Take 1 capsule by mouth once daily    zolpidem (AMBIEN) 5 MG tablet [Pharmacy Med Name: Zolpidem Tartrate 5 MG Oral Tablet] 90 tablet 0     Sig: TAKE 1 TABLET BY MOUTH AT NIGHT AS NEEDED FOR SLEEP      Last office visit with prescribing clinician: 2/23/2022      Next office visit with prescribing clinician: 5/16/2022            Mireya Moe MA  05/11/22, 10:34 EDT

## 2022-05-16 ENCOUNTER — OFFICE VISIT (OUTPATIENT)
Dept: INTERNAL MEDICINE | Facility: CLINIC | Age: 77
End: 2022-05-16

## 2022-05-16 VITALS
BODY MASS INDEX: 34.28 KG/M2 | RESPIRATION RATE: 17 BRPM | TEMPERATURE: 98 F | HEIGHT: 60 IN | DIASTOLIC BLOOD PRESSURE: 86 MMHG | SYSTOLIC BLOOD PRESSURE: 128 MMHG | WEIGHT: 174.6 LBS | OXYGEN SATURATION: 99 % | HEART RATE: 89 BPM

## 2022-05-16 DIAGNOSIS — E78.2 MIXED HYPERLIPIDEMIA: ICD-10-CM

## 2022-05-16 DIAGNOSIS — E13.44: Primary | ICD-10-CM

## 2022-05-16 DIAGNOSIS — E11.44 TYPE 2 DIABETES MELLITUS WITH DIABETIC AMYOTROPHY, WITHOUT LONG-TERM CURRENT USE OF INSULIN: ICD-10-CM

## 2022-05-16 DIAGNOSIS — R29.898 WEAKNESS OF BOTH LOWER EXTREMITIES: ICD-10-CM

## 2022-05-16 DIAGNOSIS — G47.9 SLEEP DIFFICULTIES: ICD-10-CM

## 2022-05-16 PROBLEM — S66.911A MUSCLE STRAIN OF RIGHT WRIST: Status: ACTIVE | Noted: 2022-05-16

## 2022-05-16 LAB
POC CREATININE URINE: NORMAL
POC MICROALBUMIN URINE: NORMAL

## 2022-05-16 PROCEDURE — 82044 UR ALBUMIN SEMIQUANTITATIVE: CPT | Performed by: INTERNAL MEDICINE

## 2022-05-16 PROCEDURE — 99214 OFFICE O/P EST MOD 30 MIN: CPT | Performed by: INTERNAL MEDICINE

## 2022-05-16 RX ORDER — ZOLPIDEM TARTRATE 5 MG/1
5 TABLET ORAL NIGHTLY PRN
Qty: 90 TABLET | Refills: 1 | Status: SHIPPED | OUTPATIENT
Start: 2022-05-16 | End: 2022-11-15

## 2022-05-16 NOTE — PROGRESS NOTES
Lydia John is a 76 y.o. female, who presents with a chief complaint of   Chief Complaint   Patient presents with   • Diabetes     Follow up visit.       HPI     Here with her daughter for follow-up.     Right wrist pain-Struggling with right wrist pain since march. XR wnl. Wearing wrist brace which has helped but discomfort that has still been persistent.  Duration of at least 2-3 mos now. Struggling with putting on makeup and brushing teeth due to weakness. Did fall on outstretched hand beginning of march and noticed this pain 1 week following. No numbness or tingling associated.     DM2-Recent a1c 7-10. On metformin, amaryl, ozempic. Was struggling with worsening nausea and gas after increase in Ozempic. Appetite was down during that period but is better now. Denies any shortness of air, chest pain or pressure. Fasting glucoses 121-160s, largely 140-170s no hypoglycemia. This is significantly improved from previous glucoses. Diet pretty stable without much variation. Weight down 4 lbs from last visit. Trying to limit carbs.     HLD-Continues on crestor, no myaglias or cramps.     HTN-Blood pressure stable at 128/86, repots some dizziness last week that has since improved. No headaches.     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 PT and family feel she's not strong enough to use only a cane at this time.     Sleep-Continues to take melatonin nightly. Intermittently uses ambien.     GERD-Stable, continues on prilosec.     Health maintenance-Appointments for DEXA and mammogram scheduled. Needs zostavax and tdap. Got eye exam at opt, will do foot exam today.     The following portions of the patient's history were reviewed and updated as appropriate: allergies, current medications, past family history, past medical history, past social history, past surgical history and problem list.    Allergies: Patient has no known allergies.    Current Outpatient Medications:   •  amitriptyline  (ELAVIL) 75 MG tablet, TAKE 1 TABLET BY MOUTH EVERY DAY AT BEDTIME, Disp: 90 tablet, Rfl: 3  •  calcium citrate-vitamin d (CITRACAL) 200-250 MG-UNIT tablet tablet, Take 1 tablet by mouth daily., Disp: , Rfl:   •  DULoxetine (CYMBALTA) 60 MG capsule, Take 1 capsule by mouth once daily, Disp: 90 capsule, Rfl: 1  •  glimepiride (AMARYL) 4 MG tablet, Take 1 tablet by mouth Every Morning Before Breakfast., Disp: 90 tablet, Rfl: 3  •  glucose blood (Accu-Chek Mima Plus) test strip, 1 each by Other route 2 (two) times a day. Use as instructed, Disp: 200 each, Rfl: 3  •  glucose blood test strip, Check blood sugar once daily, Disp: 100 each, Rfl: 0  •  hydrocortisone 2.5 % cream, APPLY CREAM TO AFFECTED AREA TWICE DAILY FOR 14 DAYS, Disp: , Rfl:   •  ibuprofen (ADVIL,MOTRIN) 200 MG tablet, Take 200 mg by mouth., Disp: , Rfl:   •  metFORMIN ER (GLUCOPHAGE-XR) 500 MG 24 hr tablet, Take 1 tablet by mouth 2 (Two) Times a Day., Disp: 180 tablet, Rfl: 3  •  omeprazole (priLOSEC) 20 MG capsule, Take 1 capsule by mouth Daily., Disp: 90 capsule, Rfl: 3  •  rosuvastatin (Crestor) 5 MG tablet, Take 1 tablet by mouth Daily., Disp: 90 tablet, Rfl: 3  •  Semaglutide,0.25 or 0.5MG/DOS, (OZEMPIC) 2 MG/1.5ML solution pen-injector, Inject 0.25 mg under the skin into the appropriate area as directed 1 (One) Time Per Week. X 4 weeks then increase to 0.5mg sq weekly x 4 weeks, Disp: 1.5 mL, Rfl: 1  •  vitamin B-12 (CYANOCOBALAMIN) 100 MCG tablet, Take 50 mcg by mouth daily., Disp: , Rfl:   •  vitamin D (ERGOCALCIFEROL) 1.25 MG (31424 UT) capsule capsule, Take 1 capsule by mouth 1 (One) Time Per Week., Disp: 13 capsule, Rfl: 3  •  zolpidem (AMBIEN) 5 MG tablet, Take 1 tablet by mouth At Night As Needed for Sleep., Disp: 90 tablet, Rfl: 1  •  furosemide (Lasix) 20 MG tablet, Take 1 tablet by mouth Daily As Needed (leg swelling)., Disp: 90 tablet, Rfl: 0  •  ketoconazole (NIZORAL) 2 % cream, APPLY CREAM TOPICALLY TO CORNERS OF MOUTH TWICE DAILY  "AS NEEDED, Disp: , Rfl:   Medications Discontinued During This Encounter   Medication Reason   • zolpidem (AMBIEN) 5 MG tablet Reorder       Review of Systems   Constitutional: Negative for activity change, appetite change, chills and fatigue.   HENT: Negative for congestion, dental problem and sore throat.    Respiratory: Negative for cough and shortness of breath.    Cardiovascular: Negative for chest pain and leg swelling.   Gastrointestinal: Positive for nausea. Negative for constipation, diarrhea and vomiting.   Endocrine: Negative for cold intolerance and heat intolerance.   Genitourinary: Negative for difficulty urinating.   Musculoskeletal: Positive for arthralgias. Negative for neck pain and neck stiffness.   Skin: Negative for rash.   Neurological: Positive for dizziness. Negative for headaches.   Hematological: Negative for adenopathy. Does not bruise/bleed easily.             /86 (BP Location: Left arm, Patient Position: Sitting, Cuff Size: Adult)   Pulse 89   Temp 98 °F (36.7 °C) (Oral)   Resp 17   Ht 152.4 cm (60\")   Wt 79.2 kg (174 lb 9.6 oz)   LMP  (LMP Unknown)   SpO2 99%   BMI 34.10 kg/m²       Physical Exam  Vitals and nursing note reviewed.   Constitutional:       Appearance: Normal appearance. She is well-developed and normal weight.   HENT:      Head: Normocephalic and atraumatic.      Right Ear: External ear normal.      Left Ear: External ear normal.      Nose: Nose normal. No congestion.      Mouth/Throat:      Mouth: Mucous membranes are moist.      Pharynx: Oropharynx is clear. No oropharyngeal exudate.   Eyes:      Conjunctiva/sclera: Conjunctivae normal.      Pupils: Pupils are equal, round, and reactive to light.   Cardiovascular:      Rate and Rhythm: Normal rate and regular rhythm.      Pulses: Normal pulses.      Heart sounds: Normal heart sounds.   Pulmonary:      Effort: Pulmonary effort is normal. No respiratory distress.      Breath sounds: Normal breath sounds. "   Abdominal:      General: Abdomen is flat. Bowel sounds are normal. There is no distension.      Palpations: Abdomen is soft.      Tenderness: There is no abdominal tenderness.   Musculoskeletal:         General: Tenderness (Tenderness appreciated along lateral wrist, worsened with wrist flexion, no pain on bony surfaces or with wrist abduction ) present. Normal range of motion.      Cervical back: Normal range of motion and neck supple.   Skin:     General: Skin is warm and dry.      Capillary Refill: Capillary refill takes less than 2 seconds.      Findings: No lesion.   Neurological:      General: No focal deficit present.      Mental Status: She is alert.   Psychiatric:         Behavior: Behavior normal.         Diabetic foot exam:   Left: Filament test Diminished senastion in left 1-3rd digits and metatarsal region    Pulses Dorsalis Pedis:  present  Posterior Tibial:  present   Reflexes 2+    Vibratory sensation diminished   Proprioception normal   Sharp/dull discrimination normal       Right: Filament test present   Pulses Dorsalis Pedis:  present  Posterior Tibial:  present   Reflexes 2+    Vibratory sensation normal   Proprioception normal   Sharp/dull discrimination normal      Lab Results (most recent)     None          Results for orders placed or performed in visit on 05/16/22   POCT microalbumin    Specimen: Urine   Result Value Ref Range    Microalbumin, Urine 30mg     Creatinine, Urine 200mg            Diagnoses and all orders for this visit:    1. 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 Sleep difficulties  -     zolpidem (AMBIEN) 5 MG tablet; Take 1 tablet by mouth At Night As Needed for Sleep.  Dispense: 90 tablet; Refill: 1    3. Mixed hyperlipidemia  -     Comprehensive  Metabolic Panel  -     Lipid Panel With LDL / HDL Ratio    4. Weakness of both lower extremities    5. 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    Christophe Orozco MD  Internal Medicine-Pediatrics, PGY-4    I have seen and examined the patient independently.  Labs today.  Based on pt's glucose readings at home a1c should be trending down.  Pt tolerating 0.5mg/wk of ozempic.  Try to limit pressure on wrist when pt uses walker and possibly even use device to allow pt to rest forearm on walker instead of wrist.  Cont wearing brace and add voltaren gel 4 x a day.  Xray of wrist neg for fracture. Agree with above.     Selena Davis M.D.  Attending physician  Internal Medicine and Pediatrics

## 2022-05-17 LAB
ALBUMIN SERPL-MCNC: 4.4 G/DL (ref 3.7–4.7)
ALBUMIN/GLOB SERPL: 1.8 {RATIO} (ref 1.2–2.2)
ALP SERPL-CCNC: 108 IU/L (ref 44–121)
ALT SERPL-CCNC: 22 IU/L (ref 0–32)
AST SERPL-CCNC: 18 IU/L (ref 0–40)
BASOPHILS # BLD AUTO: 0 X10E3/UL (ref 0–0.2)
BASOPHILS NFR BLD AUTO: 0 %
BILIRUB SERPL-MCNC: <0.2 MG/DL (ref 0–1.2)
BUN SERPL-MCNC: 17 MG/DL (ref 8–27)
BUN/CREAT SERPL: 20 (ref 12–28)
CALCIUM SERPL-MCNC: 9.8 MG/DL (ref 8.7–10.3)
CHLORIDE SERPL-SCNC: 96 MMOL/L (ref 96–106)
CHOLEST SERPL-MCNC: 204 MG/DL (ref 100–199)
CO2 SERPL-SCNC: 21 MMOL/L (ref 20–29)
CREAT SERPL-MCNC: 0.83 MG/DL (ref 0.57–1)
EGFRCR SERPLBLD CKD-EPI 2021: 73 ML/MIN/1.73
EOSINOPHIL # BLD AUTO: 0.3 X10E3/UL (ref 0–0.4)
EOSINOPHIL NFR BLD AUTO: 2 %
ERYTHROCYTE [DISTWIDTH] IN BLOOD BY AUTOMATED COUNT: 13.8 % (ref 11.7–15.4)
GLOBULIN SER CALC-MCNC: 2.4 G/DL (ref 1.5–4.5)
GLUCOSE SERPL-MCNC: 134 MG/DL (ref 65–99)
HBA1C MFR BLD: 9.9 % (ref 4.8–5.6)
HCT VFR BLD AUTO: 42.6 % (ref 34–46.6)
HDLC SERPL-MCNC: 48 MG/DL
HGB BLD-MCNC: 13.9 G/DL (ref 11.1–15.9)
IMM GRANULOCYTES # BLD AUTO: 0.1 X10E3/UL (ref 0–0.1)
IMM GRANULOCYTES NFR BLD AUTO: 1 %
LDLC SERPL CALC-MCNC: 118 MG/DL (ref 0–99)
LDLC/HDLC SERPL: 2.5 RATIO (ref 0–3.2)
LYMPHOCYTES # BLD AUTO: 2.8 X10E3/UL (ref 0.7–3.1)
LYMPHOCYTES NFR BLD AUTO: 26 %
MCH RBC QN AUTO: 28 PG (ref 26.6–33)
MCHC RBC AUTO-ENTMCNC: 32.6 G/DL (ref 31.5–35.7)
MCV RBC AUTO: 86 FL (ref 79–97)
MONOCYTES # BLD AUTO: 0.6 X10E3/UL (ref 0.1–0.9)
MONOCYTES NFR BLD AUTO: 6 %
NEUTROPHILS # BLD AUTO: 7.1 X10E3/UL (ref 1.4–7)
NEUTROPHILS NFR BLD AUTO: 65 %
PLATELET # BLD AUTO: 300 X10E3/UL (ref 150–450)
POTASSIUM SERPL-SCNC: 4.8 MMOL/L (ref 3.5–5.2)
PROT SERPL-MCNC: 6.8 G/DL (ref 6–8.5)
RBC # BLD AUTO: 4.96 X10E6/UL (ref 3.77–5.28)
SODIUM SERPL-SCNC: 136 MMOL/L (ref 134–144)
T4 FREE SERPL-MCNC: 1.16 NG/DL (ref 0.82–1.77)
TRIGL SERPL-MCNC: 219 MG/DL (ref 0–149)
TSH SERPL DL<=0.005 MIU/L-ACNC: 2.93 UIU/ML (ref 0.45–4.5)
VLDLC SERPL CALC-MCNC: 38 MG/DL (ref 5–40)
WBC # BLD AUTO: 10.9 X10E3/UL (ref 3.4–10.8)

## 2022-05-25 ENCOUNTER — TELEPHONE (OUTPATIENT)
Dept: INTERNAL MEDICINE | Facility: CLINIC | Age: 77
End: 2022-05-25

## 2022-05-25 NOTE — TELEPHONE ENCOUNTER
Ok for HUB to read    Called and informed Pt of results. Left results on VM.    Call pt about labs.  A1c 9.9.  cont higher dose of ozempic if tolerated.     Told them if they had any questions to call back.

## 2022-05-25 NOTE — TELEPHONE ENCOUNTER
----- Message from Selena Davis MD sent at 5/24/2022  3:37 PM EDT -----  Call pt about labs.  A1c 9.9.  cont higher dose of ozempic if tolerated.    Notification Instructions: Patient will be notified of biopsy results. However, patient instructed to call the office if not contacted within 2 weeks.

## 2022-06-28 DIAGNOSIS — K29.00 ACUTE GASTRITIS WITHOUT HEMORRHAGE, UNSPECIFIED GASTRITIS TYPE: ICD-10-CM

## 2022-06-28 RX ORDER — OMEPRAZOLE 20 MG/1
CAPSULE, DELAYED RELEASE ORAL
Qty: 90 CAPSULE | Refills: 0 | Status: SHIPPED | OUTPATIENT
Start: 2022-06-28 | End: 2022-08-15

## 2022-06-28 NOTE — TELEPHONE ENCOUNTER
Rx Refill Note  Requested Prescriptions     Pending Prescriptions Disp Refills   • omeprazole (priLOSEC) 20 MG capsule [Pharmacy Med Name: Omeprazole 20 MG Oral Capsule Delayed Release] 90 capsule 0     Sig: Take 1 capsule by mouth once daily      Last office visit with prescribing clinician: 5/16/2022      Next office visit with prescribing clinician: 8/16/2022            Genie Vance MA  06/28/22, 13:17 EDT

## 2022-07-20 ENCOUNTER — TELEPHONE (OUTPATIENT)
Dept: INTERNAL MEDICINE | Facility: CLINIC | Age: 77
End: 2022-07-20

## 2022-07-20 NOTE — TELEPHONE ENCOUNTER
Pt is due for AWV. Left VM for patient to try to schedule or to see if she would like to adjust the time of her appt on 8/16 to make it a 30 min AWV. Hub to share.

## 2022-08-14 DIAGNOSIS — K29.00 ACUTE GASTRITIS WITHOUT HEMORRHAGE, UNSPECIFIED GASTRITIS TYPE: ICD-10-CM

## 2022-08-15 RX ORDER — OMEPRAZOLE 20 MG/1
CAPSULE, DELAYED RELEASE ORAL
Qty: 90 CAPSULE | Refills: 0 | Status: SHIPPED | OUTPATIENT
Start: 2022-08-15 | End: 2022-11-15 | Stop reason: SDUPTHER

## 2022-08-16 ENCOUNTER — OFFICE VISIT (OUTPATIENT)
Dept: INTERNAL MEDICINE | Facility: CLINIC | Age: 77
End: 2022-08-16

## 2022-08-16 VITALS
HEIGHT: 60 IN | DIASTOLIC BLOOD PRESSURE: 84 MMHG | BODY MASS INDEX: 34.44 KG/M2 | OXYGEN SATURATION: 98 % | TEMPERATURE: 97.5 F | WEIGHT: 175.4 LBS | HEART RATE: 79 BPM | SYSTOLIC BLOOD PRESSURE: 132 MMHG

## 2022-08-16 DIAGNOSIS — E11.649 TYPE 2 DIABETES MELLITUS WITH HYPOGLYCEMIA WITHOUT COMA, WITHOUT LONG-TERM CURRENT USE OF INSULIN: ICD-10-CM

## 2022-08-16 DIAGNOSIS — K21.9 GASTROESOPHAGEAL REFLUX DISEASE, UNSPECIFIED WHETHER ESOPHAGITIS PRESENT: ICD-10-CM

## 2022-08-16 DIAGNOSIS — E11.69 TYPE 2 DIABETES MELLITUS WITH OTHER SPECIFIED COMPLICATION, UNSPECIFIED WHETHER LONG TERM INSULIN USE: ICD-10-CM

## 2022-08-16 DIAGNOSIS — G47.9 SLEEP DIFFICULTIES: ICD-10-CM

## 2022-08-16 DIAGNOSIS — I10 ESSENTIAL HYPERTENSION: ICD-10-CM

## 2022-08-16 DIAGNOSIS — Z00.00 MEDICARE ANNUAL WELLNESS VISIT, SUBSEQUENT: Primary | ICD-10-CM

## 2022-08-16 DIAGNOSIS — E13.44: ICD-10-CM

## 2022-08-16 DIAGNOSIS — E11.8 TYPE 2 DIABETES MELLITUS WITH COMPLICATION, WITHOUT LONG-TERM CURRENT USE OF INSULIN: ICD-10-CM

## 2022-08-16 DIAGNOSIS — E78.2 MIXED HYPERLIPIDEMIA: ICD-10-CM

## 2022-08-16 PROCEDURE — 1170F FXNL STATUS ASSESSED: CPT | Performed by: INTERNAL MEDICINE

## 2022-08-16 PROCEDURE — G0439 PPPS, SUBSEQ VISIT: HCPCS | Performed by: INTERNAL MEDICINE

## 2022-08-16 PROCEDURE — 99214 OFFICE O/P EST MOD 30 MIN: CPT | Performed by: INTERNAL MEDICINE

## 2022-08-16 PROCEDURE — 1160F RVW MEDS BY RX/DR IN RCRD: CPT | Performed by: INTERNAL MEDICINE

## 2022-08-16 NOTE — PROGRESS NOTES
Lydia John is a 77 y.o. female, who presents with a chief complaint of   Chief Complaint   Patient presents with   • Diabetes   • Medicare Wellness-subsequent           HPI   Here with her daughter for follow-up.      Right wrist pain-Struggling with right wrist pain since march. XR wnl. Wearing wrist brace which has helped but discomfort that has still been persistent.  Duration of at least 6 mos now. Pt is using voltaren gel and wearing her brace.  She doesn't want surgery and doesn't want to do OT right now.       DM2- Last a1c 9.9. On metformin, glimiperide, ozempic.  pt was having dizzy spells with hypoglycemia.  Pt weaned off metformin.  Pt's glucoses have been lower in general .  Most am glucoses are 140's are lower.  Her highest glucose was 170.  Since stopping the metformin spells have been less frequent.  She has only had one spell and she skipped lunch that day.    HLD-Continues on crestor, no myaglias or cramps.      HTN-Blood pressure high in office , repots some dizziness last week that has since improved. No headaches.      Diabetic amyotrophy - Continues to improve, walking with walker. PT and family feel she's not strong enough to use only a cane at this time.      Sleep - Continues to take melatonin nightly. Intermittently uses ambien.      GERD- gerd has been worse since on ozempic.  She had to increase her omeprazole to bid dosing.     The following portions of the patient's history were reviewed and updated as appropriate: allergies, current medications, past family history, past medical history, past social history, past surgical history and problem list.    Allergies: Patient has no known allergies.    Review of Systems   Constitutional: Negative.    HENT: Negative.    Eyes: Negative.    Respiratory: Negative.    Cardiovascular: Negative.    Gastrointestinal: Negative.    Endocrine: Negative.    Genitourinary: Negative.    Musculoskeletal: Negative.    Skin: Negative.     Allergic/Immunologic: Negative.    Neurological: Negative.    Hematological: Negative.    Psychiatric/Behavioral: Negative.    All other systems reviewed and are negative.            Wt Readings from Last 3 Encounters:   08/16/22 79.6 kg (175 lb 6.4 oz)   05/16/22 79.2 kg (174 lb 9.6 oz)   02/23/22 81 kg (178 lb 9.6 oz)     Temp Readings from Last 3 Encounters:   08/16/22 97.5 °F (36.4 °C)   05/16/22 98 °F (36.7 °C) (Oral)   02/23/22 98.6 °F (37 °C) (Temporal)     BP Readings from Last 3 Encounters:   08/16/22 132/84   05/16/22 128/86   02/23/22 126/72     Pulse Readings from Last 3 Encounters:   08/16/22 79   05/16/22 89   02/23/22 79     Body mass index is 34.26 kg/m².  SpO2 Readings from Last 3 Encounters:   08/16/22 98%   05/16/22 99%   02/23/22 99%          Physical Exam  Vitals and nursing note reviewed.   Constitutional:       General: She is not in acute distress.     Appearance: She is well-developed.   HENT:      Head: Normocephalic and atraumatic.      Right Ear: External ear normal.      Left Ear: External ear normal.      Nose: Nose normal.   Eyes:      Conjunctiva/sclera: Conjunctivae normal.      Pupils: Pupils are equal, round, and reactive to light.   Cardiovascular:      Rate and Rhythm: Normal rate and regular rhythm.      Heart sounds: Normal heart sounds.   Pulmonary:      Effort: Pulmonary effort is normal. No respiratory distress.      Breath sounds: Normal breath sounds. No wheezing.   Musculoskeletal:         General: Normal range of motion.      Cervical back: Normal range of motion and neck supple.      Comments: Normal gait   Skin:     General: Skin is warm and dry.   Neurological:      General: No focal deficit present.      Mental Status: She is alert and oriented to person, place, and time.   Psychiatric:         Mood and Affect: Mood normal.         Behavior: Behavior normal.         Thought Content: Thought content normal.         Judgment: Judgment normal.         Results for  orders placed or performed in visit on 05/16/22   Comprehensive Metabolic Panel    Specimen: Blood   Result Value Ref Range    Glucose 134 (H) 65 - 99 mg/dL    BUN 17 8 - 27 mg/dL    Creatinine 0.83 0.57 - 1.00 mg/dL    EGFR Result 73 >59 mL/min/1.73    BUN/Creatinine Ratio 20 12 - 28    Sodium 136 134 - 144 mmol/L    Potassium 4.8 3.5 - 5.2 mmol/L    Chloride 96 96 - 106 mmol/L    Total CO2 21 20 - 29 mmol/L    Calcium 9.8 8.7 - 10.3 mg/dL    Total Protein 6.8 6.0 - 8.5 g/dL    Albumin 4.4 3.7 - 4.7 g/dL    Globulin 2.4 1.5 - 4.5 g/dL    A/G Ratio 1.8 1.2 - 2.2    Total Bilirubin <0.2 0.0 - 1.2 mg/dL    Alkaline Phosphatase 108 44 - 121 IU/L    AST (SGOT) 18 0 - 40 IU/L    ALT (SGPT) 22 0 - 32 IU/L   Hemoglobin A1c    Specimen: Blood   Result Value Ref Range    Hemoglobin A1C 9.9 (H) 4.8 - 5.6 %   Lipid Panel With LDL / HDL Ratio    Specimen: Blood   Result Value Ref Range    Total Cholesterol 204 (H) 100 - 199 mg/dL    Triglycerides 219 (H) 0 - 149 mg/dL    HDL Cholesterol 48 >39 mg/dL    VLDL Cholesterol Tyrell 38 5 - 40 mg/dL    LDL Chol Calc (NIH) 118 (H) 0 - 99 mg/dL    LDL/HDL RATIO 2.5 0.0 - 3.2 ratio   T4, Free    Specimen: Blood   Result Value Ref Range    Free T4 1.16 0.82 - 1.77 ng/dL   TSH    Specimen: Blood   Result Value Ref Range    TSH 2.930 0.450 - 4.500 uIU/mL   POCT microalbumin    Specimen: Urine   Result Value Ref Range    Microalbumin, Urine 30mg     Creatinine, Urine 200mg    CBC & Differential    Specimen: Blood   Result Value Ref Range    WBC 10.9 (H) 3.4 - 10.8 x10E3/uL    RBC 4.96 3.77 - 5.28 x10E6/uL    Hemoglobin 13.9 11.1 - 15.9 g/dL    Hematocrit 42.6 34.0 - 46.6 %    MCV 86 79 - 97 fL    MCH 28.0 26.6 - 33.0 pg    MCHC 32.6 31.5 - 35.7 g/dL    RDW 13.8 11.7 - 15.4 %    Platelets 300 150 - 450 x10E3/uL    Neutrophil Rel % 65 Not Estab. %    Lymphocyte Rel % 26 Not Estab. %    Monocyte Rel % 6 Not Estab. %    Eosinophil Rel % 2 Not Estab. %    Basophil Rel % 0 Not Estab. %    Neutrophils  Absolute 7.1 (H) 1.4 - 7.0 x10E3/uL    Lymphocytes Absolute 2.8 0.7 - 3.1 x10E3/uL    Monocytes Absolute 0.6 0.1 - 0.9 x10E3/uL    Eosinophils Absolute 0.3 0.0 - 0.4 x10E3/uL    Basophils Absolute 0.0 0.0 - 0.2 x10E3/uL    Immature Granulocyte Rel % 1 Not Estab. %    Immature Grans Absolute 0.1 0.0 - 0.1 x10E3/uL     Result Review :                  Assessment and Plan    Diagnoses and all orders for this visit:    1. Medicare annual wellness visit, subsequent (Primary)    2. 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 X 4 weeks then increase to 0.5mg sq weekly x 4 weeks  Dispense: 1.5 mL; Refill: 5    3. Type 2 diabetes mellitus with complication, without long-term current use of insulin (HCC)  -     Semaglutide,0.25 or 0.5MG/DOS, (OZEMPIC) 2 MG/1.5ML solution pen-injector; Inject 0.5 mg under the skin into the appropriate area as directed 1 (One) Time Per Week. X 4 weeks then increase to 0.5mg sq weekly x 4 weeks  Dispense: 1.5 mL; Refill: 5  -     glucose blood (Accu-Chek Mima Plus) test strip; 1 each by Other route 2 (Two) Times a Day. Use as instructed  Dispense: 200 each; Refill: 3    4. Type 2 diabetes mellitus with other specified complication, unspecified whether long term insulin use (HCC)    5. Type 2 diabetes mellitus with hypoglycemia without coma, without long-term current use of insulin (HCC)  -     Semaglutide,0.25 or 0.5MG/DOS, (OZEMPIC) 2 MG/1.5ML solution pen-injector; Inject 0.5 mg under the skin into the appropriate area as directed 1 (One) Time Per Week. X 4 weeks then increase to 0.5mg sq weekly x 4 weeks  Dispense: 1.5 mL; Refill: 5  -     glucose blood (Accu-Chek Mima Plus) test strip; 1 each by Other route 2 (Two) Times a Day. Use as instructed  Dispense: 200 each; Refill: 3  -     CBC &  Differential  -     Comprehensive Metabolic Panel  -     Hemoglobin A1c  -     Lipid Panel With LDL / HDL Ratio    6. Sleep difficulties - cont ambien    7. Mixed hyperlipidemia - cont statin    8. Essential hypertension - bp elevated when pt got to office but improved on recheck    9. Gastroesophageal reflux disease, unspecified whether esophagitis present - pt had worsening gerd and has been taking ppi bid.  Will change to ppi in am prior to meal and pepcid in afternoon to help limit ppi exposure and potential long term side effects from this med.               Outpatient Medications Prior to Visit   Medication Sig Dispense Refill   • amitriptyline (ELAVIL) 75 MG tablet TAKE 1 TABLET BY MOUTH EVERY DAY AT BEDTIME 90 tablet 3   • calcium citrate-vitamin d (CITRACAL) 200-250 MG-UNIT tablet tablet Take 1 tablet by mouth daily.     • DULoxetine (CYMBALTA) 60 MG capsule Take 1 capsule by mouth once daily 90 capsule 1   • glimepiride (AMARYL) 4 MG tablet Take 1 tablet by mouth Every Morning Before Breakfast. 90 tablet 3   • glucose blood test strip Check blood sugar once daily 100 each 0   • hydrocortisone 2.5 % cream APPLY CREAM TO AFFECTED AREA TWICE DAILY FOR 14 DAYS     • ibuprofen (ADVIL,MOTRIN) 200 MG tablet Take 200 mg by mouth.     • omeprazole (priLOSEC) 20 MG capsule Take 1 capsule by mouth once daily 90 capsule 0   • rosuvastatin (Crestor) 5 MG tablet Take 1 tablet by mouth Daily. 90 tablet 3   • vitamin B-12 (CYANOCOBALAMIN) 100 MCG tablet Take 50 mcg by mouth daily.     • vitamin D (ERGOCALCIFEROL) 1.25 MG (89743 UT) capsule capsule Take 1 capsule by mouth 1 (One) Time Per Week. 13 capsule 3   • zolpidem (AMBIEN) 5 MG tablet Take 1 tablet by mouth At Night As Needed for Sleep. 90 tablet 1   • glucose blood (Accu-Chek Mima Plus) test strip 1 each by Other route 2 (two) times a day. Use as instructed 200 each 3   • Semaglutide,0.25 or 0.5MG/DOS, (OZEMPIC) 2 MG/1.5ML solution pen-injector Inject 0.25 mg under  the skin into the appropriate area as directed 1 (One) Time Per Week. X 4 weeks then increase to 0.5mg sq weekly x 4 weeks 1.5 mL 1   • furosemide (Lasix) 20 MG tablet Take 1 tablet by mouth Daily As Needed (leg swelling). 90 tablet 0   • ketoconazole (NIZORAL) 2 % cream APPLY CREAM TOPICALLY TO CORNERS OF MOUTH TWICE DAILY AS NEEDED     • metFORMIN ER (GLUCOPHAGE-XR) 500 MG 24 hr tablet Take 1 tablet by mouth 2 (Two) Times a Day. 180 tablet 3     No facility-administered medications prior to visit.     New Medications Ordered This Visit   Medications   • Semaglutide,0.25 or 0.5MG/DOS, (OZEMPIC) 2 MG/1.5ML solution pen-injector     Sig: Inject 0.5 mg under the skin into the appropriate area as directed 1 (One) Time Per Week. X 4 weeks then increase to 0.5mg sq weekly x 4 weeks     Dispense:  1.5 mL     Refill:  5   • glucose blood (Accu-Chek Mima Plus) test strip     Si each by Other route 2 (Two) Times a Day. Use as instructed     Dispense:  200 each     Refill:  3     [unfilled]  Medications Discontinued During This Encounter   Medication Reason   • metFORMIN ER (GLUCOPHAGE-XR) 500 MG 24 hr tablet *Therapy completed   • glucose blood (Accu-Chek Mima Plus) test strip Reorder   • Semaglutide,0.25 or 0.5MG/DOS, (OZEMPIC) 2 MG/1.5ML solution pen-injector Reorder         Return in about 3 months (around 2022) for Recheck, labs.    Patient was given instructions and counseling regarding her condition or for health maintenance advice. Please see specific information pulled into the AVS if appropriate.

## 2022-08-16 NOTE — PATIENT INSTRUCTIONS
Advance Care Planning and Advance Directives     You make decisions on a daily basis - decisions about where you want to live, your career, your home, your life. Perhaps one of the most important decisions you face is your choice for future medical care. Take time to talk with your family and your healthcare team and start planning today.  Advance Care Planning is a process that can help you:  Understand possible future healthcare decisions in light of your own experiences  Reflect on those decision in light of your goals and values  Discuss your decisions with those closest to you and the healthcare professionals that care for you  Make a plan by creating a document that reflects your wishes    Surrogate Decision Maker  In the event of a medical emergency, which has left you unable to communicate or to make your own decisions, you would need someone to make decisions for you.  It is important to discuss your preferences for medical treatment with this person while you are in good health.     Qualities of a surrogate decision maker:  Willing to take on this role and responsibility  Knows what you want for future medical care  Willing to follow your wishes even if they don't agree with them  Able to make difficult medical decisions under stressful circumstances    Advance Directives  These are legal documents you can create that will guide your healthcare team and decision maker(s) when needed. These documents can be stored in the electronic medical record.    Living Will - a legal document to guide your care if you have a terminal condition or a serious illness and are unable to communicate. States vary by statute in document names/types, but most forms may include one or more of the following:        -  Directions regarding life-prolonging treatments        -  Directions regarding artificially provided nutrition/hydration        -  Choosing a healthcare decision maker        -  Direction regarding organ/tissue  donation    Durable Power of  for Healthcare - this document names an -in-fact to make medical decisions for you, but it may also allow this person to make personal and financial decisions for you. Please seek the advice of an  if you need this type of document.    **Advance Directives are not required and no one may discriminate against you if you do not sign one.    Medical Orders  Many states allow specific forms/orders signed by your physician to record your wishes for medical treatment in your current state of health. This form, signed in personal communication with your physician, addresses resuscitation and other medical interventions that you may or may not want.      For more information or to schedule a time with a [facility] Advance Care Planning Facilitator contact: [facility] or call [phone] and someone will contact you directly.  Medicare Wellness  Personal Prevention Plan of Service     Date of Office Visit:    Encounter Provider:  Selena Davis MD  Place of Service:  [facility]  Patient Name: Lydia John  :  1945    As part of the Medicare Wellness portion of your visit today, we are providing you with this personalized preventive plan of services (PPPS). This plan is based upon recommendations of the [place] Preventive Services Task Force (USPSTF) and the Advisory Committee on Immunization Practices (ACIP).    This lists the preventive care services that should be considered, and provides dates of when you are due. Items listed as completed are up-to-date and do not require any further intervention.    Health Maintenance   Topic Date Due    TDAP/TD VACCINES (1 - Tdap) Never done    DXA SCAN  2018    MAMMOGRAM  2018    COVID-19 Vaccine (4 - Booster for Pfizer series) 2022    ZOSTER VACCINE (2 of 2) 2022 (Originally 2010)    INFLUENZA VACCINE  10/01/2022    HEMOGLOBIN A1C   11/16/2022    LIPID PANEL  05/16/2023    URINE MICROALBUMIN  05/16/2023    ANNUAL WELLNESS VISIT  08/16/2023    COLORECTAL CANCER SCREENING  04/30/2029    HEPATITIS C SCREENING  Completed    Pneumococcal Vaccine 65+  Completed    DIABETIC EYE EXAM  Discontinued       Orders Placed This Encounter   Procedures    Comprehensive Metabolic Panel     Order Specific Question:   Release to patient     Answer:   Immediate    Hemoglobin A1c    Lipid Panel With LDL / HDL Ratio    CBC & Differential     Order Specific Question:   Manual Differential     Answer:   No       Return in about 3 months (around 11/16/2022) for Recheck, labs.

## 2022-08-16 NOTE — PROGRESS NOTES
The ABCs of the Annual Wellness Visit  Subsequent Medicare Wellness Visit    Chief Complaint   Patient presents with   • Diabetes   • Medicare Wellness-subsequent      Subjective    History of Present Illness:  Lydia John is a 77 y.o. female who presents for a Subsequent Medicare Wellness Visit.    The following portions of the patient's history were reviewed and   updated as appropriate: allergies, current medications, past family history, past medical history, past social history, past surgical history and problem list.    Compared to one year ago, the patient feels her physical   health is the same.    Compared to one year ago, the patient feels her mental   health is the same.    Recent Hospitalizations:  She was not admitted to the hospital during the last year.       Current Medical Providers:  Patient Care Team:  Selena Davis MD as PCP - General (Internal Medicine & Pediatrics)  Nikolay Vazquez MD as Consulting Physician (Neurology)    Outpatient Medications Prior to Visit   Medication Sig Dispense Refill   • amitriptyline (ELAVIL) 75 MG tablet TAKE 1 TABLET BY MOUTH EVERY DAY AT BEDTIME 90 tablet 3   • calcium citrate-vitamin d (CITRACAL) 200-250 MG-UNIT tablet tablet Take 1 tablet by mouth daily.     • DULoxetine (CYMBALTA) 60 MG capsule Take 1 capsule by mouth once daily 90 capsule 1   • glimepiride (AMARYL) 4 MG tablet Take 1 tablet by mouth Every Morning Before Breakfast. 90 tablet 3   • glucose blood test strip Check blood sugar once daily 100 each 0   • hydrocortisone 2.5 % cream APPLY CREAM TO AFFECTED AREA TWICE DAILY FOR 14 DAYS     • ibuprofen (ADVIL,MOTRIN) 200 MG tablet Take 200 mg by mouth.     • omeprazole (priLOSEC) 20 MG capsule Take 1 capsule by mouth once daily 90 capsule 0   • rosuvastatin (Crestor) 5 MG tablet Take 1 tablet by mouth Daily. 90 tablet 3   • vitamin B-12 (CYANOCOBALAMIN) 100 MCG tablet Take 50 mcg by mouth daily.     • vitamin D (ERGOCALCIFEROL) 1.25 MG  (89370 UT) capsule capsule Take 1 capsule by mouth 1 (One) Time Per Week. 13 capsule 3   • zolpidem (AMBIEN) 5 MG tablet Take 1 tablet by mouth At Night As Needed for Sleep. 90 tablet 1   • glucose blood (Accu-Chek Mima Plus) test strip 1 each by Other route 2 (two) times a day. Use as instructed 200 each 3   • Semaglutide,0.25 or 0.5MG/DOS, (OZEMPIC) 2 MG/1.5ML solution pen-injector Inject 0.25 mg under the skin into the appropriate area as directed 1 (One) Time Per Week. X 4 weeks then increase to 0.5mg sq weekly x 4 weeks 1.5 mL 1   • furosemide (Lasix) 20 MG tablet Take 1 tablet by mouth Daily As Needed (leg swelling). 90 tablet 0   • ketoconazole (NIZORAL) 2 % cream APPLY CREAM TOPICALLY TO CORNERS OF MOUTH TWICE DAILY AS NEEDED     • metFORMIN ER (GLUCOPHAGE-XR) 500 MG 24 hr tablet Take 1 tablet by mouth 2 (Two) Times a Day. 180 tablet 3     No facility-administered medications prior to visit.       No opioid medication identified on active medication list. I have reviewed chart for other potential  high risk medication/s and harmful drug interactions in the elderly.          Aspirin is not on active medication list.  Aspirin use is not indicated based on review of current medical condition/s. Risk of harm outweighs potential benefits.  .    Patient Active Problem List   Diagnosis   • Peripheral polyneuropathy   • Type 2 diabetes mellitus (HCC)   • Left foot pain   • Post-menopausal   • Breast cancer screening   • Diabetic amyotrophy (HCC)   • Proximal limb muscle weakness   • Hyponatremia   • Situational depression   • Mixed hyperlipidemia   • Vitamin D deficiency   • Paraparesis (HCC)   • Weakness of lower extremity   • Muscle strain of right wrist     Advance Care Planning  Advance Directive is not on file.  ACP discussion was held with the patient during this visit. Patient has an advance directive (not in EMR), copy requested.          Objective    Vitals:    08/16/22 1507 08/16/22 1556   BP: 168/90  "132/84   Pulse: 79    Temp: 97.5 °F (36.4 °C)    SpO2: 98%    Weight: 79.6 kg (175 lb 6.4 oz)    Height: 152.4 cm (60\")      Estimated body mass index is 34.26 kg/m² as calculated from the following:    Height as of this encounter: 152.4 cm (60\").    Weight as of this encounter: 79.6 kg (175 lb 6.4 oz).    BMI is >= 30 and <35. (Class 1 Obesity). The following options were offered after discussion;: exercise counseling/recommendations and nutrition counseling/recommendations      Does the patient have evidence of cognitive impairment? No    Physical Exam            HEALTH RISK ASSESSMENT    Smoking Status:  Social History     Tobacco Use   Smoking Status Never Smoker   Smokeless Tobacco Never Used     Alcohol Consumption:  Social History     Substance and Sexual Activity   Alcohol Use No     Fall Risk Screen:    CHARADI Fall Risk Assessment was completed, and patient is at HIGH risk for falls. Assessment completed on:5/16/2022    Depression Screening:  PHQ-2/PHQ-9 Depression Screening 5/16/2022   Retired Total Score -   Little Interest or Pleasure in Doing Things 0-->not at all   Feeling Down, Depressed or Hopeless 0-->not at all   PHQ-9: Brief Depression Severity Measure Score 0       Health Habits and Functional and Cognitive Screening:  Functional & Cognitive Status 8/16/2022   Do you have difficulty preparing food and eating? No   Do you have difficulty bathing yourself, getting dressed or grooming yourself? No   Do you have difficulty using the toilet? No   Do you have difficulty moving around from place to place? No   Do you have trouble with steps or getting out of a bed or a chair? No   Current Diet Well Balanced Diet   Dental Exam Up to date   Eye Exam Up to date   Exercise (times per week) 0 times per week   Current Exercises Include No Regular Exercise   Current Exercise Activities Include -   Do you need help using the phone?  No   Are you deaf or do you have serious difficulty hearing?  No   Do you need " help with transportation? Yes   Do you need help shopping? Yes   Do you need help preparing meals?  Yes   Do you need help with housework?  Yes   Do you need help with laundry? Yes   Do you need help taking your medications? No   Do you need help managing money? No   Do you ever drive or ride in a car without wearing a seat belt? No   Have you felt unusual stress, anger or loneliness in the last month? No   Who do you live with? Spouse   If you need help, do you have trouble finding someone available to you? No   Have you been bothered in the last four weeks by sexual problems? No   Do you have difficulty concentrating, remembering or making decisions? No       Age-appropriate Screening Schedule:  Refer to the list below for future screening recommendations based on patient's age, sex and/or medical conditions. Orders for these recommended tests are listed in the plan section. The patient has been provided with a written plan.    Health Maintenance   Topic Date Due   • TDAP/TD VACCINES (1 - Tdap) Never done   • DXA SCAN  05/13/2018   • MAMMOGRAM  05/27/2018   • ZOSTER VACCINE (2 of 2) 08/16/2022 (Originally 4/8/2010)   • INFLUENZA VACCINE  10/01/2022   • HEMOGLOBIN A1C  11/16/2022   • LIPID PANEL  05/16/2023   • URINE MICROALBUMIN  05/16/2023   • DIABETIC EYE EXAM  Discontinued              Assessment & Plan   CMS Preventative Services Quick Reference  Risk Factors Identified During Encounter  Fall Risk-High or Moderate  Immunizations Discussed/Encouraged (specific Immunizations; Tdap, Shingrix and COVID19  Obesity/Overweight   Polypharmacy  The above risks/problems have been discussed with the patient.  Follow up actions/plans if indicated are seen below in the Assessment/Plan Section.  Pertinent information has been shared with the patient in the After Visit Summary.    Diagnoses and all orders for this visit:    1. Medicare annual wellness visit, subsequent (Primary)    2. 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 X 4 weeks then increase to 0.5mg sq weekly x 4 weeks  Dispense: 1.5 mL; Refill: 5    3. Type 2 diabetes mellitus with complication, without long-term current use of insulin (HCC)  -     Semaglutide,0.25 or 0.5MG/DOS, (OZEMPIC) 2 MG/1.5ML solution pen-injector; Inject 0.5 mg under the skin into the appropriate area as directed 1 (One) Time Per Week. X 4 weeks then increase to 0.5mg sq weekly x 4 weeks  Dispense: 1.5 mL; Refill: 5  -     glucose blood (Accu-Chek Mima Plus) test strip; 1 each by Other route 2 (Two) Times a Day. Use as instructed  Dispense: 200 each; Refill: 3    4. Type 2 diabetes mellitus with other specified complication, unspecified whether long term insulin use (HCC)    5. Type 2 diabetes mellitus with hypoglycemia without coma, without long-term current use of insulin (HCC)  -     Semaglutide,0.25 or 0.5MG/DOS, (OZEMPIC) 2 MG/1.5ML solution pen-injector; Inject 0.5 mg under the skin into the appropriate area as directed 1 (One) Time Per Week. X 4 weeks then increase to 0.5mg sq weekly x 4 weeks  Dispense: 1.5 mL; Refill: 5  -     glucose blood (Accu-Chek Mima Plus) test strip; 1 each by Other route 2 (Two) Times a Day. Use as instructed  Dispense: 200 each; Refill: 3  -     CBC & Differential  -     Comprehensive Metabolic Panel  -     Hemoglobin A1c  -     Lipid Panel With LDL / HDL Ratio    6. Sleep difficulties    7. Mixed hyperlipidemia    8. Essential hypertension    9. Gastroesophageal reflux disease, unspecified whether esophagitis present        Follow Up:   Return in about 3 months (around 11/16/2022) for Recheck, labs.     An After Visit Summary and PPPS were made available to the patient.

## 2022-08-17 LAB
ALBUMIN SERPL-MCNC: 4.3 G/DL (ref 3.7–4.7)
ALBUMIN/GLOB SERPL: 2 {RATIO} (ref 1.2–2.2)
ALP SERPL-CCNC: 115 IU/L (ref 44–121)
ALT SERPL-CCNC: 28 IU/L (ref 0–32)
AST SERPL-CCNC: 18 IU/L (ref 0–40)
BASOPHILS # BLD AUTO: 0 X10E3/UL (ref 0–0.2)
BASOPHILS NFR BLD AUTO: 0 %
BILIRUB SERPL-MCNC: <0.2 MG/DL (ref 0–1.2)
BUN SERPL-MCNC: 22 MG/DL (ref 8–27)
BUN/CREAT SERPL: 28 (ref 12–28)
CALCIUM SERPL-MCNC: 9.3 MG/DL (ref 8.7–10.3)
CHLORIDE SERPL-SCNC: 98 MMOL/L (ref 96–106)
CHOLEST SERPL-MCNC: 189 MG/DL (ref 100–199)
CO2 SERPL-SCNC: 21 MMOL/L (ref 20–29)
CREAT SERPL-MCNC: 0.78 MG/DL (ref 0.57–1)
EGFRCR-CYS SERPLBLD CKD-EPI 2021: 79 ML/MIN/1.73
EOSINOPHIL # BLD AUTO: 0.4 X10E3/UL (ref 0–0.4)
EOSINOPHIL NFR BLD AUTO: 4 %
ERYTHROCYTE [DISTWIDTH] IN BLOOD BY AUTOMATED COUNT: 14.3 % (ref 11.7–15.4)
GLOBULIN SER CALC-MCNC: 2.2 G/DL (ref 1.5–4.5)
GLUCOSE SERPL-MCNC: 176 MG/DL (ref 65–99)
HBA1C MFR BLD: 7.4 % (ref 4.8–5.6)
HCT VFR BLD AUTO: 37.8 % (ref 34–46.6)
HDLC SERPL-MCNC: 51 MG/DL
HGB BLD-MCNC: 12.3 G/DL (ref 11.1–15.9)
IMM GRANULOCYTES # BLD AUTO: 0.1 X10E3/UL (ref 0–0.1)
IMM GRANULOCYTES NFR BLD AUTO: 1 %
LDLC SERPL CALC-MCNC: 107 MG/DL (ref 0–99)
LDLC/HDLC SERPL: 2.1 RATIO (ref 0–3.2)
LYMPHOCYTES # BLD AUTO: 2.2 X10E3/UL (ref 0.7–3.1)
LYMPHOCYTES NFR BLD AUTO: 22 %
MCH RBC QN AUTO: 28.2 PG (ref 26.6–33)
MCHC RBC AUTO-ENTMCNC: 32.5 G/DL (ref 31.5–35.7)
MCV RBC AUTO: 87 FL (ref 79–97)
MONOCYTES # BLD AUTO: 0.6 X10E3/UL (ref 0.1–0.9)
MONOCYTES NFR BLD AUTO: 6 %
NEUTROPHILS # BLD AUTO: 6.7 X10E3/UL (ref 1.4–7)
NEUTROPHILS NFR BLD AUTO: 67 %
PLATELET # BLD AUTO: 278 X10E3/UL (ref 150–450)
POTASSIUM SERPL-SCNC: 4.7 MMOL/L (ref 3.5–5.2)
PROT SERPL-MCNC: 6.5 G/DL (ref 6–8.5)
RBC # BLD AUTO: 4.36 X10E6/UL (ref 3.77–5.28)
SODIUM SERPL-SCNC: 137 MMOL/L (ref 134–144)
TRIGL SERPL-MCNC: 177 MG/DL (ref 0–149)
VLDLC SERPL CALC-MCNC: 31 MG/DL (ref 5–40)
WBC # BLD AUTO: 10 X10E3/UL (ref 3.4–10.8)

## 2022-08-19 DIAGNOSIS — E78.2 MIXED HYPERLIPIDEMIA: ICD-10-CM

## 2022-08-19 DIAGNOSIS — E55.9 VITAMIN D DEFICIENCY: ICD-10-CM

## 2022-08-19 RX ORDER — ROSUVASTATIN CALCIUM 5 MG/1
TABLET, COATED ORAL
Qty: 90 TABLET | Refills: 0 | Status: SHIPPED | OUTPATIENT
Start: 2022-08-19 | End: 2022-11-02

## 2022-08-19 RX ORDER — ERGOCALCIFEROL 1.25 MG/1
CAPSULE ORAL
Qty: 13 CAPSULE | Refills: 0 | Status: SHIPPED | OUTPATIENT
Start: 2022-08-19 | End: 2022-11-21

## 2022-08-19 NOTE — TELEPHONE ENCOUNTER
Rx Refill Note  Requested Prescriptions     Pending Prescriptions Disp Refills   • vitamin D (ERGOCALCIFEROL) 1.25 MG (00491 UT) capsule capsule [Pharmacy Med Name: Vitamin D (Ergocalciferol) 1.25 MG (46989 UT) Oral Capsule] 13 capsule 0     Sig: Take 1 capsule by mouth once a week   • rosuvastatin (CRESTOR) 5 MG tablet [Pharmacy Med Name: Rosuvastatin Calcium 5 MG Oral Tablet] 90 tablet 0     Sig: Take 1 tablet by mouth once daily      Last office visit with prescribing clinician: 8/16/2022      Next office visit with prescribing clinician: 11/15/2022            Genie Vance MA  08/19/22, 09:49 EDT

## 2022-08-25 ENCOUNTER — TELEPHONE (OUTPATIENT)
Dept: INTERNAL MEDICINE | Facility: CLINIC | Age: 77
End: 2022-08-25

## 2022-08-25 NOTE — TELEPHONE ENCOUNTER
Pharmacy sent a fax informing us that the insurance will only cover once daily testing on the test strips unless the patient is on insulin. Are you okay with decreasing her testing to daily?

## 2022-08-27 DIAGNOSIS — E11.649 TYPE 2 DIABETES MELLITUS WITH HYPOGLYCEMIA WITHOUT COMA, WITHOUT LONG-TERM CURRENT USE OF INSULIN: ICD-10-CM

## 2022-08-27 DIAGNOSIS — E11.8 TYPE 2 DIABETES MELLITUS WITH COMPLICATION, WITHOUT LONG-TERM CURRENT USE OF INSULIN: ICD-10-CM

## 2022-10-05 DIAGNOSIS — E11.8 TYPE 2 DIABETES MELLITUS WITH COMPLICATION, WITHOUT LONG-TERM CURRENT USE OF INSULIN: ICD-10-CM

## 2022-10-05 RX ORDER — GLIMEPIRIDE 4 MG/1
4 TABLET ORAL
Qty: 90 TABLET | Refills: 3 | Status: SHIPPED | OUTPATIENT
Start: 2022-10-05

## 2022-10-05 RX ORDER — GLIMEPIRIDE 4 MG/1
TABLET ORAL
Qty: 90 TABLET | Refills: 0 | OUTPATIENT
Start: 2022-10-05

## 2022-10-31 DIAGNOSIS — E78.2 MIXED HYPERLIPIDEMIA: ICD-10-CM

## 2022-10-31 DIAGNOSIS — E11.44 DIABETIC AMYOTROPHY ASSOCIATED WITH TYPE 2 DIABETES MELLITUS: ICD-10-CM

## 2022-10-31 DIAGNOSIS — F43.21 SITUATIONAL DEPRESSION: ICD-10-CM

## 2022-11-02 RX ORDER — ROSUVASTATIN CALCIUM 5 MG/1
TABLET, COATED ORAL
Qty: 90 TABLET | Refills: 0 | Status: SHIPPED | OUTPATIENT
Start: 2022-11-02 | End: 2023-02-14

## 2022-11-02 RX ORDER — DULOXETIN HYDROCHLORIDE 60 MG/1
CAPSULE, DELAYED RELEASE ORAL
Qty: 90 CAPSULE | Refills: 0 | Status: SHIPPED | OUTPATIENT
Start: 2022-11-02 | End: 2023-02-14

## 2022-11-14 DIAGNOSIS — G47.9 SLEEP DIFFICULTIES: ICD-10-CM

## 2022-11-15 ENCOUNTER — OFFICE VISIT (OUTPATIENT)
Dept: INTERNAL MEDICINE | Facility: CLINIC | Age: 77
End: 2022-11-15

## 2022-11-15 VITALS
OXYGEN SATURATION: 97 % | HEIGHT: 60 IN | BODY MASS INDEX: 34.16 KG/M2 | HEART RATE: 79 BPM | SYSTOLIC BLOOD PRESSURE: 114 MMHG | DIASTOLIC BLOOD PRESSURE: 60 MMHG | WEIGHT: 174 LBS | TEMPERATURE: 97.3 F

## 2022-11-15 DIAGNOSIS — E11.69 TYPE 2 DIABETES MELLITUS WITH OTHER SPECIFIED COMPLICATION, UNSPECIFIED WHETHER LONG TERM INSULIN USE: ICD-10-CM

## 2022-11-15 DIAGNOSIS — K29.00 ACUTE GASTRITIS WITHOUT HEMORRHAGE, UNSPECIFIED GASTRITIS TYPE: ICD-10-CM

## 2022-11-15 DIAGNOSIS — E78.2 MIXED HYPERLIPIDEMIA: ICD-10-CM

## 2022-11-15 DIAGNOSIS — Z23 NEED FOR VACCINATION: Primary | ICD-10-CM

## 2022-11-15 DIAGNOSIS — E11.44 DIABETIC AMYOTROPHY ASSOCIATED WITH TYPE 2 DIABETES MELLITUS: ICD-10-CM

## 2022-11-15 PROCEDURE — 99214 OFFICE O/P EST MOD 30 MIN: CPT | Performed by: INTERNAL MEDICINE

## 2022-11-15 PROCEDURE — 90662 IIV NO PRSV INCREASED AG IM: CPT | Performed by: INTERNAL MEDICINE

## 2022-11-15 PROCEDURE — G0008 ADMIN INFLUENZA VIRUS VAC: HCPCS | Performed by: INTERNAL MEDICINE

## 2022-11-15 RX ORDER — OMEPRAZOLE 20 MG/1
20 CAPSULE, DELAYED RELEASE ORAL DAILY
Qty: 90 CAPSULE | Refills: 3 | Status: SHIPPED | OUTPATIENT
Start: 2022-11-15

## 2022-11-15 RX ORDER — ACETAMINOPHEN 160 MG/5ML
15 SOLUTION ORAL EVERY 4 HOURS PRN
COMMUNITY

## 2022-11-15 RX ORDER — ZOLPIDEM TARTRATE 5 MG/1
5 TABLET ORAL NIGHTLY PRN
Qty: 90 TABLET | Refills: 0 | Status: SHIPPED | OUTPATIENT
Start: 2022-11-15 | End: 2023-02-14

## 2022-11-15 NOTE — PROGRESS NOTES
Lydia John is a 77 y.o. female, who presents with a chief complaint of   Chief Complaint   Patient presents with   • Diabetes     F/U           HPI   Here with her daughter for follow-up.      Right wrist pain-Struggling with right wrist pain since march. XR wnl. Wearing wrist brace which has helped but discomfort that has still been persistent.  Duration of at least 6 mos now. Pt is using voltaren gel and wearing her brace.  She doesn't want surgery and doesn't want to do OT right now.       DM2- Last a1c 9.9. On glimiperide and ozempic. no recent hypoglycemia.   Her new glucometer is harder to use so she doesn't check glucoses as much. She is trying to limit her carb intake.      HLD-Continues on crestor, no myaglias or cramps.      HTN-Blood pressure high in office , repots some dizziness last week that has since improved. No headaches.      Diabetic amyotrophy - Continues to improve, walking with walker. PT and family feel she's not strong enough to use only a cane at this time.      Sleep - Continues to take melatonin nightly. Intermittently uses ambien.      GERD- gerd has been worse since on ozempic.  She had to increase her omeprazole to bid dosing.     The following portions of the patient's history were reviewed and updated as appropriate: allergies, current medications, past family history, past medical history, past social history, past surgical history and problem list.    Allergies: Patient has no known allergies.    Review of Systems   Constitutional: Negative.    HENT: Negative.    Eyes: Negative.    Respiratory: Negative.    Cardiovascular: Negative.    Gastrointestinal: Negative.    Endocrine: Negative.    Genitourinary: Negative.    Musculoskeletal: Negative.    Skin: Negative.    Allergic/Immunologic: Negative.    Neurological: Negative.    Hematological: Negative.    Psychiatric/Behavioral: Negative.    All other systems reviewed and are negative.            Wt Readings from Last 3  Encounters:   11/15/22 78.9 kg (174 lb)   08/16/22 79.6 kg (175 lb 6.4 oz)   05/16/22 79.2 kg (174 lb 9.6 oz)     Temp Readings from Last 3 Encounters:   11/15/22 97.3 °F (36.3 °C) (Infrared)   08/16/22 97.5 °F (36.4 °C)   05/16/22 98 °F (36.7 °C) (Oral)     BP Readings from Last 3 Encounters:   11/15/22 114/60   08/16/22 132/84   05/16/22 128/86     Pulse Readings from Last 3 Encounters:   11/15/22 79   08/16/22 79   05/16/22 89     Body mass index is 33.98 kg/m².  SpO2 Readings from Last 3 Encounters:   11/15/22 97%   08/16/22 98%   05/16/22 99%          Physical Exam  Vitals and nursing note reviewed.   Constitutional:       General: She is not in acute distress.     Appearance: She is well-developed.   HENT:      Head: Normocephalic and atraumatic.      Right Ear: External ear normal.      Left Ear: External ear normal.      Nose: Nose normal.   Eyes:      Conjunctiva/sclera: Conjunctivae normal.      Pupils: Pupils are equal, round, and reactive to light.   Cardiovascular:      Rate and Rhythm: Normal rate and regular rhythm.      Heart sounds: Normal heart sounds.   Pulmonary:      Effort: Pulmonary effort is normal. No respiratory distress.      Breath sounds: Normal breath sounds. No wheezing.   Musculoskeletal:         General: Normal range of motion.      Cervical back: Normal range of motion and neck supple.      Comments: Normal gait   Skin:     General: Skin is warm and dry.   Neurological:      Mental Status: She is alert and oriented to person, place, and time.   Psychiatric:         Behavior: Behavior normal.         Thought Content: Thought content normal.         Judgment: Judgment normal.         Results for orders placed or performed in visit on 08/16/22   Comprehensive Metabolic Panel    Specimen: Blood   Result Value Ref Range    Glucose 176 (H) 65 - 99 mg/dL    BUN 22 8 - 27 mg/dL    Creatinine 0.78 0.57 - 1.00 mg/dL    EGFR Result 79 >59 mL/min/1.73    BUN/Creatinine Ratio 28 12 - 28     Sodium 137 134 - 144 mmol/L    Potassium 4.7 3.5 - 5.2 mmol/L    Chloride 98 96 - 106 mmol/L    Total CO2 21 20 - 29 mmol/L    Calcium 9.3 8.7 - 10.3 mg/dL    Total Protein 6.5 6.0 - 8.5 g/dL    Albumin 4.3 3.7 - 4.7 g/dL    Globulin 2.2 1.5 - 4.5 g/dL    A/G Ratio 2.0 1.2 - 2.2    Total Bilirubin <0.2 0.0 - 1.2 mg/dL    Alkaline Phosphatase 115 44 - 121 IU/L    AST (SGOT) 18 0 - 40 IU/L    ALT (SGPT) 28 0 - 32 IU/L   Hemoglobin A1c    Specimen: Blood   Result Value Ref Range    Hemoglobin A1C 7.4 (H) 4.8 - 5.6 %   Lipid Panel With LDL / HDL Ratio    Specimen: Blood   Result Value Ref Range    Total Cholesterol 189 100 - 199 mg/dL    Triglycerides 177 (H) 0 - 149 mg/dL    HDL Cholesterol 51 >39 mg/dL    VLDL Cholesterol Tyrell 31 5 - 40 mg/dL    LDL Chol Calc (NIH) 107 (H) 0 - 99 mg/dL    LDL/HDL RATIO 2.1 0.0 - 3.2 ratio   CBC & Differential    Specimen: Blood   Result Value Ref Range    WBC 10.0 3.4 - 10.8 x10E3/uL    RBC 4.36 3.77 - 5.28 x10E6/uL    Hemoglobin 12.3 11.1 - 15.9 g/dL    Hematocrit 37.8 34.0 - 46.6 %    MCV 87 79 - 97 fL    MCH 28.2 26.6 - 33.0 pg    MCHC 32.5 31.5 - 35.7 g/dL    RDW 14.3 11.7 - 15.4 %    Platelets 278 150 - 450 x10E3/uL    Neutrophil Rel % 67 Not Estab. %    Lymphocyte Rel % 22 Not Estab. %    Monocyte Rel % 6 Not Estab. %    Eosinophil Rel % 4 Not Estab. %    Basophil Rel % 0 Not Estab. %    Neutrophils Absolute 6.7 1.4 - 7.0 x10E3/uL    Lymphocytes Absolute 2.2 0.7 - 3.1 x10E3/uL    Monocytes Absolute 0.6 0.1 - 0.9 x10E3/uL    Eosinophils Absolute 0.4 0.0 - 0.4 x10E3/uL    Basophils Absolute 0.0 0.0 - 0.2 x10E3/uL    Immature Granulocyte Rel % 1 Not Estab. %    Immature Grans Absolute 0.1 0.0 - 0.1 x10E3/uL     Result Review :                  Assessment and Plan    Diagnoses and all orders for this visit:    1. Need for vaccination (Primary)  -     Fluzone High-Dose 65+yrs (5403-9689)    2. Type 2 diabetes mellitus with other specified complication, unspecified whether long term  insulin use (HCC)  -     CBC & Differential  -     Comprehensive Metabolic Panel  -     Hemoglobin A1c  -     Lipid Panel With LDL / HDL Ratio  -     T4, Free  -     TSH    3. Mixed hyperlipidemia  -     Comprehensive Metabolic Panel  -     Lipid Panel With LDL / HDL Ratio    4. 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     • amitriptyline (ELAVIL) 75 MG tablet TAKE 1 TABLET BY MOUTH EVERY DAY AT BEDTIME 90 tablet 3   • calcium citrate-vitamin d (CITRACAL) 200-250 MG-UNIT tablet tablet Take 1 tablet by mouth daily.     • DULoxetine (CYMBALTA) 60 MG capsule Take 1 capsule by mouth once daily 90 capsule 0   • glimepiride (AMARYL) 4 MG tablet Take 1 tablet by mouth Every Morning Before Breakfast. 90 tablet 3   • glucose blood (Accu-Chek Mima Plus) test strip 1 each by Other route Daily. Use as instructed 200 each 3   • glucose blood test strip Check blood sugar once daily 100 each 0   • hydrocortisone 2.5 % cream APPLY CREAM TO AFFECTED AREA TWICE DAILY FOR 14 DAYS     • ketoconazole (NIZORAL) 2 % cream APPLY CREAM TOPICALLY TO CORNERS OF MOUTH TWICE DAILY AS NEEDED     • omeprazole (priLOSEC) 20 MG capsule Take 1 capsule by mouth once daily 90 capsule 0   • rosuvastatin (CRESTOR) 5 MG tablet Take 1 tablet by mouth once daily 90 tablet 0   • Semaglutide,0.25 or 0.5MG/DOS, (OZEMPIC) 2 MG/1.5ML solution pen-injector Inject 0.5 mg under the skin into the appropriate area as directed 1 (One) Time Per Week. X 4 weeks then increase to 0.5mg sq weekly x 4 weeks 1.5 mL 5   • vitamin B-12 (CYANOCOBALAMIN) 100 MCG tablet Take 50 mcg by mouth daily.     •  vitamin D (ERGOCALCIFEROL) 1.25 MG (30293 UT) capsule capsule Take 1 capsule by mouth once a week 13 capsule 0   • zolpidem (AMBIEN) 5 MG tablet Take 1 tablet by mouth At Night As Needed for Sleep. 90 tablet 1   • furosemide (Lasix) 20 MG tablet Take 1 tablet by mouth Daily As Needed (leg swelling). 90 tablet 0   • ibuprofen (ADVIL,MOTRIN) 200 MG tablet Take 200 mg by mouth.       No facility-administered medications prior to visit.     No orders of the defined types were placed in this encounter.    [unfilled]  There are no discontinued medications.      Return in about 3 months (around 2/15/2023) for Recheck, labs.    Patient was given instructions and counseling regarding her condition or for health maintenance advice. Please see specific information pulled into the AVS if appropriate.

## 2022-11-15 NOTE — TELEPHONE ENCOUNTER
Rx Refill Note  Requested Prescriptions     Pending Prescriptions Disp Refills    zolpidem (AMBIEN) 5 MG tablet [Pharmacy Med Name: Zolpidem Tartrate 5 MG Oral Tablet] 90 tablet 0     Sig: Take 1 tablet by mouth At Night As Needed for Sleep.      Last office visit with prescribing clinician: 8/16/2022      Next office visit with prescribing clinician: 11/15/2022            Genie Vance MA  11/15/22, 10:47 EST

## 2022-11-16 LAB
ALBUMIN SERPL-MCNC: 4.3 G/DL (ref 3.5–5.2)
ALBUMIN/GLOB SERPL: 2.2 G/DL
ALP SERPL-CCNC: 118 U/L (ref 39–117)
ALT SERPL-CCNC: 31 U/L (ref 1–33)
AST SERPL-CCNC: 25 U/L (ref 1–32)
BASOPHILS # BLD AUTO: 0.02 10*3/MM3 (ref 0–0.2)
BASOPHILS NFR BLD AUTO: 0.2 % (ref 0–1.5)
BILIRUB SERPL-MCNC: <0.2 MG/DL (ref 0–1.2)
BUN SERPL-MCNC: 23 MG/DL (ref 8–23)
BUN/CREAT SERPL: 28.8 (ref 7–25)
CALCIUM SERPL-MCNC: 9.6 MG/DL (ref 8.6–10.5)
CHLORIDE SERPL-SCNC: 96 MMOL/L (ref 98–107)
CHOLEST SERPL-MCNC: 197 MG/DL (ref 0–200)
CO2 SERPL-SCNC: 25 MMOL/L (ref 22–29)
CREAT SERPL-MCNC: 0.8 MG/DL (ref 0.57–1)
EGFRCR SERPLBLD CKD-EPI 2021: 76.5 ML/MIN/1.73
EOSINOPHIL # BLD AUTO: 0.28 10*3/MM3 (ref 0–0.4)
EOSINOPHIL NFR BLD AUTO: 2.9 % (ref 0.3–6.2)
ERYTHROCYTE [DISTWIDTH] IN BLOOD BY AUTOMATED COUNT: 14.1 % (ref 12.3–15.4)
GLOBULIN SER CALC-MCNC: 2 GM/DL
GLUCOSE SERPL-MCNC: 187 MG/DL (ref 65–99)
HBA1C MFR BLD: 9.5 % (ref 4.8–5.6)
HCT VFR BLD AUTO: 40.7 % (ref 34–46.6)
HDLC SERPL-MCNC: 49 MG/DL (ref 40–60)
HGB BLD-MCNC: 13.4 G/DL (ref 12–15.9)
IMM GRANULOCYTES # BLD AUTO: 0.05 10*3/MM3 (ref 0–0.05)
IMM GRANULOCYTES NFR BLD AUTO: 0.5 % (ref 0–0.5)
LDLC SERPL CALC-MCNC: 112 MG/DL (ref 0–100)
LDLC/HDLC SERPL: 2.18 {RATIO}
LYMPHOCYTES # BLD AUTO: 2.5 10*3/MM3 (ref 0.7–3.1)
LYMPHOCYTES NFR BLD AUTO: 25.7 % (ref 19.6–45.3)
MCH RBC QN AUTO: 28 PG (ref 26.6–33)
MCHC RBC AUTO-ENTMCNC: 32.9 G/DL (ref 31.5–35.7)
MCV RBC AUTO: 85.1 FL (ref 79–97)
MONOCYTES # BLD AUTO: 0.59 10*3/MM3 (ref 0.1–0.9)
MONOCYTES NFR BLD AUTO: 6.1 % (ref 5–12)
NEUTROPHILS # BLD AUTO: 6.3 10*3/MM3 (ref 1.7–7)
NEUTROPHILS NFR BLD AUTO: 64.6 % (ref 42.7–76)
NRBC BLD AUTO-RTO: 0 /100 WBC (ref 0–0.2)
PLATELET # BLD AUTO: 280 10*3/MM3 (ref 140–450)
POTASSIUM SERPL-SCNC: 4.6 MMOL/L (ref 3.5–5.2)
PROT SERPL-MCNC: 6.3 G/DL (ref 6–8.5)
RBC # BLD AUTO: 4.78 10*6/MM3 (ref 3.77–5.28)
SODIUM SERPL-SCNC: 133 MMOL/L (ref 136–145)
T4 FREE SERPL-MCNC: 1.17 NG/DL (ref 0.93–1.7)
TRIGL SERPL-MCNC: 205 MG/DL (ref 0–150)
TSH SERPL DL<=0.005 MIU/L-ACNC: 3.08 UIU/ML (ref 0.27–4.2)
VLDLC SERPL CALC-MCNC: 36 MG/DL (ref 5–40)
WBC # BLD AUTO: 9.74 10*3/MM3 (ref 3.4–10.8)

## 2022-11-21 DIAGNOSIS — E55.9 VITAMIN D DEFICIENCY: ICD-10-CM

## 2022-11-21 RX ORDER — ERGOCALCIFEROL 1.25 MG/1
CAPSULE ORAL
Qty: 13 CAPSULE | Refills: 0 | Status: SHIPPED | OUTPATIENT
Start: 2022-11-21

## 2022-11-21 NOTE — TELEPHONE ENCOUNTER
Rx Refill Note  Requested Prescriptions     Pending Prescriptions Disp Refills    vitamin D (ERGOCALCIFEROL) 1.25 MG (02993 UT) capsule capsule [Pharmacy Med Name: Vitamin D (Ergocalciferol) 1.25 MG (13767 UT) Oral Capsule] 13 capsule 0     Sig: Take 1 capsule by mouth once a week      Last office visit with prescribing clinician: 11/15/2022      Next office visit with prescribing clinician: 2/28/2023            Genie Vance MA  11/21/22, 13:11 EST

## 2022-12-05 ENCOUNTER — TELEPHONE (OUTPATIENT)
Dept: INTERNAL MEDICINE | Facility: CLINIC | Age: 77
End: 2022-12-05

## 2022-12-05 NOTE — TELEPHONE ENCOUNTER
----- Message from Selena Davis MD sent at 12/2/2022  5:48 PM EST -----  Call pt about labs.  Hemoglobin A1c elevated at 9.5.  Increase glimepiride to 4 mg 2 times per day.

## 2023-01-17 RX ORDER — SEMAGLUTIDE 1.34 MG/ML
1 INJECTION, SOLUTION SUBCUTANEOUS WEEKLY
Qty: 3 ML | Refills: 0 | Status: SHIPPED | OUTPATIENT
Start: 2023-01-17 | End: 2023-01-25 | Stop reason: SDUPTHER

## 2023-01-25 DIAGNOSIS — E11.69 TYPE 2 DIABETES MELLITUS WITH OTHER SPECIFIED COMPLICATION, UNSPECIFIED WHETHER LONG TERM INSULIN USE: Primary | ICD-10-CM

## 2023-01-25 RX ORDER — SEMAGLUTIDE 1.34 MG/ML
1 INJECTION, SOLUTION SUBCUTANEOUS WEEKLY
Qty: 3 ML | Refills: 0 | Status: SHIPPED | OUTPATIENT
Start: 2023-01-25

## 2023-02-11 DIAGNOSIS — G47.9 SLEEP DIFFICULTIES: ICD-10-CM

## 2023-02-11 DIAGNOSIS — E11.44 DIABETIC AMYOTROPHY ASSOCIATED WITH TYPE 2 DIABETES MELLITUS: ICD-10-CM

## 2023-02-11 DIAGNOSIS — F43.21 SITUATIONAL DEPRESSION: ICD-10-CM

## 2023-02-11 DIAGNOSIS — E78.2 MIXED HYPERLIPIDEMIA: ICD-10-CM

## 2023-02-14 RX ORDER — DULOXETIN HYDROCHLORIDE 60 MG/1
CAPSULE, DELAYED RELEASE ORAL
Qty: 90 CAPSULE | Refills: 0 | Status: SHIPPED | OUTPATIENT
Start: 2023-02-14

## 2023-02-14 RX ORDER — ROSUVASTATIN CALCIUM 5 MG/1
TABLET, COATED ORAL
Qty: 90 TABLET | Refills: 0 | Status: SHIPPED | OUTPATIENT
Start: 2023-02-14

## 2023-02-14 RX ORDER — ZOLPIDEM TARTRATE 5 MG/1
5 TABLET ORAL NIGHTLY PRN
Qty: 90 TABLET | Refills: 0 | Status: SHIPPED | OUTPATIENT
Start: 2023-02-14

## 2023-02-14 NOTE — TELEPHONE ENCOUNTER
Rx Refill Note  Requested Prescriptions     Pending Prescriptions Disp Refills    zolpidem (AMBIEN) 5 MG tablet [Pharmacy Med Name: Zolpidem Tartrate 5 MG Oral Tablet] 90 tablet 0     Sig: TAKE 1 TABLET BY MOUTH AT NIGHT AS NEEDED FOR SLEEP     Signed Prescriptions Disp Refills    DULoxetine (CYMBALTA) 60 MG capsule 90 capsule 0     Sig: Take 1 capsule by mouth once daily     Authorizing Provider: JESSI MADDEN     Ordering User: AN VANCE    rosuvastatin (CRESTOR) 5 MG tablet 90 tablet 0     Sig: Take 1 tablet by mouth once daily     Authorizing Provider: JESSI MADDEN     Ordering User: AN VANCE      Last office visit with prescribing clinician: 11/15/2022   Last telemedicine visit with prescribing clinician: 2/28/2023   Next office visit with prescribing clinician: 2/28/2023                         Would you like a call back once the refill request has been completed: [] Yes [] No    If the office needs to give you a call back, can they leave a voicemail: [] Yes [] No    An Vance MA  02/14/23, 10:40 EST

## 2023-02-28 ENCOUNTER — OFFICE VISIT (OUTPATIENT)
Dept: INTERNAL MEDICINE | Facility: CLINIC | Age: 78
End: 2023-02-28
Payer: MEDICARE

## 2023-02-28 VITALS
HEIGHT: 60 IN | BODY MASS INDEX: 33.38 KG/M2 | WEIGHT: 170 LBS | TEMPERATURE: 97.8 F | RESPIRATION RATE: 18 BRPM | SYSTOLIC BLOOD PRESSURE: 128 MMHG | OXYGEN SATURATION: 99 % | HEART RATE: 71 BPM | DIASTOLIC BLOOD PRESSURE: 72 MMHG

## 2023-02-28 DIAGNOSIS — I10 ESSENTIAL HYPERTENSION: ICD-10-CM

## 2023-02-28 DIAGNOSIS — E11.69 TYPE 2 DIABETES MELLITUS WITH OTHER SPECIFIED COMPLICATION, UNSPECIFIED WHETHER LONG TERM INSULIN USE: Primary | ICD-10-CM

## 2023-02-28 DIAGNOSIS — E11.8 TYPE 2 DIABETES MELLITUS WITH COMPLICATION, WITHOUT LONG-TERM CURRENT USE OF INSULIN: ICD-10-CM

## 2023-02-28 DIAGNOSIS — Z78.0 POST-MENOPAUSAL: ICD-10-CM

## 2023-02-28 DIAGNOSIS — Z12.11 COLON CANCER SCREENING: ICD-10-CM

## 2023-02-28 DIAGNOSIS — E78.2 MIXED HYPERLIPIDEMIA: ICD-10-CM

## 2023-02-28 DIAGNOSIS — Z12.31 ENCOUNTER FOR SCREENING MAMMOGRAM FOR MALIGNANT NEOPLASM OF BREAST: ICD-10-CM

## 2023-02-28 PROCEDURE — 99214 OFFICE O/P EST MOD 30 MIN: CPT | Performed by: INTERNAL MEDICINE

## 2023-02-28 RX ORDER — LANCETS
1 EACH MISCELLANEOUS 2 TIMES DAILY
Qty: 200 EACH | Refills: 4 | Status: SHIPPED | OUTPATIENT
Start: 2023-02-28

## 2023-02-28 NOTE — PROGRESS NOTES
Lydia John is a 77 y.o. female, who presents with a chief complaint of   Chief Complaint   Patient presents with   • Hyperlipidemia     3 month follow up           HPI   Here with her daughter for follow-up.      Right wrist pain-Struggling with right wrist pain since march. XR wnl. Wearing wrist brace which has helped but discomfort that has still been persistent.  Duration of at least 6 mos now. Pt is using voltaren gel and wearing her brace.  She doesn't want surgery and doesn't want to do OT right now.       DM2- Last a1c 9.5. On glimiperide and ozempic. she is taking the ozempic 0.5mg and had problems finding the med at the pharmacy.  no recent hypoglycemia.  She is trying to limit her carb intake.  She is worried her a1c is much higher.       HLD-Continues on crestor, no myaglias or cramps.      HTN-Blood pressure high in office , repots some dizziness last week that has since improved. No headaches.      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 Cont duloxetine.  PT and family feel she's not strong enough to use only a cane at this time.      Sleep - Continues to take melatonin nightly. Intermittently uses ambien.      GERD- gerd has been worse since on ozempic.  She had to increase her omeprazole to bid dosing.       The following portions of the patient's history were reviewed and updated as appropriate: allergies, current medications, past family history, past medical history, past social history, past surgical history and problem list.    Allergies: Patient has no known allergies.    Review of Systems   Constitutional: Negative.    HENT: Negative.    Eyes: Negative.    Respiratory: Negative.    Cardiovascular: Negative.    Gastrointestinal: Negative.    Endocrine: Negative.    Genitourinary: Negative.    Musculoskeletal: Negative.    Skin: Negative.    Allergic/Immunologic: Negative.    Neurological: Negative.    Hematological: Negative.    Psychiatric/Behavioral:  Negative.    All other systems reviewed and are negative.            Wt Readings from Last 3 Encounters:   02/28/23 77.1 kg (170 lb)   11/15/22 78.9 kg (174 lb)   08/16/22 79.6 kg (175 lb 6.4 oz)     Temp Readings from Last 3 Encounters:   02/28/23 97.8 °F (36.6 °C) (Infrared)   11/15/22 97.3 °F (36.3 °C) (Infrared)   08/16/22 97.5 °F (36.4 °C)     BP Readings from Last 3 Encounters:   02/28/23 128/72   11/15/22 114/60   08/16/22 132/84     Pulse Readings from Last 3 Encounters:   02/28/23 71   11/15/22 79   08/16/22 79     Body mass index is 33.2 kg/m².  SpO2 Readings from Last 3 Encounters:   02/28/23 99%   11/15/22 97%   08/16/22 98%          Physical Exam  Vitals and nursing note reviewed.   Constitutional:       General: She is not in acute distress.     Appearance: She is well-developed.   HENT:      Head: Normocephalic and atraumatic.      Right Ear: External ear normal.      Left Ear: External ear normal.      Nose: Nose normal.   Eyes:      Conjunctiva/sclera: Conjunctivae normal.      Pupils: Pupils are equal, round, and reactive to light.   Cardiovascular:      Rate and Rhythm: Normal rate and regular rhythm.      Heart sounds: Normal heart sounds.   Pulmonary:      Effort: Pulmonary effort is normal. No respiratory distress.      Breath sounds: Normal breath sounds. No wheezing.   Musculoskeletal:         General: Normal range of motion.      Cervical back: Normal range of motion and neck supple.      Comments: Normal gait   Skin:     General: Skin is warm and dry.   Neurological:      Mental Status: She is alert and oriented to person, place, and time.   Psychiatric:         Behavior: Behavior normal.         Thought Content: Thought content normal.         Judgment: Judgment normal.         Results for orders placed or performed in visit on 11/15/22   Comprehensive Metabolic Panel    Specimen: Blood   Result Value Ref Range    Glucose 187 (H) 65 - 99 mg/dL    BUN 23 8 - 23 mg/dL    Creatinine 0.80 0.57  - 1.00 mg/dL    EGFR Result 76.5 >60.0 mL/min/1.73    BUN/Creatinine Ratio 28.8 (H) 7.0 - 25.0    Sodium 133 (L) 136 - 145 mmol/L    Potassium 4.6 3.5 - 5.2 mmol/L    Chloride 96 (L) 98 - 107 mmol/L    Total CO2 25.0 22.0 - 29.0 mmol/L    Calcium 9.6 8.6 - 10.5 mg/dL    Total Protein 6.3 6.0 - 8.5 g/dL    Albumin 4.30 3.50 - 5.20 g/dL    Globulin 2.0 gm/dL    A/G Ratio 2.2 g/dL    Total Bilirubin <0.2 0.0 - 1.2 mg/dL    Alkaline Phosphatase 118 (H) 39 - 117 U/L    AST (SGOT) 25 1 - 32 U/L    ALT (SGPT) 31 1 - 33 U/L   Hemoglobin A1c    Specimen: Blood   Result Value Ref Range    Hemoglobin A1C 9.50 (H) 4.80 - 5.60 %   Lipid Panel With LDL / HDL Ratio    Specimen: Blood   Result Value Ref Range    Total Cholesterol 197 0 - 200 mg/dL    Triglycerides 205 (H) 0 - 150 mg/dL    HDL Cholesterol 49 40 - 60 mg/dL    VLDL Cholesterol Tyrell 36 5 - 40 mg/dL    LDL Chol Calc (NIH) 112 (H) 0 - 100 mg/dL    LDL/HDL RATIO 2.18    T4, Free    Specimen: Blood   Result Value Ref Range    Free T4 1.17 0.93 - 1.70 ng/dL   TSH    Specimen: Blood   Result Value Ref Range    TSH 3.080 0.270 - 4.200 uIU/mL   CBC & Differential    Specimen: Blood   Result Value Ref Range    WBC 9.74 3.40 - 10.80 10*3/mm3    RBC 4.78 3.77 - 5.28 10*6/mm3    Hemoglobin 13.4 12.0 - 15.9 g/dL    Hematocrit 40.7 34.0 - 46.6 %    MCV 85.1 79.0 - 97.0 fL    MCH 28.0 26.6 - 33.0 pg    MCHC 32.9 31.5 - 35.7 g/dL    RDW 14.1 12.3 - 15.4 %    Platelets 280 140 - 450 10*3/mm3    Neutrophil Rel % 64.6 42.7 - 76.0 %    Lymphocyte Rel % 25.7 19.6 - 45.3 %    Monocyte Rel % 6.1 5.0 - 12.0 %    Eosinophil Rel % 2.9 0.3 - 6.2 %    Basophil Rel % 0.2 0.0 - 1.5 %    Neutrophils Absolute 6.30 1.70 - 7.00 10*3/mm3    Lymphocytes Absolute 2.50 0.70 - 3.10 10*3/mm3    Monocytes Absolute 0.59 0.10 - 0.90 10*3/mm3    Eosinophils Absolute 0.28 0.00 - 0.40 10*3/mm3    Basophils Absolute 0.02 0.00 - 0.20 10*3/mm3    Immature Granulocyte Rel % 0.5 0.0 - 0.5 %    Immature Grans Absolute  0.05 0.00 - 0.05 10*3/mm3    nRBC 0.0 0.0 - 0.2 /100 WBC     Result Review :                  Assessment and Plan    Diagnoses and all orders for this visit:    1. Type 2 diabetes mellitus with other specified complication, unspecified whether long term insulin use (HCC) (Primary)  -     glucose blood (Accu-Chek Guide) test strip; 1 each by Other route 2 (Two) Times a Day. Use as instructed  Dispense: 200 each; Refill: 4  -     Accu-Chek Softclix Lancets lancets; 1 each by Other route 2 (Two) Times a Day. Use as instructed  Dispense: 200 each; Refill: 4  -     CBC & Differential  -     Comprehensive Metabolic Panel  -     Hemoglobin A1c  -     Lipid Panel With LDL / HDL Ratio  -     Microalbumin / Creatinine Urine Ratio - Urine, Clean Catch    2. Type 2 diabetes mellitus with complication, without long-term current use of insulin (HCC)  -     glucose blood (Accu-Chek Guide) test strip; 1 each by Other route 2 (Two) Times a Day. Use as instructed  Dispense: 200 each; Refill: 4  -     Accu-Chek Softclix Lancets lancets; 1 each by Other route 2 (Two) Times a Day. Use as instructed  Dispense: 200 each; Refill: 4  -     CBC & Differential  -     Comprehensive Metabolic Panel  -     Hemoglobin A1c  -     Lipid Panel With LDL / HDL Ratio  -     Microalbumin / Creatinine Urine Ratio - Urine, Clean Catch    3. Mixed hyperlipidemia  -     CBC & Differential  -     Comprehensive Metabolic Panel  -     Hemoglobin A1c  -     Lipid Panel With LDL / HDL Ratio  -     Microalbumin / Creatinine Urine Ratio - Urine, Clean Catch    4. Essential hypertension  -     CBC & Differential  -     Comprehensive Metabolic Panel  -     Hemoglobin A1c  -     Lipid Panel With LDL / HDL Ratio  -     Microalbumin / Creatinine Urine Ratio - Urine, Clean Catch    5. Encounter for screening mammogram for malignant neoplasm of breast  -     Mammo screening digital tomosynthesis bilateral w CAD; Future    6. Colon cancer screening  -     Cologuard -  Stool, Per Rectum; Future    7. Post-menopausal  -     DEXA Bone Density Axial; Future               Outpatient Medications Prior to Visit   Medication Sig Dispense Refill   • acetaminophen (TYLENOL) 160 MG/5ML solution Take 15 mg/kg by mouth Every 4 (Four) Hours As Needed for Mild Pain.     • calcium citrate-vitamin d (CITRACAL) 200-250 MG-UNIT tablet tablet Take 1 tablet by mouth Daily.     • DULoxetine (CYMBALTA) 60 MG capsule Take 1 capsule by mouth once daily 90 capsule 0   • glimepiride (AMARYL) 4 MG tablet Take 1 tablet by mouth Every Morning Before Breakfast. 90 tablet 3   • hydrocortisone 2.5 % cream APPLY CREAM TO AFFECTED AREA TWICE DAILY FOR 14 DAYS     • ibuprofen (ADVIL,MOTRIN) 200 MG tablet Take 1 tablet by mouth.     • ketoconazole (NIZORAL) 2 % cream APPLY CREAM TOPICALLY TO CORNERS OF MOUTH TWICE DAILY AS NEEDED     • omeprazole (priLOSEC) 20 MG capsule Take 1 capsule by mouth Daily. (Patient taking differently: Take 1 capsule by mouth 2 (Two) Times a Day.) 90 capsule 3   • rosuvastatin (CRESTOR) 5 MG tablet Take 1 tablet by mouth once daily 90 tablet 0   • Semaglutide, 1 MG/DOSE, (Ozempic, 1 MG/DOSE,) 2 MG/1.5ML solution pen-injector Inject 1 mg under the skin into the appropriate area as directed 1 (One) Time Per Week. 3 mL 0   • vitamin B-12 (CYANOCOBALAMIN) 100 MCG tablet Take 50 mcg by mouth daily.     • vitamin D (ERGOCALCIFEROL) 1.25 MG (30795 UT) capsule capsule Take 1 capsule by mouth once a week 13 capsule 0   • zolpidem (AMBIEN) 5 MG tablet TAKE 1 TABLET BY MOUTH AT NIGHT AS NEEDED FOR SLEEP 90 tablet 0   • amitriptyline (ELAVIL) 75 MG tablet TAKE 1 TABLET BY MOUTH EVERY DAY AT BEDTIME 90 tablet 3   • furosemide (Lasix) 20 MG tablet Take 1 tablet by mouth Daily As Needed (leg swelling). 90 tablet 0   • glucose blood (Accu-Chek Mima Plus) test strip 1 each by Other route Daily. Use as instructed 200 each 3   • glucose blood test strip Check blood sugar once daily 100 each 0     No  facility-administered medications prior to visit.     New Medications Ordered This Visit   Medications   • glucose blood (Accu-Chek Guide) test strip     Si each by Other route 2 (Two) Times a Day. Use as instructed     Dispense:  200 each     Refill:  4   • Accu-Chek Softclix Lancets lancets     Si each by Other route 2 (Two) Times a Day. Use as instructed     Dispense:  200 each     Refill:  4     [unfilled]  Medications Discontinued During This Encounter   Medication Reason   • amitriptyline (ELAVIL) 75 MG tablet    • furosemide (Lasix) 20 MG tablet    • glucose blood (Accu-Chek Mima Plus) test strip *Therapy completed   • glucose blood test strip          Return in about 3 months (around 2023) for labs, Recheck.    Patient was given instructions and counseling regarding her condition or for health maintenance advice. Please see specific information pulled into the AVS if appropriate.

## 2023-03-01 LAB
ALBUMIN SERPL-MCNC: 4.1 G/DL (ref 3.5–5.2)
ALBUMIN/CREAT UR: 26 MG/G CREAT (ref 0–29)
ALBUMIN/GLOB SERPL: 1.8 G/DL
ALP SERPL-CCNC: 110 U/L (ref 39–117)
ALT SERPL-CCNC: 15 U/L (ref 1–33)
AST SERPL-CCNC: 15 U/L (ref 1–32)
BASOPHILS # BLD AUTO: 0.02 10*3/MM3 (ref 0–0.2)
BASOPHILS NFR BLD AUTO: 0.2 % (ref 0–1.5)
BILIRUB SERPL-MCNC: <0.2 MG/DL (ref 0–1.2)
BUN SERPL-MCNC: 21 MG/DL (ref 8–23)
BUN/CREAT SERPL: 23.3 (ref 7–25)
CALCIUM SERPL-MCNC: 9.1 MG/DL (ref 8.6–10.5)
CHLORIDE SERPL-SCNC: 97 MMOL/L (ref 98–107)
CHOLEST SERPL-MCNC: 171 MG/DL (ref 0–200)
CO2 SERPL-SCNC: 26.5 MMOL/L (ref 22–29)
CREAT SERPL-MCNC: 0.9 MG/DL (ref 0.57–1)
CREAT UR-MCNC: 101.7 MG/DL
EGFRCR SERPLBLD CKD-EPI 2021: 66.4 ML/MIN/1.73
EOSINOPHIL # BLD AUTO: 0.29 10*3/MM3 (ref 0–0.4)
EOSINOPHIL NFR BLD AUTO: 3.4 % (ref 0.3–6.2)
ERYTHROCYTE [DISTWIDTH] IN BLOOD BY AUTOMATED COUNT: 14 % (ref 12.3–15.4)
GLOBULIN SER CALC-MCNC: 2.3 GM/DL
GLUCOSE SERPL-MCNC: 213 MG/DL (ref 65–99)
HBA1C MFR BLD: 8.5 % (ref 4.8–5.6)
HCT VFR BLD AUTO: 39.2 % (ref 34–46.6)
HDLC SERPL-MCNC: 54 MG/DL (ref 40–60)
HGB BLD-MCNC: 13 G/DL (ref 12–15.9)
IMM GRANULOCYTES # BLD AUTO: 0.03 10*3/MM3 (ref 0–0.05)
IMM GRANULOCYTES NFR BLD AUTO: 0.4 % (ref 0–0.5)
LDLC SERPL CALC-MCNC: 92 MG/DL (ref 0–100)
LDLC/HDLC SERPL: 1.64 {RATIO}
LYMPHOCYTES # BLD AUTO: 2.08 10*3/MM3 (ref 0.7–3.1)
LYMPHOCYTES NFR BLD AUTO: 24.3 % (ref 19.6–45.3)
MCH RBC QN AUTO: 28.1 PG (ref 26.6–33)
MCHC RBC AUTO-ENTMCNC: 33.2 G/DL (ref 31.5–35.7)
MCV RBC AUTO: 84.8 FL (ref 79–97)
MICROALBUMIN UR-MCNC: 26.7 UG/ML
MONOCYTES # BLD AUTO: 0.53 10*3/MM3 (ref 0.1–0.9)
MONOCYTES NFR BLD AUTO: 6.2 % (ref 5–12)
NEUTROPHILS # BLD AUTO: 5.61 10*3/MM3 (ref 1.7–7)
NEUTROPHILS NFR BLD AUTO: 65.5 % (ref 42.7–76)
NRBC BLD AUTO-RTO: 0 /100 WBC (ref 0–0.2)
PLATELET # BLD AUTO: 286 10*3/MM3 (ref 140–450)
POTASSIUM SERPL-SCNC: 4.6 MMOL/L (ref 3.5–5.2)
PROT SERPL-MCNC: 6.4 G/DL (ref 6–8.5)
RBC # BLD AUTO: 4.62 10*6/MM3 (ref 3.77–5.28)
SODIUM SERPL-SCNC: 135 MMOL/L (ref 136–145)
TRIGL SERPL-MCNC: 141 MG/DL (ref 0–150)
VLDLC SERPL CALC-MCNC: 25 MG/DL (ref 5–40)
WBC # BLD AUTO: 8.56 10*3/MM3 (ref 3.4–10.8)

## 2023-04-08 DIAGNOSIS — E55.9 VITAMIN D DEFICIENCY: ICD-10-CM

## 2023-04-08 RX ORDER — ERGOCALCIFEROL 1.25 MG/1
CAPSULE ORAL
Qty: 13 CAPSULE | Refills: 0 | Status: SHIPPED | OUTPATIENT
Start: 2023-04-08

## 2023-04-08 NOTE — TELEPHONE ENCOUNTER
Rx Refill Note  Requested Prescriptions     Pending Prescriptions Disp Refills    vitamin D (ERGOCALCIFEROL) 1.25 MG (98758 UT) capsule capsule [Pharmacy Med Name: Vitamin D (Ergocalciferol) 1.25 MG (69056 UT) Oral Capsule] 13 capsule 0     Sig: Take 1 capsule by mouth once a week      Last office visit with prescribing clinician: 2/28/2023   Last telemedicine visit with prescribing clinician: 6/5/2023   Next office visit with prescribing clinician: 6/5/2023                         Would you like a call back once the refill request has been completed: [] Yes [] No    If the office needs to give you a call back, can they leave a voicemail: [] Yes [] No    Mireya Moe MA  04/08/23, 11:05 EDT

## 2023-04-22 DIAGNOSIS — E11.44 DIABETIC AMYOTROPHY ASSOCIATED WITH TYPE 2 DIABETES MELLITUS: ICD-10-CM

## 2023-04-22 DIAGNOSIS — F43.21 SITUATIONAL DEPRESSION: ICD-10-CM

## 2023-04-22 DIAGNOSIS — G47.9 SLEEP DIFFICULTIES: ICD-10-CM

## 2023-04-22 DIAGNOSIS — E78.2 MIXED HYPERLIPIDEMIA: ICD-10-CM

## 2023-04-24 RX ORDER — ZOLPIDEM TARTRATE 5 MG/1
5 TABLET ORAL NIGHTLY PRN
Qty: 90 TABLET | Refills: 1 | Status: SHIPPED | OUTPATIENT
Start: 2023-04-24

## 2023-04-24 RX ORDER — ROSUVASTATIN CALCIUM 5 MG/1
TABLET, COATED ORAL
Qty: 90 TABLET | Refills: 3 | Status: SHIPPED | OUTPATIENT
Start: 2023-04-24

## 2023-04-24 RX ORDER — DULOXETIN HYDROCHLORIDE 60 MG/1
60 CAPSULE, DELAYED RELEASE ORAL DAILY
Qty: 90 CAPSULE | Refills: 3 | Status: SHIPPED | OUTPATIENT
Start: 2023-04-24

## 2023-04-24 NOTE — TELEPHONE ENCOUNTER
Rx Refill Note  Requested Prescriptions     Pending Prescriptions Disp Refills   • rosuvastatin (CRESTOR) 5 MG tablet [Pharmacy Med Name: Rosuvastatin Calcium 5 MG Oral Tablet] 90 tablet 0     Sig: Take 1 tablet by mouth once daily   • DULoxetine (CYMBALTA) 60 MG capsule [Pharmacy Med Name: DULoxetine HCl 60 MG Oral Capsule Delayed Release Particles] 90 capsule 0     Sig: Take 1 capsule by mouth once daily   • zolpidem (AMBIEN) 5 MG tablet 90 tablet 0     Sig: Take 1 tablet by mouth At Night As Needed for Sleep.      Last office visit with prescribing clinician: 2/28/2023   Last telemedicine visit with prescribing clinician:    Next office visit with prescribing clinician: 6/5/2023                         Would you like a call back once the refill request has been completed: [] Yes [] No    If the office needs to give you a call back, can they leave a voicemail: [] Yes [] No    Ro Chauhan  04/24/23, 15:33 EDT

## 2023-06-05 DIAGNOSIS — E11.8 TYPE 2 DIABETES MELLITUS WITH COMPLICATION, WITHOUT LONG-TERM CURRENT USE OF INSULIN: ICD-10-CM

## 2023-06-05 DIAGNOSIS — E13.44: ICD-10-CM

## 2023-06-05 DIAGNOSIS — E11.649 TYPE 2 DIABETES MELLITUS WITH HYPOGLYCEMIA WITHOUT COMA, WITHOUT LONG-TERM CURRENT USE OF INSULIN: ICD-10-CM

## 2023-06-08 RX ORDER — SEMAGLUTIDE 1.34 MG/ML
INJECTION, SOLUTION SUBCUTANEOUS
Qty: 2 ML | Refills: 0 | OUTPATIENT
Start: 2023-06-08

## 2023-08-02 ENCOUNTER — TREATMENT (OUTPATIENT)
Dept: PHYSICAL THERAPY | Facility: CLINIC | Age: 78
End: 2023-08-02
Payer: MEDICARE

## 2023-08-02 DIAGNOSIS — R42 VERTIGO: ICD-10-CM

## 2023-08-02 DIAGNOSIS — H81.12 BPPV (BENIGN PAROXYSMAL POSITIONAL VERTIGO), LEFT: Primary | ICD-10-CM

## 2023-08-07 ENCOUNTER — TREATMENT (OUTPATIENT)
Dept: PHYSICAL THERAPY | Facility: CLINIC | Age: 78
End: 2023-08-07
Payer: MEDICARE

## 2023-08-07 DIAGNOSIS — H81.12 BPPV (BENIGN PAROXYSMAL POSITIONAL VERTIGO), LEFT: Primary | ICD-10-CM

## 2023-08-07 DIAGNOSIS — R42 VERTIGO: ICD-10-CM

## 2023-08-07 NOTE — PROGRESS NOTES
Physical Therapy Daily Treatment Note  [facility] [place]   [address], [address]  [place] [address]  P: [phone]  F: [phone]    Patient: Lydia John   : 1945  Referring practitioner: Selena Davis*  Date of Initial Visit: Type: THERAPY  Noted: 2023  Today's Date: 2023  Patient seen for 2 sessions       Visit Diagnoses:    ICD-10-CM ICD-9-CM   1. BPPV (benign paroxysmal positional vertigo), left  H81.12 386.11   2. Vertigo  R42 780.4         Subjective:  Lydia John reports: I felt worse the evening after my initial visit.  I feel that I am better now, with instances since my initial visit.  I went to my daughter's house the evening after initial visit and I fell going up the steps, while holding the rail.  I have some bruising on my arms and tore my skin on the (R) arm.      Pt was accompanied by her  today in the clinic.        Objective   See Exercise, Manual, and Modality Logs for complete treatment.     See Vestibular Section of Logs for testing and Rx.    S/P Rx provided pt was escorted to a chair with CGA to a full seated position.  Pt sat for 10 mins with head stationary.      Pt not to lay down or do activities that change head level beyond 45 degrees from upright until laying down for bed for the day.        Assessment:  The pt continues to demo (L) side involvement.  Her required help concerns me for giving her a HEP with maneuvers.       Pt did have some bleeding of her arm during the maneuver that we bandaged with a gauze and LF coflex.  Instructing the pt to re-dress when she got home with polysporin and bandage.       Plan:   Re-assess upon next visit for continued BPPV symptoms and treat appropriately.           Timed:         Manual Therapy:         mins  40890;     Therapeutic Exercise:         mins  97928;     Neuromuscular Pablo:        mins  55920;    Therapeutic Activity:     15     mins  64647;     Gait Training:            mins  67822;     Ultrasound:          mins  76119;    Ionto                                   mins  41184  Self Care                            mins  42008  Traction          mins 01174      Un-Timed:  Canalith Repos    15     mins 14465  Electrical Stimulation:         mins  16142 ( );  Dry Needling          mins self-pay  Traction          mins 98048        Timed Treatment:   15   mins   Total Treatment:     40   mins    Nikolay Richter, PT  [place] License #: [id number]    Physical Therapist

## 2023-08-16 ENCOUNTER — TREATMENT (OUTPATIENT)
Dept: PHYSICAL THERAPY | Facility: CLINIC | Age: 78
End: 2023-08-16
Payer: MEDICARE

## 2023-08-16 DIAGNOSIS — H81.12 BPPV (BENIGN PAROXYSMAL POSITIONAL VERTIGO), LEFT: Primary | ICD-10-CM

## 2023-08-16 DIAGNOSIS — R42 VERTIGO: ICD-10-CM

## 2023-08-16 NOTE — PROGRESS NOTES
Physical Therapy Daily Treatment Note  [facility] [place]   [address], [address]  [place] [address]  P: [phone]  F: [phone]    Patient: Lydia John   : 1945  Referring practitioner: Selena Davis*  Date of Initial Visit: Type: THERAPY  Noted: 2023  Today's Date: 2023  Patient seen for 3 sessions       Visit Diagnoses:    ICD-10-CM ICD-9-CM   1. BPPV (benign paroxysmal positional vertigo), left  H81.12 386.11   2. Vertigo  R42 780.4         Subjective:  Lydia John reports: I was really nausea again after my last visit.   No vertigo symptoms since my last visit.      Pt was accompanied by her Daughter, Agnes, today in the clinic.      Objective   See Exercise, Manual, and Modality Logs for complete treatment.     See Vestibular Section of Logs for testing and Rx.    S/P Rx provided pt was escorted to a chair with CGA to a full seated position.  Pt sat for 10 mins with head stationary.      Pt not to lay down or do activities that change head level beyond 45 degrees from upright until laying down for bed for the day.        Assessment:  The pt seems to be doing better but I was suspect there is still a present of loose crystals in the ears that would show with testing.  Because of the continues (L) side involvement will attempt for pt to perform at home on her own with assistance of a family member (daughter or ).    Reviewed the HEP of the Epley with the pt and her daughter including giving her a handout with instructions.        Plan:   Re-assess upon next visit for continued BPPV symptoms and treat appropriately.           Timed:         Manual Therapy:         mins  21281;     Therapeutic Exercise:         mins  16478;     Neuromuscular Pablo:        mins  13616;    Therapeutic Activity:     15     mins  81224;     Gait Training:           mins  47025;     Ultrasound:          mins  99598;    Ionto                                    mins  00015  Self Care                            mins  57639  Traction          mins 20843      Un-Timed:  Canalith Repos    15     mins 39931  Electrical Stimulation:         mins  61264 ( );  Dry Needling          mins self-pay  Traction          mins 05743        Timed Treatment:   15   mins   Total Treatment:     40   mins    Nikolay Richter, PT  [place] License #: [id number]    Physical Therapist

## 2023-08-18 ENCOUNTER — HOSPITAL ENCOUNTER (OUTPATIENT)
Dept: BONE DENSITY | Facility: HOSPITAL | Age: 78
Discharge: HOME | End: 2023-08-18
Payer: MEDICARE

## 2023-08-18 ENCOUNTER — HOSPITAL ENCOUNTER (OUTPATIENT)
Dept: MAMMOGRAPHY | Facility: HOSPITAL | Age: 78
Discharge: HOME | End: 2023-08-18
Payer: MEDICARE

## 2023-08-18 DIAGNOSIS — Z12.31 ENCOUNTER FOR SCREENING MAMMOGRAM FOR MALIGNANT NEOPLASM OF BREAST: ICD-10-CM

## 2023-08-18 DIAGNOSIS — Z78.0 POST-MENOPAUSAL: ICD-10-CM

## 2023-08-18 PROCEDURE — 77063 BREAST TOMOSYNTHESIS BI: CPT

## 2023-08-18 PROCEDURE — 77080 DXA BONE DENSITY AXIAL: CPT

## 2023-08-18 PROCEDURE — 77067 SCR MAMMO BI INCL CAD: CPT

## 2023-08-22 NOTE — PROGRESS NOTES
Called and spoke with patients daughter brad Alarcon per the verbal, and informed her of these results. She stated her understanding of the findings

## 2023-08-23 ENCOUNTER — TREATMENT (OUTPATIENT)
Dept: PHYSICAL THERAPY | Facility: CLINIC | Age: 78
End: 2023-08-23
Payer: MEDICARE

## 2023-08-23 DIAGNOSIS — H81.12 BPPV (BENIGN PAROXYSMAL POSITIONAL VERTIGO), LEFT: Primary | ICD-10-CM

## 2023-08-23 DIAGNOSIS — R42 VERTIGO: ICD-10-CM

## 2023-08-23 NOTE — PROGRESS NOTES
Physical Therapy Daily Treatment Note  [facility] [place]   [address], [address]  [place] [address]  P: [phone]  F: [phone]    Patient: Lydia John   : 1945  Referring practitioner: Selena Davis*  Date of Initial Visit: Type: THERAPY  Noted: 2023  Today's Date: 2023  Patient seen for 4 sessions       Visit Diagnoses:    ICD-10-CM ICD-9-CM   1. BPPV (benign paroxysmal positional vertigo), left  H81.12 386.11   2. Vertigo  R42 780.4         Subjective:  Lydia John reports: I did the HEP 1x day for about 3 days.  Overall I am better.  I have had no symptoms in the last 4 days.        Objective   See Exercise, Manual, and Modality Logs for complete treatment.     See Vestibular Section of Logs for testing and Rx.    S/P Rx provided pt was escorted to a chair with CGA to a full seated position.  Pt sat for 10 mins with head stationary.      Pt not to lay down or do activities that change head level beyond 45 degrees from upright until laying down for bed for the day.        Assessment:  The pt has less report of symptoms but still tests positive for (L) side involvement.  Reviewed the importance of doing the HEP daily to see sustained results.        Plan:   Continue with the (L) Epley at home.          Timed:         Manual Therapy:         mins  82374;     Therapeutic Exercise:         mins  60919;     Neuromuscular Pablo:        mins  39190;    Therapeutic Activity:     15     mins  23204;     Gait Training:           mins  90356;     Ultrasound:          mins  50971;    Ionto                                   mins  60688  Self Care                            mins  77067  Traction          mins 75940      Un-Timed:  Canalith Repos    15     mins 60972  Electrical Stimulation:         mins  69961 (MC );  Dry Needling          mins self-pay  Traction          mins 48048        Timed Treatment:   15   mins   Total Treatment:     40    cristal Richter, PT  [place] License #: [id number]    Physical Therapist

## 2023-08-28 ENCOUNTER — TELEPHONE (OUTPATIENT)
Dept: INTERNAL MEDICINE | Facility: CLINIC | Age: 78
End: 2023-08-28
Payer: MEDICARE

## 2023-08-29 ENCOUNTER — TELEPHONE (OUTPATIENT)
Dept: INTERNAL MEDICINE | Facility: CLINIC | Age: 78
End: 2023-08-29
Payer: MEDICARE

## 2023-08-29 NOTE — TELEPHONE ENCOUNTER
----- left voicemail to call back. Hub to relay -----  Call pt about dexa scan.  Dexa shows osteopenia with high risk of overall fracture and high risk of hip fracture.  Recommend reclast IV yearly.  Rec daily ca/vit D (1200mg calcium and 800iu vit d) and daily weight bearing exercises.  Repeat dexa scan in 2 years.

## 2023-08-30 ENCOUNTER — TREATMENT (OUTPATIENT)
Dept: PHYSICAL THERAPY | Facility: CLINIC | Age: 78
End: 2023-08-30
Payer: MEDICARE

## 2023-08-30 DIAGNOSIS — R42 VERTIGO: ICD-10-CM

## 2023-08-30 DIAGNOSIS — H81.12 BPPV (BENIGN PAROXYSMAL POSITIONAL VERTIGO), LEFT: Primary | ICD-10-CM

## 2023-08-30 NOTE — PROGRESS NOTES
Physical Therapy Daily Treatment Note  [facility]   [address], [address]  [place] [address]  P: [phone]  F: [phone]    Patient: Lydia John   : 1945  Referring practitioner: Selena Davis*  Date of Initial Visit: Type: THERAPY  Noted: 2023  Today's Date: 2023  Patient seen for 5 sessions       Visit Diagnoses:    ICD-10-CM ICD-9-CM   1. BPPV (benign paroxysmal positional vertigo), left  H81.12 386.11   2. Vertigo  R42 780.4         Subjective:  Lydia John reports: I felt very nausea after my last visit for several days, but on  evening I started to feel much better.  I have notice no vertigo symptoms since .  I did travel       Objective   See Exercise, Manual, and Modality Logs for complete treatment.     See Vestibular Section of Logs for testing and Rx.    S/P Rx provided pt was escorted to a chair with CGA to a full seated position.  Pt sat for 10 mins with head stationary.      Pt not to lay down or do activities that change head level beyond 45 degrees from upright until laying down for bed for the day.      Assessment:  The pt seems to have less symptoms upon testing.  Continued with (L) side [place] treatment.  The pt seemed to tolerate this week better.        Plan:   Re-assess upon next visit for continued BPPV symptoms and treat appropriately.           Timed:         Manual Therapy:         mins  27838;     Therapeutic Exercise:         mins  26388;     Neuromuscular Pablo:        mins  09433;    Therapeutic Activity:     15     mins  56413;     Gait Training:           mins  59260;     Ultrasound:          mins  70427;    Ionto                                   mins  27778  Self Care                            mins  14865  Traction          mins 57755      Un-Timed:  Canalith Repos    15     mins 67149  Electrical Stimulation:         mins  72347 ( );  Dry Needling          mins self-pay  Traction           mins 21068        Timed Treatment:   15   mins   Total Treatment:     40   mins    Nikolay Richter, PT  [place] License #: [id number]    Physical Therapist

## 2023-09-08 ENCOUNTER — TREATMENT (OUTPATIENT)
Dept: PHYSICAL THERAPY | Facility: CLINIC | Age: 78
End: 2023-09-08
Payer: MEDICARE

## 2023-09-08 DIAGNOSIS — H81.12 BPPV (BENIGN PAROXYSMAL POSITIONAL VERTIGO), LEFT: Primary | ICD-10-CM

## 2023-09-08 DIAGNOSIS — R42 VERTIGO: ICD-10-CM

## 2023-09-08 NOTE — PROGRESS NOTES
Physical Therapy Daily Treatment Note  [facility] [place]   [address], [address]  [place] [address]  P: [phone]  F: [phone]    Patient: Lydia John   : 1945  Referring practitioner: Selena Davis*  Date of Initial Visit: Type: THERAPY  Noted: 2023  Today's Date: 2023  Patient seen for 6 sessions       Visit Diagnoses:    ICD-10-CM ICD-9-CM   1. BPPV (benign paroxysmal positional vertigo), left  H81.12 386.11   2. Vertigo  R42 780.4         Subjective:  Lydia John reports: On Friday, 2 days after my last visit, I had a vertigo attack that made me dizziness, nausea, and flush feeling for 10 to 15 mins.  Again on  and Monday, but not since.  I was sitting still when it occurred.      I felt like was doing better until that Friday.         Objective   See Exercise, Manual, and Modality Logs for complete treatment.     See Vestibular Section of Logs for testing and Rx.    S/P Rx provided pt was escorted to a chair with CGA to a full seated position.  Pt sat for 10 mins with head stationary.      Pt not to lay down or do activities that change head level beyond 45 degrees from upright until laying down for bed for the day.        Assessment:  Pt has a down beating in every position of testing.  Presence of lateral canal involvement.        Plan:   Re-assess upon next visit for continued BPPV symptoms and treat appropriately.           Timed:         Manual Therapy:         mins  82106;     Therapeutic Exercise:         mins  42383;     Neuromuscular Pablo:        mins  39019;    Therapeutic Activity:     15     mins  99777;     Gait Training:           mins  32047;     Ultrasound:          mins  21723;    Ionto                                   mins  52073  Self Care                            mins  87158  Traction          mins 05653      Un-Timed:  Canalith Repos    15     mins 60606  Electrical Stimulation:         mins  88878 (  );  Dry Needling          mins self-pay  Traction          mins 99658        Timed Treatment:   15   mins   Total Treatment:     40   mins    Nikolay Richter, PT  [place] License #: [id number]    Physical Therapist

## 2023-09-13 ENCOUNTER — TREATMENT (OUTPATIENT)
Dept: PHYSICAL THERAPY | Facility: CLINIC | Age: 78
End: 2023-09-13
Payer: MEDICARE

## 2023-09-13 DIAGNOSIS — R42 VERTIGO: ICD-10-CM

## 2023-09-13 DIAGNOSIS — H81.12 BPPV (BENIGN PAROXYSMAL POSITIONAL VERTIGO), LEFT: Primary | ICD-10-CM

## 2023-09-13 PROCEDURE — 97530 THERAPEUTIC ACTIVITIES: CPT | Performed by: PHYSICAL THERAPIST

## 2023-09-13 NOTE — PROGRESS NOTES
Physical Therapy Daily Treatment Note  [facility] [place]   [address], [address]  [place] [address]  P: [phone]  F: [phone]    Patient: Lydia John   : 1945  Referring practitioner: Selena Davis*  Date of Initial Visit: Type: THERAPY  Noted: 2023  Today's Date: 2023  Patient seen for 7 sessions       Visit Diagnoses:    ICD-10-CM ICD-9-CM   1. BPPV (benign paroxysmal positional vertigo), left  H81.12 386.11   2. Vertigo  R42 780.4         Subjective:  Lydia John reports: I am doing good.  I have had no vertigo since my last visit.  I have been doing the HEP      Objective   See Exercise, Manual, and Modality Logs for complete treatment.       Assessment:  Pt has no vertigo symptoms with positional testing using the infrared goggles. Reviewed the re-occurrence percentages.  Skilled care is no longer indicated at this time.  There is a continued down beating nystagmus that is likely central nervous system related, no Tx required.      Plan:   DC to HEP.  Pt instructed to call with questions or issues related to her vertigo.             Timed:         Manual Therapy:         mins  17701;     Therapeutic Exercise:         mins  65935;     Neuromuscular Pablo:        mins  28278;    Therapeutic Activity:     15     mins  06231;     Gait Training:           mins  85417;     Ultrasound:          mins  80719;    Ionto                                   mins  62563  Self Care                            mins  11554  Traction          mins 83847      Un-Timed:  Canalith Repos         mins 81665  Electrical Stimulation:         mins  19727 ( );  Dry Needling          mins self-pay  Traction          mins 12184        Timed Treatment:   15   mins   Total Treatment:     15   mins    Nikolay Richter PT  [place] License #: [id number]    Physical Therapist

## 2023-09-27 DIAGNOSIS — E55.9 VITAMIN D DEFICIENCY: ICD-10-CM

## 2023-09-27 RX ORDER — ERGOCALCIFEROL 1.25 MG/1
50000 CAPSULE ORAL WEEKLY
Qty: 13 CAPSULE | Refills: 0 | Status: SHIPPED | OUTPATIENT
Start: 2023-09-27

## 2023-11-06 ENCOUNTER — OFFICE VISIT (OUTPATIENT)
Dept: INTERNAL MEDICINE | Facility: CLINIC | Age: 78
End: 2023-11-06
Payer: MEDICARE

## 2023-11-06 VITALS
OXYGEN SATURATION: 97 % | WEIGHT: 170.5 LBS | HEART RATE: 102 BPM | DIASTOLIC BLOOD PRESSURE: 98 MMHG | SYSTOLIC BLOOD PRESSURE: 160 MMHG | BODY MASS INDEX: 33.47 KG/M2 | TEMPERATURE: 98.2 F | HEIGHT: 60 IN

## 2023-11-06 DIAGNOSIS — E78.2 MIXED HYPERLIPIDEMIA: ICD-10-CM

## 2023-11-06 DIAGNOSIS — Z91.89 HIGH RISK FOR HIP FRACTURE: ICD-10-CM

## 2023-11-06 DIAGNOSIS — Z23 NEED FOR VACCINATION: ICD-10-CM

## 2023-11-06 DIAGNOSIS — I10 ESSENTIAL HYPERTENSION: ICD-10-CM

## 2023-11-06 DIAGNOSIS — M85.80 OSTEOPENIA, UNSPECIFIED LOCATION: ICD-10-CM

## 2023-11-06 DIAGNOSIS — E11.8 TYPE 2 DIABETES MELLITUS WITH COMPLICATION, WITHOUT LONG-TERM CURRENT USE OF INSULIN: ICD-10-CM

## 2023-11-06 DIAGNOSIS — E13.44: ICD-10-CM

## 2023-11-06 DIAGNOSIS — Z00.00 MEDICARE ANNUAL WELLNESS VISIT, SUBSEQUENT: Primary | ICD-10-CM

## 2023-11-06 LAB
EXPIRATION DATE: NORMAL
Lab: NORMAL
POC CREATININE URINE: 200
POC MICROALBUMIN URINE: 30

## 2023-11-06 NOTE — PROGRESS NOTES
The ABCs of the Annual Wellness Visit  Subsequent Medicare Wellness Visit    Subjective    Lydia John is a 78 y.o. female who presents for a Subsequent Medicare Wellness Visit.    The following portions of the patient's history were reviewed and   updated as appropriate: allergies, current medications, past family history, past medical history, past social history, past surgical history, and problem list.    Compared to one year ago, the patient feels her physical   health is the same.    Compared to one year ago, the patient feels her mental   health is the same.    Recent Hospitalizations:  She was not admitted to the hospital during the last year.       Current Medical Providers:  Patient Care Team:  Selena Davis MD as PCP - General (Internal Medicine & Pediatrics)  Nikolay Vazquez MD as Consulting Physician (Neurology)    Outpatient Medications Prior to Visit   Medication Sig Dispense Refill    acetaminophen (TYLENOL) 160 MG/5ML solution Take 15 mg/kg by mouth Every 4 (Four) Hours As Needed for Mild Pain.      amitriptyline (ELAVIL) 75 MG tablet Take 1 tablet by mouth Every Night. 90 tablet 3    calcium citrate-vitamin d (CITRACAL) 200-250 MG-UNIT tablet tablet Take 1 tablet by mouth Daily.      DULoxetine (CYMBALTA) 60 MG capsule Take 1 capsule by mouth once daily 90 capsule 3    glimepiride (AMARYL) 4 MG tablet Take 1 tablet by mouth Every Morning Before Breakfast. 90 tablet 3    ibuprofen (ADVIL,MOTRIN) 200 MG tablet Take 1 tablet by mouth.      omeprazole (priLOSEC) 20 MG capsule Take 1 capsule by mouth Daily. (Patient taking differently: Take 1 capsule by mouth 2 (Two) Times a Day.) 90 capsule 3    rosuvastatin (CRESTOR) 5 MG tablet Take 1 tablet by mouth once daily 90 tablet 3    Semaglutide, 1 MG/DOSE, (Ozempic, 1 MG/DOSE,) 4 MG/3ML solution pen-injector Inject 1 mg under the skin into the appropriate area as directed 1 (One) Time Per Week. 9 mL 3    vitamin B-12 (CYANOCOBALAMIN) 100  MCG tablet Take 0.5 tablets by mouth Daily.      vitamin D (ERGOCALCIFEROL) 1.25 MG (78888 UT) capsule capsule Take 1 capsule by mouth once a week 13 capsule 0    zolpidem (AMBIEN) 5 MG tablet Take 1 tablet by mouth At Night As Needed for Sleep. 90 tablet 1    Accu-Chek Softclix Lancets lancets 1 each by Other route 2 (Two) Times a Day. Use as instructed (Patient not taking: Reported on 7/3/2023) 200 each 4    glucose blood (Accu-Chek Guide) test strip 1 each by Other route 2 (Two) Times a Day. Use as instructed (Patient not taking: Reported on 7/3/2023) 200 each 4    hydrocortisone 2.5 % cream APPLY CREAM TO AFFECTED AREA TWICE DAILY FOR 14 DAYS (Patient not taking: Reported on 11/6/2023)      ketoconazole (NIZORAL) 2 % cream APPLY CREAM TOPICALLY TO CORNERS OF MOUTH TWICE DAILY AS NEEDED (Patient not taking: Reported on 11/6/2023)       No facility-administered medications prior to visit.       No opioid medication identified on active medication list. I have reviewed chart for other potential  high risk medication/s and harmful drug interactions in the elderly.        Aspirin is not on active medication list.  Aspirin use is not indicated based on review of current medical condition/s. Risk of harm outweighs potential benefits.  .    Patient Active Problem List   Diagnosis    Peripheral polyneuropathy    Type 2 diabetes mellitus    Left foot pain    Post-menopausal    Breast cancer screening    Diabetic amyotrophy    Proximal limb muscle weakness    Hyponatremia    Situational depression    Mixed hyperlipidemia    Vitamin D deficiency    Paraparesis    Weakness of lower extremity    Muscle strain of right wrist    Osteopenia    High risk for hip fracture     Advance Care Planning   Advance Care Planning     Advance Directive is not on file.  ACP discussion was held with the patient during this visit. Patient has an advance directive (not in EMR), copy requested.     Objective    Vitals:    11/06/23 1353   BP:  "160/98   BP Location: Left arm   Patient Position: Sitting   Cuff Size: Adult   Pulse: 102   Temp: 98.2 °F (36.8 °C)   TempSrc: Infrared   SpO2: 97%   Weight: 77.3 kg (170 lb 8 oz)   Height: 152.4 cm (60\")     Estimated body mass index is 33.3 kg/m² as calculated from the following:    Height as of this encounter: 152.4 cm (60\").    Weight as of this encounter: 77.3 kg (170 lb 8 oz).           Does the patient have evidence of cognitive impairment? No          HEALTH RISK ASSESSMENT    Smoking Status:  Social History     Tobacco Use   Smoking Status Never   Smokeless Tobacco Never     Alcohol Consumption:  Social History     Substance and Sexual Activity   Alcohol Use No     Fall Risk Screen:    CHARADI Fall Risk Assessment was completed, and patient is at MODERATE risk for falls. Assessment completed on:2023    Depression Screenin/6/2023     2:00 PM   PHQ-2/PHQ-9 Depression Screening   Little Interest or Pleasure in Doing Things 0-->not at all   Feeling Down, Depressed or Hopeless 0-->not at all   PHQ-9: Brief Depression Severity Measure Score 0       Health Habits and Functional and Cognitive Screenin/6/2023     2:00 PM   Functional & Cognitive Status   Do you have difficulty preparing food and eating? No   Do you have difficulty bathing yourself, getting dressed or grooming yourself? No   Do you have difficulty using the toilet? No   Do you have difficulty moving around from place to place? No   Do you have trouble with steps or getting out of a bed or a chair? No   Current Diet Low Carb Diet   Dental Exam Up to date   Eye Exam Up to date   Exercise (times per week) 0 times per week   Current Exercises Include No Regular Exercise   Do you need help using the phone?  No   Are you deaf or do you have serious difficulty hearing?  No   Do you need help to go to places out of walking distance? Yes   Do you need help shopping? Yes   Do you need help preparing meals?  Yes   Do you need help with " housework?  Yes   Do you need help with laundry? Yes   Do you need help taking your medications? No   Do you need help managing money? No   Do you ever drive or ride in a car without wearing a seat belt? No   Have you felt unusual stress, anger or loneliness in the last month? No   Who do you live with? Spouse   If you need help, do you have trouble finding someone available to you? No   Do you have difficulty concentrating, remembering or making decisions? No       Age-appropriate Screening Schedule:  Refer to the list below for future screening recommendations based on patient's age, sex and/or medical conditions. Orders for these recommended tests are listed in the plan section. The patient has been provided with a written plan.    Health Maintenance   Topic Date Due    TDAP/TD VACCINES (1 - Tdap) Never done    ZOSTER VACCINE (2 of 2) 11/06/2023 (Originally 4/8/2010)    COVID-19 Vaccine (4 - 2023-24 season) 01/26/2024 (Originally 9/1/2023)    HEMOGLOBIN A1C  01/03/2024    LIPID PANEL  07/03/2024    BMI FOLLOWUP  07/07/2024    ANNUAL WELLNESS VISIT  11/06/2024    URINE MICROALBUMIN  11/06/2024    DXA SCAN  08/18/2025    COLORECTAL CANCER SCREENING  04/30/2029    HEPATITIS C SCREENING  Completed    INFLUENZA VACCINE  Completed    Pneumococcal Vaccine 65+  Completed    DIABETIC EYE EXAM  Discontinued                  CMS Preventative Services Quick Reference  Risk Factors Identified During Encounter  Fall Risk-High or Moderate: Discussed Fall Prevention in the home  Immunizations Discussed/Encouraged: Influenza, Shingrix, and COVID19  Polypharmacy: Medication List reviewed and Medication changes were made  The above risks/problems have been discussed with the patient.  Pertinent information has been shared with the patient in the After Visit Summary.  An After Visit Summary and PPPS were made available to the patient.    Follow Up:   Next Medicare Wellness visit to be scheduled in 1 year.       Additional E&M Note  during same encounter follows:  Patient has multiple medical problems which are significant and separately identifiable that require additional work above and beyond the Medicare Wellness Visit.      Chief Complaint  Hyperlipidemia and Diabetes    Subjective        Hyperlipidemia    Diabetes      Lydia John is also being seen today for     Pt here with her daughter who helps provide history.      Pt has been having episodes of dizziness for about a month.  Sx began after pt got home from a trip to [place].  She also had an illness that could have been covid at the end of her trip.  Last week she couldn't lay on one side bc it made her more dizzy.  The sx wax and wane.  She has nausea.       T2DM - A1c 9.5 -> 8.5->10.2   She hasn't been checking her glucoses.  she needs a new meter bc the last one she got is really hard to use.  she is having trouble remembering her morning medications at least .      HLD - Continues on crestor, no myaglias or cramps.      HTN- Blood pressure high in office , repots some dizziness last week that has since improved. No headaches.      Diabetic amyotrophy - Continues to improve, walking with walker. Cont duloxetine and elavil     Sleep - Continues to take melatonin nightly. Intermittently uses ambien.      GERD- gerd has been worse since on ozempic.  She had to increase her omeprazole to bid dosing. she is forgetting the am dose fairly often.    Osteopenia - reviewed dexa results.  Pt   Review of Systems   Constitutional: Negative.    HENT: Negative.     Eyes: Negative.    Respiratory: Negative.     Cardiovascular: Negative.    Gastrointestinal: Negative.    Endocrine: Negative.    Musculoskeletal: Negative.    Skin: Negative.    Allergic/Immunologic: Negative.    Neurological: Negative.    Hematological: Negative.    Psychiatric/Behavioral: Negative.     All other systems reviewed and are negative.      Objective   Vital Signs:  /98 (BP Location: Left arm, Patient Position:  "Sitting, Cuff Size: Adult)   Pulse 102   Temp 98.2 °F (36.8 °C) (Infrared)   Ht 152.4 cm (60\")   Wt 77.3 kg (170 lb 8 oz)   SpO2 97%   BMI 33.30 kg/m²   Wt Readings from Last 3 Encounters:   11/06/23 77.3 kg (170 lb 8 oz)   07/03/23 78.5 kg (173 lb)   02/28/23 77.1 kg (170 lb)     Temp Readings from Last 3 Encounters:   11/06/23 98.2 °F (36.8 °C) (Infrared)   07/03/23 98 °F (36.7 °C) (Infrared)   02/28/23 97.8 °F (36.6 °C) (Infrared)     BP Readings from Last 3 Encounters:   11/06/23 160/98   07/03/23 144/90   02/28/23 128/72     Pulse Readings from Last 3 Encounters:   11/06/23 102   07/03/23 87   02/28/23 71       Physical Exam  Vitals and nursing note reviewed.   Constitutional:       General: She is not in acute distress.     Appearance: She is well-developed.   HENT:      Head: Normocephalic and atraumatic.      Right Ear: External ear normal.      Left Ear: External ear normal.      Nose: Nose normal.   Eyes:      Conjunctiva/sclera: Conjunctivae normal.      Pupils: Pupils are equal, round, and reactive to light.   Cardiovascular:      Rate and Rhythm: Normal rate and regular rhythm.      Heart sounds: Normal heart sounds.   Pulmonary:      Effort: Pulmonary effort is normal. No respiratory distress.      Breath sounds: Normal breath sounds. No wheezing.   Musculoskeletal:         General: Normal range of motion.      Cervical back: Normal range of motion and neck supple.      Comments: Normal gait   Skin:     General: Skin is warm and dry.   Neurological:      Mental Status: She is alert and oriented to person, place, and time.   Psychiatric:         Behavior: Behavior normal.         Thought Content: Thought content normal.         Judgment: Judgment normal.          The following data was reviewed by: Selena Davis MD on 11/06/2023:  Common labs          11/15/2022    15:16 2/28/2023    15:41 2/28/2023    15:47 7/3/2023    15:53   Common Labs   Glucose 187  213   317    BUN 23  21   20  "   Creatinine 0.80  0.90   0.86    Sodium 133  135   135    Potassium 4.6  4.6   4.7    Chloride 96  97   96    Calcium 9.6  9.1   9.5    Total Protein 6.3  6.4   6.2    Albumin 4.30  4.1   4.1    Total Bilirubin <0.2  <0.2   <0.2    Alkaline Phosphatase 118  110   97    AST (SGOT) 25  15   14    ALT (SGPT) 31  15   16    WBC 9.74  8.56   8.91    Hemoglobin 13.4  13.0   13.2    Hematocrit 40.7  39.2   40.4    Platelets 280  286   310    Total Cholesterol 197  171   189    Triglycerides 205  141   184    HDL Cholesterol 49  54   52    LDL Cholesterol  112  92   105    Hemoglobin A1C 9.50  8.50   10.20    Microalbumin, Urine   26.7  29.0                 Assessment and Plan   Diagnoses and all orders for this visit:    1. Medicare annual wellness visit, subsequent (Primary)    2. Need for vaccination  -     Fluzone High-Dose 65+yrs ([id number])    3. Type 2 diabetes mellitus with complication, without long-term current use of insulin - Pts A1c up to 10 and she has been missing doses of meds.  Her daughter makes meds in box.  Will try setting an alarm to help her remember meds.  Discussed other medication options to treat dm inculding oral and injection options.  Will cont same meds but see if we can get all of her doses in before making changes.    -     CBC & Differential  -     Comprehensive Metabolic Panel  -     Hemoglobin A1c  -     Lipid Panel With LDL / HDL Ratio  -     Cancel: T4, Free  -     Cancel: TSH  -     POC Microalbumin    4. 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 Essential hypertension  -     CBC & Differential  -     Comprehensive Metabolic Panel  -     Hemoglobin A1c  -     Lipid Panel With LDL / HDL Ratio    6. Mixed hyperlipidemia  -     Comprehensive Metabolic Panel  -     Lipid Panel With LDL / HDL Ratio    7. Osteopenia, unspecified location  -     Ambulatory Referral to [facility] For Infusion Treatment  -     denosumab (PROLIA) syringe 60 mg    8. High risk for hip  fracture    -     Ambulatory Referral to [facility] For Infusion Treatment  -     denosumab (PROLIA) syringe 60 mg             Follow Up   Return in about 3 months (around 2/6/2024) for Recheck, labs.  Patient was given instructions and counseling regarding her condition or for health maintenance advice. Please see specific information pulled into the AVS if appropriate.

## 2023-11-06 NOTE — PATIENT INSTRUCTIONS
Medicare Wellness  Personal Prevention Plan of Service     Date of Office Visit:    Encounter Provider:  Selena Davis MD  Place of Service:  [facility]  Patient Name: Lydia John  :  1945    As part of the Medicare Wellness portion of your visit today, we are providing you with this personalized preventive plan of services (PPPS). This plan is based upon recommendations of the [place] Preventive Services Task Force (USPSTF) and the Advisory Committee on Immunization Practices (ACIP).    This lists the preventive care services that should be considered, and provides dates of when you are due. Items listed as completed are up-to-date and do not require any further intervention.    Health Maintenance   Topic Date Due    TDAP/TD VACCINES (1 - Tdap) Never done    INFLUENZA VACCINE  2023    ZOSTER VACCINE (2 of 2) 2023 (Originally 2010)    COVID-19 Vaccine (4 - - season) 2024 (Originally 2023)    HEMOGLOBIN A1C  2024    LIPID PANEL  2024    URINE MICROALBUMIN  2024    BMI FOLLOWUP  2024    ANNUAL WELLNESS VISIT  2024    DXA SCAN  2025    COLORECTAL CANCER SCREENING  2029    HEPATITIS C SCREENING  Completed    Pneumococcal Vaccine 65+  Completed    DIABETIC EYE EXAM  Discontinued       Orders Placed This Encounter   Procedures    Fluzone High-Dose 65+yrs (1658-5869)    Comprehensive Metabolic Panel     Order Specific Question:   Release to patient     Answer:   Routine Release [[id number]]    Hemoglobin A1c     Order Specific Question:   Release to patient     Answer:   Routine Release [[id number]]    Lipid Panel With LDL / HDL Ratio     Order Specific Question:   Release to patient     Answer:   Routine Release [[id number]]    Ambulatory Referral to [facility] For Infusion Treatment     Referral Priority:   Routine     Referral Type:   Infusion     Referral Reason:   Specialty Services Required      Number of Visits Requested:   1    POC Microalbumin     Order Specific Question:   Release to patient     Answer:   Routine Release [[id number]]    CBC & Differential     Order Specific Question:   Manual Differential     Answer:   No     Order Specific Question:   Release to patient     Answer:   Routine Release [[id number]]       Return in about 3 months (around 2/6/2024) for Recheck, labs.

## 2023-11-07 LAB
ALBUMIN SERPL-MCNC: 4.5 G/DL (ref 3.5–5.2)
ALBUMIN/GLOB SERPL: 2 G/DL
ALP SERPL-CCNC: 115 U/L (ref 39–117)
ALT SERPL-CCNC: 20 U/L (ref 1–33)
AST SERPL-CCNC: 18 U/L (ref 1–32)
BASOPHILS # BLD AUTO: 0.02 10*3/MM3 (ref 0–0.2)
BASOPHILS NFR BLD AUTO: 0.2 % (ref 0–1.5)
BILIRUB SERPL-MCNC: 0.2 MG/DL (ref 0–1.2)
BUN SERPL-MCNC: 23 MG/DL (ref 8–23)
BUN/CREAT SERPL: 32.4 (ref 7–25)
CALCIUM SERPL-MCNC: 10.6 MG/DL (ref 8.6–10.5)
CHLORIDE SERPL-SCNC: 96 MMOL/L (ref 98–107)
CHOLEST SERPL-MCNC: 165 MG/DL (ref 0–200)
CO2 SERPL-SCNC: 27.5 MMOL/L (ref 22–29)
CREAT SERPL-MCNC: 0.71 MG/DL (ref 0.57–1)
EGFRCR SERPLBLD CKD-EPI 2021: 87.7 ML/MIN/1.73
EOSINOPHIL # BLD AUTO: 0.31 10*3/MM3 (ref 0–0.4)
EOSINOPHIL NFR BLD AUTO: 3.1 % (ref 0.3–6.2)
ERYTHROCYTE [DISTWIDTH] IN BLOOD BY AUTOMATED COUNT: 14.5 % (ref 12.3–15.4)
GLOBULIN SER CALC-MCNC: 2.2 GM/DL
GLUCOSE SERPL-MCNC: 250 MG/DL (ref 65–99)
HBA1C MFR BLD: 8.7 % (ref 4.8–5.6)
HCT VFR BLD AUTO: 40 % (ref 34–46.6)
HDLC SERPL-MCNC: 57 MG/DL (ref 40–60)
HGB BLD-MCNC: 13.3 G/DL (ref 12–15.9)
IMM GRANULOCYTES # BLD AUTO: 0.05 10*3/MM3 (ref 0–0.05)
IMM GRANULOCYTES NFR BLD AUTO: 0.5 % (ref 0–0.5)
LDLC SERPL CALC-MCNC: 79 MG/DL (ref 0–100)
LDLC/HDLC SERPL: 1.3 {RATIO}
LYMPHOCYTES # BLD AUTO: 2.18 10*3/MM3 (ref 0.7–3.1)
LYMPHOCYTES NFR BLD AUTO: 21.5 % (ref 19.6–45.3)
MCH RBC QN AUTO: 27.9 PG (ref 26.6–33)
MCHC RBC AUTO-ENTMCNC: 33.3 G/DL (ref 31.5–35.7)
MCV RBC AUTO: 84 FL (ref 79–97)
MONOCYTES # BLD AUTO: 0.59 10*3/MM3 (ref 0.1–0.9)
MONOCYTES NFR BLD AUTO: 5.8 % (ref 5–12)
NEUTROPHILS # BLD AUTO: 7.01 10*3/MM3 (ref 1.7–7)
NEUTROPHILS NFR BLD AUTO: 68.9 % (ref 42.7–76)
NRBC BLD AUTO-RTO: 0 /100 WBC (ref 0–0.2)
PLATELET # BLD AUTO: 285 10*3/MM3 (ref 140–450)
POTASSIUM SERPL-SCNC: 4.6 MMOL/L (ref 3.5–5.2)
PROT SERPL-MCNC: 6.7 G/DL (ref 6–8.5)
RBC # BLD AUTO: 4.76 10*6/MM3 (ref 3.77–5.28)
SODIUM SERPL-SCNC: 137 MMOL/L (ref 136–145)
TRIGL SERPL-MCNC: 169 MG/DL (ref 0–150)
VLDLC SERPL CALC-MCNC: 29 MG/DL (ref 5–40)
WBC # BLD AUTO: 10.16 10*3/MM3 (ref 3.4–10.8)

## 2023-11-10 DIAGNOSIS — E11.8 TYPE 2 DIABETES MELLITUS WITH COMPLICATION, WITHOUT LONG-TERM CURRENT USE OF INSULIN: ICD-10-CM

## 2023-11-10 DIAGNOSIS — G47.9 SLEEP DIFFICULTIES: ICD-10-CM

## 2023-11-13 RX ORDER — GLIMEPIRIDE 4 MG/1
4 TABLET ORAL
Qty: 90 TABLET | Refills: 3 | Status: SHIPPED | OUTPATIENT
Start: 2023-11-13

## 2023-11-13 RX ORDER — ZOLPIDEM TARTRATE 5 MG/1
5 TABLET ORAL NIGHTLY PRN
Qty: 90 TABLET | Refills: 1 | Status: SHIPPED | OUTPATIENT
Start: 2023-11-13

## 2023-11-13 NOTE — TELEPHONE ENCOUNTER
Rx Refill Note  Requested Prescriptions     Pending Prescriptions Disp Refills    zolpidem (AMBIEN) 5 MG tablet [Pharmacy Med Name: Zolpidem Tartrate 5 MG Oral Tablet] 90 tablet 0     Sig: Take 1 tablet by mouth At Night As Needed for Sleep.    glimepiride (AMARYL) 4 MG tablet [Pharmacy Med Name: Glimepiride 4 MG Oral Tablet] 90 tablet 0     Sig: TAKE 1 TABLET BY MOUTH ONCE DAILY IN THE MORNING BEFORE BREAKFAST      Last office visit with prescribing clinician: 11/6/2023   Last telemedicine visit with prescribing clinician: Visit date not found   Next office visit with prescribing clinician: 2/5/2024                         Would you like a call back once the refill request has been completed: [] Yes [] No    If the office needs to give you a call back, can they leave a voicemail: [] Yes [] No    Callie Graham MA  11/13/23, 07:34 EST

## 2023-11-27 ENCOUNTER — DOCUMENTATION (OUTPATIENT)
Dept: ONCOLOGY | Facility: HOSPITAL | Age: 78
End: 2023-11-27
Payer: MEDICARE

## 2023-11-28 ENCOUNTER — INFUSION (OUTPATIENT)
Dept: ONCOLOGY | Facility: HOSPITAL | Age: 78
End: 2023-11-28
Payer: MEDICARE

## 2023-11-28 VITALS — HEIGHT: 60 IN | RESPIRATION RATE: 15 BRPM | TEMPERATURE: 98.2 F | BODY MASS INDEX: 33.3 KG/M2

## 2023-11-28 DIAGNOSIS — M85.80 OSTEOPENIA, UNSPECIFIED LOCATION: ICD-10-CM

## 2023-11-28 DIAGNOSIS — Z91.89 HIGH RISK FOR HIP FRACTURE: Primary | ICD-10-CM

## 2023-11-28 PROCEDURE — 25010000002 DENOSUMAB 60 MG/ML SOLUTION PREFILLED SYRINGE: Performed by: INTERNAL MEDICINE

## 2023-11-28 PROCEDURE — 96372 THER/PROPH/DIAG INJ SC/IM: CPT

## 2023-11-28 RX ADMIN — DENOSUMAB 60 MG: 60 INJECTION SUBCUTANEOUS at 16:20

## 2023-11-28 NOTE — NURSING NOTE
Arrived  for prolia injection. Indication and side effects reviewed. Denies recent dental work. Labs and medications verified. Prolia administered in L arm without incidence. Instructed to call prescribing MD for any concerns or questions and instructed on how to schedule future appts.  Pt vu and discharged in stable condition.

## 2024-01-02 ENCOUNTER — INPATIENT HOSPITAL (OUTPATIENT)
Dept: URBAN - METROPOLITAN AREA HOSPITAL 107 | Facility: HOSPITAL | Age: 79
End: 2024-01-02

## 2024-01-02 DIAGNOSIS — R13.10 DYSPHAGIA, UNSPECIFIED: ICD-10-CM

## 2024-01-02 PROCEDURE — 99222 1ST HOSP IP/OBS MODERATE 55: CPT | Performed by: INTERNAL MEDICINE

## 2024-02-03 ENCOUNTER — READMISSION MANAGEMENT (OUTPATIENT)
Dept: CALL CENTER | Facility: HOSPITAL | Age: 79
End: 2024-02-03
Payer: MEDICARE

## 2024-02-04 NOTE — OUTREACH NOTE
Prep Survey      Flowsheet Row Responses   [facility] patient discharged from? Non-BH   Is LACE score < 7 ? Non-BH Discharge   Eligibility [facility] Selena Davis MD   Date of Discharge 02/03/24   Discharge diagnosis Unavailable   Does the patient have one of the following disease processes/diagnoses(primary or secondary)? Other   Prep survey completed? Yes            ELISSA CARR - Registered Nurse

## 2024-02-05 ENCOUNTER — TELEPHONE (OUTPATIENT)
Dept: INTERNAL MEDICINE | Facility: CLINIC | Age: 79
End: 2024-02-05

## 2024-02-05 ENCOUNTER — TRANSITIONAL CARE MANAGEMENT TELEPHONE ENCOUNTER (OUTPATIENT)
Dept: CALL CENTER | Facility: HOSPITAL | Age: 79
End: 2024-02-05
Payer: MEDICARE

## 2024-02-05 DIAGNOSIS — R30.0 DYSURIA: Primary | ICD-10-CM

## 2024-02-05 NOTE — OUTREACH NOTE
Call Center TCM Note      Flowsheet Row Responses   [facility] patient discharged from? Non-  [[facility]]   Does the patient have one of the following disease processes/diagnoses(primary or secondary)? Other   TCM attempt successful? Yes   Call start time 1129   Call end time 1136   Discharge diagnosis Dysphagia following cerebral infarction   Person spoke with today (if not patient) and relationship Daughter/Nicolasa   Is the patient taking all medications as directed (includes completed medication regime)? Yes   Medication comments States she started her on an antibiotic due to possible UTI.  She has gotten her 2nd dose.  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   Comments Daughter scheduled appt via [employer] but is only a reg 15 min office visit on 2/13.  Will see if they can change to a hospital f/u appt.   Does the patient have an appointment with their PCP within 7-14 days of discharge? Yes   What is the Home health agency?  Home Health   Has home health visited the patient within 72 hours of discharge? Yes   Psychosocial issues? No   Did the patient receive a copy of their discharge instructions? Yes   Nursing interventions Reviewed instructions with patient   What is the patient's perception of their health status since discharge? New symptoms unrelated to diagnosis   Is the patient/caregiver able to teach back signs and symptoms related to disease process for when to call PCP? Yes   Is the patient/caregiver able to teach back signs and symptoms related to disease process for when to call 911? Yes   Is the patient/caregiver able to teach back the hierarchy of who to call/visit for symptoms/problems? PCP, Specialist, Home health nurse, Urgent Care, ED, 911 Yes   If the patient is a current smoker, are they able to teach back resources for cessation? Not a smoker   Additional teach back comments States she is more confused in the last few day.  States this usually happens when she has a  UTI.  She has sent in a request to PCP office regarding getting a UA and has sample ready for home health to .  She also started her on an antibiotic.  She is concerned with her potassium levels after checking her labs.   TCM call completed? Yes   Wrap up additional comments Will message office with concerns.   Call end time 2625            Lissa Trejo LPN    2/5/2024, 11:37 EST

## 2024-02-05 NOTE — TELEPHONE ENCOUNTER
Caller: Nicolasa John    Relationship: Emergency Contact    Best call back number: [phone]    What orders are you requesting (i.e. lab or imaging): LABS UA    In what timeframe would the patient need to come in: TODAY  2/5/24    Where will you receive your lab/imaging services:     Additional notes: UTI, PLEASE SEE PATIENT MESSAGE

## 2024-02-06 ENCOUNTER — TELEPHONE (OUTPATIENT)
Dept: INTERNAL MEDICINE | Facility: CLINIC | Age: 79
End: 2024-02-06
Payer: MEDICARE

## 2024-02-06 NOTE — TELEPHONE ENCOUNTER
Needing lab orders, for BMP, magnesium, UA w/culture. Will take verbal orders, to number given [phone] Also, needing home health visits for 1 x per week for 3 weeks and then every other week until Apr. 4. Plus 2 PRN visits per Fallyn, can take verbal for these also, at [phone].

## 2024-02-13 ENCOUNTER — TELEPHONE (OUTPATIENT)
Dept: INTERNAL MEDICINE | Facility: CLINIC | Age: 79
End: 2024-02-13

## 2024-02-13 ENCOUNTER — OFFICE VISIT (OUTPATIENT)
Dept: INTERNAL MEDICINE | Facility: CLINIC | Age: 79
End: 2024-02-13
Payer: MEDICARE

## 2024-02-13 VITALS
TEMPERATURE: 98.4 F | OXYGEN SATURATION: 97 % | WEIGHT: 163 LBS | SYSTOLIC BLOOD PRESSURE: 132 MMHG | DIASTOLIC BLOOD PRESSURE: 74 MMHG | HEIGHT: 60 IN | HEART RATE: 76 BPM | BODY MASS INDEX: 32 KG/M2

## 2024-02-13 DIAGNOSIS — E11.8 TYPE 2 DIABETES MELLITUS WITH COMPLICATION, WITHOUT LONG-TERM CURRENT USE OF INSULIN: Primary | ICD-10-CM

## 2024-02-13 DIAGNOSIS — E11.8 TYPE 2 DIABETES MELLITUS WITH COMPLICATION, WITHOUT LONG-TERM CURRENT USE OF INSULIN: ICD-10-CM

## 2024-02-13 DIAGNOSIS — I25.10 CHRONIC CORONARY ARTERY DISEASE: ICD-10-CM

## 2024-02-13 DIAGNOSIS — I21.9 MYOCARDIAL INFARCTION IN RECOVERY PHASE: ICD-10-CM

## 2024-02-13 DIAGNOSIS — Z86.73 HISTORY OF CEREBROVASCULAR ACCIDENT (CVA) FROM RIGHT CAROTID ARTERY OCCLUSION INVOLVING RIGHT MIDDLE CEREBRAL ARTERY TERRITORY: ICD-10-CM

## 2024-02-13 PROCEDURE — 1159F MED LIST DOCD IN RCRD: CPT | Performed by: INTERNAL MEDICINE

## 2024-02-13 PROCEDURE — 1160F RVW MEDS BY RX/DR IN RCRD: CPT | Performed by: INTERNAL MEDICINE

## 2024-02-13 PROCEDURE — 99215 OFFICE O/P EST HI 40 MIN: CPT | Performed by: INTERNAL MEDICINE

## 2024-02-13 RX ORDER — INSULIN DEGLUDEC INJECTION 100 U/ML
35 INJECTION, SOLUTION SUBCUTANEOUS NIGHTLY
Qty: 35 ML | Refills: 3 | Status: SHIPPED | OUTPATIENT
Start: 2024-02-13

## 2024-02-13 RX ORDER — INSULIN ASPART 100 [IU]/ML
INJECTION, SOLUTION INTRAVENOUS; SUBCUTANEOUS
COMMUNITY
Start: 2024-02-03 | End: 2024-02-13 | Stop reason: SDUPTHER

## 2024-02-13 RX ORDER — ATORVASTATIN CALCIUM 80 MG/1
80 TABLET, FILM COATED ORAL DAILY
Qty: 90 TABLET | Refills: 3 | Status: SHIPPED | OUTPATIENT
Start: 2024-02-13

## 2024-02-13 RX ORDER — NITROFURANTOIN 25; 75 MG/1; MG/1
100 CAPSULE ORAL DAILY
COMMUNITY
Start: 2024-02-07

## 2024-02-13 RX ORDER — PEN NEEDLE, DIABETIC 33 GX5/32"
1 NEEDLE, DISPOSABLE MISCELLANEOUS 4 TIMES DAILY
Qty: 500 EACH | Refills: 3 | Status: SHIPPED | OUTPATIENT
Start: 2024-02-13

## 2024-02-13 RX ORDER — SPIRONOLACTONE 25 MG/1
12.5 TABLET ORAL EVERY OTHER DAY
Qty: 45 TABLET | Refills: 3 | Status: SHIPPED | OUTPATIENT
Start: 2024-02-13

## 2024-02-13 RX ORDER — TICAGRELOR 90 MG/1
90 TABLET ORAL 2 TIMES DAILY
COMMUNITY
End: 2024-02-13 | Stop reason: SDUPTHER

## 2024-02-13 RX ORDER — METOPROLOL SUCCINATE 25 MG/1
12.5 TABLET, EXTENDED RELEASE ORAL DAILY
COMMUNITY
Start: 2024-01-13 | End: 2024-02-13 | Stop reason: SDUPTHER

## 2024-02-13 RX ORDER — INSULIN DEGLUDEC INJECTION 100 U/ML
INJECTION, SOLUTION SUBCUTANEOUS
COMMUNITY
Start: 2024-02-03 | End: 2024-02-13 | Stop reason: SDUPTHER

## 2024-02-13 RX ORDER — MAGNESIUM OXIDE 400 MG/1
1 TABLET ORAL
COMMUNITY
Start: 2024-02-01

## 2024-02-13 RX ORDER — CEFDINIR 300 MG/1
CAPSULE ORAL
COMMUNITY
Start: 2024-02-03 | End: 2024-02-13

## 2024-02-13 RX ORDER — TICAGRELOR 90 MG/1
90 TABLET ORAL 2 TIMES DAILY
Qty: 180 TABLET | Refills: 3 | Status: SHIPPED | OUTPATIENT
Start: 2024-02-13

## 2024-02-13 RX ORDER — BUMETANIDE 1 MG/1
1 TABLET ORAL DAILY
Qty: 90 TABLET | Refills: 3 | Status: SHIPPED | OUTPATIENT
Start: 2024-02-13

## 2024-02-13 RX ORDER — BUMETANIDE 1 MG/1
1 TABLET ORAL
COMMUNITY
Start: 2024-01-13 | End: 2024-02-13 | Stop reason: SDUPTHER

## 2024-02-13 RX ORDER — DIGOXIN 125 MCG
125 TABLET ORAL DAILY
COMMUNITY
End: 2024-02-13 | Stop reason: SDUPTHER

## 2024-02-13 RX ORDER — ASPIRIN 81 MG/1
81 TABLET, COATED ORAL DAILY
COMMUNITY
End: 2024-02-13 | Stop reason: SDUPTHER

## 2024-02-13 RX ORDER — DIGOXIN 125 MCG
125 TABLET ORAL DAILY
Qty: 90 TABLET | Refills: 3 | Status: SHIPPED | OUTPATIENT
Start: 2024-02-13

## 2024-02-13 RX ORDER — SPIRONOLACTONE 25 MG/1
12.5 TABLET ORAL
COMMUNITY
Start: 2024-01-14 | End: 2024-02-13 | Stop reason: SDUPTHER

## 2024-02-13 RX ORDER — ATORVASTATIN CALCIUM 80 MG/1
80 TABLET, FILM COATED ORAL DAILY
COMMUNITY
Start: 2024-01-13 | End: 2024-02-13 | Stop reason: SDUPTHER

## 2024-02-13 RX ORDER — ASPIRIN 81 MG/1
81 TABLET, COATED ORAL DAILY
Qty: 90 TABLET | Refills: 3 | Status: SHIPPED | OUTPATIENT
Start: 2024-02-13

## 2024-02-13 RX ORDER — PANTOPRAZOLE SODIUM 40 MG/1
40 TABLET, DELAYED RELEASE ORAL DAILY
COMMUNITY
Start: 2024-02-01

## 2024-02-13 RX ORDER — METOPROLOL SUCCINATE 25 MG/1
12.5 TABLET, EXTENDED RELEASE ORAL DAILY
Qty: 45 TABLET | Refills: 3 | Status: SHIPPED | OUTPATIENT
Start: 2024-02-13

## 2024-02-13 RX ORDER — INSULIN ASPART 100 [IU]/ML
15 INJECTION, SOLUTION INTRAVENOUS; SUBCUTANEOUS 4 TIMES DAILY
Qty: 60 ML | Refills: 3 | Status: SHIPPED | OUTPATIENT
Start: 2024-02-13

## 2024-02-21 ENCOUNTER — OUTSIDE FACILITY SERVICE (OUTPATIENT)
Dept: INTERNAL MEDICINE | Facility: CLINIC | Age: 79
End: 2024-02-21
Payer: MEDICARE

## 2024-02-22 NOTE — TELEPHONE ENCOUNTER
[facility] [address] [place] [address] [facility] [phone] [facility] [phone] FX    Relationship: Pharmacy    What is the best time to reach you: ANYTIME     Who are you requesting to speak with (clinical staff, provider,  specific staff member): CLINICAL     What was the call regarding: PATIENTS PHARMACY WOULD LIKE TO KNOW IF THEY CAN CHANGE THE SIZE OF THE PATIENTS NEEDLES TO 32G X 6MM.     PATIENTS PHARMACY STATES THAT THE 33G X 6MM IS NOT AVAILABLE IN THEIR SYSTEM.

## 2024-02-26 NOTE — TELEPHONE ENCOUNTER
Rx Refill Note  Requested Prescriptions     Pending Prescriptions Disp Refills    Insulin Pen Needle 32G X 6 MM misc       Sig: Use.      Last office visit with prescribing clinician: 2/13/2024   Last telemedicine visit with prescribing clinician: Visit date not found   Next office visit with prescribing clinician: 5/13/2024                         Would you like a call back once the refill request has been completed: [] Yes [] No    If the office needs to give you a call back, can they leave a voicemail: [] Yes [] No    Genie Vance MA  02/26/24, 08:52 EST

## 2024-02-29 ENCOUNTER — READMISSION MANAGEMENT (OUTPATIENT)
Dept: CALL CENTER | Facility: HOSPITAL | Age: 79
End: 2024-02-29
Payer: MEDICARE

## 2024-03-01 ENCOUNTER — TRANSITIONAL CARE MANAGEMENT TELEPHONE ENCOUNTER (OUTPATIENT)
Dept: CALL CENTER | Facility: HOSPITAL | Age: 79
End: 2024-03-01
Payer: MEDICARE

## 2024-03-01 NOTE — OUTREACH NOTE
Call Center TCM Note      Flowsheet Row Responses   [facility] patient discharged from? Non-   Does the patient have one of the following disease processes/diagnoses(primary or secondary)? Other   TCM attempt successful? Yes   Call start time 1046   Call end time 1054   Is patient permission given to speak with other caregiver? Yes   Person spoke with today (if not patient) and relationship Daughter-Nicolasa.   Meds reviewed with patient/caregiver? Yes   Is the patient having any side effects they believe may be caused by any medication additions or changes? No   Does the patient have all medications ordered at discharge? Yes   Is the patient taking all medications as directed (includes completed medication regime)? Yes   Medication comments Daughter states is needing insulin pen needles, and has not received Freestyle Angela in the mail. Will route message to PCP office for review.   Comments Daughter states will make hospital f/u appt with PCP if PCP feels is needed. Will route to PCP office for review.   Does the patient have an appointment with their PCP within 7-14 days of discharge? No   Nursing Interventions Patient declined scheduling/rescheduling appointment at this time, Routed TCM call to PCP office   Has home health visited the patient within 72 hours of discharge? Call prior to 72 hours   Home health comments Daughter states HH nurse will visit today.   DME comments Patient has drain in place.   Psychosocial issues? No   Psychosocial comments Daughter assisting patient.   Did the patient receive a copy of their discharge instructions? Yes   Nursing interventions Reviewed instructions with patient   What is the patient's perception of their health status since discharge? Same   Is the patient/caregiver able to teach back signs and symptoms related to disease process for when to call PCP? Yes   Is the patient/caregiver able to teach back signs and symptoms related to disease process for when to  call 911? Yes   Is the patient/caregiver able to teach back the hierarchy of who to call/visit for symptoms/problems? PCP, Specialist, Home health nurse, Urgent Care, ED, 911 Yes   If the patient is a current smoker, are they able to teach back resources for cessation? Not a smoker   TCM call completed? Yes   Wrap up additional comments Daughter states patient is about the same. States vincent drain in place, and will have HH nurse evaluate today. States patient had some nausea last night which resolved. Denies any fever or abdominal pain. Denies any concerns. Will route message to PCP office regarding need for insulin pen needles, and f/u on Freestyle Angela. TCM complete.   Call end time 1054   Would this patient benefit from a Referral to [facility] Social Work? No   Is the patient interested in additional calls from an ambulatory ? No            Sharon Rivas RN    3/1/2024, 10:55 EST

## 2024-03-01 NOTE — OUTREACH NOTE
Prep Survey      Flowsheet Row Responses   [facility] facility patient discharged from? Non-BH   Is LACE score < 7 ? Non-BH Discharge   Eligibility [facility]   Date of Admission 02/24/24   Date of Discharge 02/29/24   Discharge Disposition [facility]Health Care [facility]   Discharge diagnosis Cholecystitis/ NSTEMI   Does the patient have one of the following disease processes/diagnoses(primary or secondary)? Acute MI (STEMI,NSTEMI)   Does the patient have Home health ordered? Yes   Is there a DME ordered? No   Prep survey completed? Yes            YUN GROVE - Registered Nurse

## 2024-03-06 ENCOUNTER — TELEPHONE (OUTPATIENT)
Dept: INTERNAL MEDICINE | Facility: CLINIC | Age: 79
End: 2024-03-06
Payer: MEDICARE

## 2024-03-06 NOTE — TELEPHONE ENCOUNTER
Nam is calling and needs lab orders for a CMP, patient is a little slower to respond today, daughter is a doctor and is requesting a check of this, will take a verbal order, asap

## 2024-03-06 NOTE — TELEPHONE ENCOUNTER
Francheska quintana/Nam needs orders for PT on this patient for 2 z per week for 4 weeks, will take a verbal at [phone], if possible by end of week please

## 2024-03-11 ENCOUNTER — OUTSIDE FACILITY SERVICE (OUTPATIENT)
Dept: INTERNAL MEDICINE | Facility: CLINIC | Age: 79
End: 2024-03-11
Payer: MEDICARE

## 2024-03-14 DIAGNOSIS — E55.9 VITAMIN D DEFICIENCY: ICD-10-CM

## 2024-03-15 RX ORDER — ERGOCALCIFEROL 1.25 MG/1
50000 CAPSULE ORAL WEEKLY
Qty: 13 CAPSULE | Refills: 0 | Status: SHIPPED | OUTPATIENT
Start: 2024-03-15

## 2024-03-18 RX ORDER — MAGNESIUM OXIDE 400 MG/1
1 TABLET ORAL 2 TIMES DAILY
Qty: 180 TABLET | Refills: 1 | Status: SHIPPED | OUTPATIENT
Start: 2024-03-18

## 2024-03-18 NOTE — TELEPHONE ENCOUNTER
Rx Refill Note  Requested Prescriptions     Pending Prescriptions Disp Refills    magnesium oxide (MAG-OX) 400 MG tablet 180 tablet 1     Sig: Take 1 tablet by mouth 2 (Two) Times a Day.    phosphorus (K PHOS NEUTRAL) [phone] MG tablet 90 tablet 1     Sig: Take 1 tablet by mouth Daily.      Last office visit with prescribing clinician: 2/13/2024   Last telemedicine visit with prescribing clinician: Visit date not found   Next office visit with prescribing clinician: 5/13/2024                         Would you like a call back once the refill request has been completed: [] Yes [] No    If the office needs to give you a call back, can they leave a voicemail: [] Yes [] No    Mireya Moe MA  03/18/24, 15:00 EDT

## 2024-03-22 ENCOUNTER — PATIENT MESSAGE (OUTPATIENT)
Dept: INTERNAL MEDICINE | Facility: CLINIC | Age: 79
End: 2024-03-22
Payer: MEDICARE

## 2024-03-22 RX ORDER — FLASH GLUCOSE SENSOR
KIT MISCELLANEOUS
Qty: 9 EACH | Refills: 3 | Status: SHIPPED | OUTPATIENT
Start: 2024-03-22

## 2024-03-22 NOTE — TELEPHONE ENCOUNTER
From: Lydia John  To: Selena Davis  Sent: 3/22/2024 6:08 AM EDT  Subject: Freestyle Angela sensors    Mireya,  I know I’m a broken record, but we still don’t have those sensors. Can you call again? Thanks,  Agnes John

## 2024-03-26 ENCOUNTER — TELEPHONE (OUTPATIENT)
Dept: INTERNAL MEDICINE | Facility: CLINIC | Age: 79
End: 2024-03-26
Payer: MEDICARE

## 2024-03-26 NOTE — TELEPHONE ENCOUNTER
----- Message from Genie Vance MA sent at 3/22/2024  2:45 PM EDT -----  Regarding: FW: Freestyle Angela sensors  Contact: [phone]  I didn't see those on her med list. They probably will need a PA.  ----- Message -----  From: Lydia John  Sent: 3/22/2024   6:08 AM EDT  To: Don Ramos[address] [place] [address]  Subject: Freestyle Angela sensors                          Mireya,  I know I’m a broken record, but we still don’t have those sensors. Can you call again? Thanks,  Agnes John

## 2024-04-04 ENCOUNTER — READMISSION MANAGEMENT (OUTPATIENT)
Dept: CALL CENTER | Facility: HOSPITAL | Age: 79
End: 2024-04-04
Payer: MEDICARE

## 2024-04-04 NOTE — OUTREACH NOTE
Prep Survey      Flowsheet Row Responses   [facility] patient discharged from? Non-BH   Is LACE score < 7 ? Non-BH Discharge   Eligibility Not Eligible   What are the reasons patient is not eligible? Other  [no recent external discharge]   Does the patient have one of the following disease processes/diagnoses(primary or secondary)? Other   Prep survey completed? Yes            Margy Santana Registered Nurse

## 2024-04-15 ENCOUNTER — TELEPHONE (OUTPATIENT)
Dept: INTERNAL MEDICINE | Facility: CLINIC | Age: 79
End: 2024-04-15
Payer: MEDICARE

## 2024-04-15 NOTE — TELEPHONE ENCOUNTER
I can put in orders for pt to have b12 levels checked.  I can't get rx covered without low levels.  She can also try otc daily sublingual vit b12.  Just let me know what she prefers

## 2024-04-15 NOTE — TELEPHONE ENCOUNTER
Calling, patient is sluggish, low energy, want to see if can get a B12 pres. And or shot, she uses [facility] in [place], call and let Lance know, so she can let the patient know what's going on

## 2024-04-16 NOTE — TELEPHONE ENCOUNTER
Bladimirm for Lance to call back    Relay:   I can put in orders for pt to have b12 levels checked.  I can't get rx covered without low levels.  She can also try otc daily sublingual vit b12.  Just let me know what she prefers

## 2024-04-30 ENCOUNTER — OUTSIDE FACILITY SERVICE (OUTPATIENT)
Dept: INTERNAL MEDICINE | Facility: CLINIC | Age: 79
End: 2024-04-30
Payer: MEDICARE

## 2024-05-21 ENCOUNTER — OFFICE VISIT (OUTPATIENT)
Dept: INTERNAL MEDICINE | Facility: CLINIC | Age: 79
End: 2024-05-21
Payer: MEDICARE

## 2024-05-21 VITALS
OXYGEN SATURATION: 98 % | BODY MASS INDEX: 32.35 KG/M2 | HEIGHT: 60 IN | SYSTOLIC BLOOD PRESSURE: 118 MMHG | WEIGHT: 164.8 LBS | DIASTOLIC BLOOD PRESSURE: 70 MMHG | TEMPERATURE: 98.2 F | HEART RATE: 64 BPM

## 2024-05-21 DIAGNOSIS — E11.40 TYPE 2 DIABETES MELLITUS WITH DIABETIC NEUROPATHY, WITHOUT LONG-TERM CURRENT USE OF INSULIN: Primary | ICD-10-CM

## 2024-05-21 DIAGNOSIS — I10 ESSENTIAL HYPERTENSION: ICD-10-CM

## 2024-05-21 DIAGNOSIS — F43.21 SITUATIONAL DEPRESSION: ICD-10-CM

## 2024-05-21 DIAGNOSIS — I25.10 CHRONIC CORONARY ARTERY DISEASE: ICD-10-CM

## 2024-05-21 DIAGNOSIS — K21.9 GASTROESOPHAGEAL REFLUX DISEASE, UNSPECIFIED WHETHER ESOPHAGITIS PRESENT: ICD-10-CM

## 2024-05-21 PROCEDURE — 1160F RVW MEDS BY RX/DR IN RCRD: CPT | Performed by: INTERNAL MEDICINE

## 2024-05-21 PROCEDURE — 99215 OFFICE O/P EST HI 40 MIN: CPT | Performed by: INTERNAL MEDICINE

## 2024-05-21 PROCEDURE — 1126F AMNT PAIN NOTED NONE PRSNT: CPT | Performed by: INTERNAL MEDICINE

## 2024-05-21 PROCEDURE — 1159F MED LIST DOCD IN RCRD: CPT | Performed by: INTERNAL MEDICINE

## 2024-05-21 RX ORDER — ACETAMINOPHEN 500 MG
500 TABLET ORAL EVERY 6 HOURS PRN
COMMUNITY

## 2024-05-21 RX ORDER — PANTOPRAZOLE SODIUM 40 MG/1
40 TABLET, DELAYED RELEASE ORAL 2 TIMES DAILY
Qty: 180 TABLET | Refills: 1 | Status: SHIPPED | OUTPATIENT
Start: 2024-05-21

## 2024-05-21 NOTE — PROGRESS NOTES
Lydia John is a 78 y.o. female, who presents with a chief complaint of   Chief Complaint   Patient presents with    Diabetes     3 month follow up           Diabetes       Pt here for follow up.   Her  daughter helps provide history.      Sinice her her last visit she has had cholecystitis.  She had to have a a drain in place and was recently able to have her gallbladder removed (5/8).  She is feeling much better now.  She had lots of upset stomach and reflux prior to getting her gallbladder out. She is starting to get her appetite back. She is on pantoprazole 40mg daily    DM - pt has been using her freestyle marivel.  A1c per the cgm ins around 7.4.  she is not needing her SSI   during they day.  She uses ssi 10 of novolog insulin in the evening if her glucose is >200 then 18 units of tresiba nightly.    Recent stemi with secondary cva - pt on asa, atorvastatin, brilinta, bumex, spironolactone.  She is no longer on digoxin      The following portions of the patient's history were reviewed and updated as appropriate: allergies, current medications, past family history, past medical history, past social history, past surgical history and problem list.    Allergies: Hydrocodone-acetaminophen    Review of Systems   Constitutional: Negative.    HENT: Negative.     Eyes: Negative.    Respiratory: Negative.     Cardiovascular: Negative.    Gastrointestinal: Negative.    Endocrine: Negative.    Genitourinary: Negative.    Musculoskeletal: Negative.    Skin: Negative.    Allergic/Immunologic: Negative.    Neurological: Negative.    Hematological: Negative.    Psychiatric/Behavioral: Negative.     All other systems reviewed and are negative.            Wt Readings from Last 3 Encounters:   05/21/24 74.8 kg (164 lb 12.8 oz)   02/13/24 73.9 kg (163 lb)   11/06/23 77.3 kg (170 lb 8 oz)     Temp Readings from Last 3 Encounters:   05/21/24 98.2 °F (36.8 °C) (Infrared)   02/13/24 98.4 °F (36.9 °C) (Infrared)   11/28/23  98.2 °F (36.8 °C) (Infrared)     BP Readings from Last 3 Encounters:   05/21/24 118/70   02/13/24 132/74   11/06/23 160/98     Pulse Readings from Last 3 Encounters:   05/21/24 64   02/13/24 76   11/06/23 102     Body mass index is 32.19 kg/m².  SpO2 Readings from Last 3 Encounters:   05/21/24 98%   02/13/24 97%   11/06/23 97%          Physical Exam  Vitals and nursing note reviewed.   Constitutional:       General: She is not in acute distress.     Appearance: She is well-developed.      Comments: In wheelchair   HENT:      Head: Normocephalic and atraumatic.      Right Ear: External ear normal.      Left Ear: External ear normal.      Nose: Nose normal.   Eyes:      Conjunctiva/sclera: Conjunctivae normal.      Pupils: Pupils are equal, round, and reactive to light.   Cardiovascular:      Rate and Rhythm: Normal rate and regular rhythm.      Heart sounds: Normal heart sounds.   Pulmonary:      Effort: Pulmonary effort is normal. No respiratory distress.      Breath sounds: Normal breath sounds. No wheezing.   Musculoskeletal:      Cervical back: Normal range of motion and neck supple.      Right lower leg: No edema.      Left lower leg: No edema.   Skin:     General: Skin is warm and dry.   Neurological:      General: No focal deficit present.      Mental Status: She is alert and oriented to person, place, and time.   Psychiatric:         Mood and Affect: Mood normal.         Behavior: Behavior normal.         Thought Content: Thought content normal.         Judgment: Judgment normal.         Results for orders placed or performed in visit on 11/06/23   Comprehensive Metabolic Panel    Specimen: Blood   Result Value Ref Range    Glucose 250 (H) 65 - 99 mg/dL    BUN 23 8 - 23 mg/dL    Creatinine 0.71 0.57 - 1.00 mg/dL    EGFR Result 87.7 >60.0 mL/min/1.73    BUN/Creatinine Ratio 32.4 (H) 7.0 - 25.0    Sodium 137 136 - 145 mmol/L    Potassium 4.6 3.5 - 5.2 mmol/L    Chloride 96 (L) 98 - 107 mmol/L    Total CO2 27.5  22.0 - 29.0 mmol/L    Calcium 10.6 (H) 8.6 - 10.5 mg/dL    Total Protein 6.7 6.0 - 8.5 g/dL    Albumin 4.5 3.5 - 5.2 g/dL    Globulin 2.2 gm/dL    A/G Ratio 2.0 g/dL    Total Bilirubin 0.2 0.0 - 1.2 mg/dL    Alkaline Phosphatase 115 39 - 117 U/L    AST (SGOT) 18 1 - 32 U/L    ALT (SGPT) 20 1 - 33 U/L   Hemoglobin A1c    Specimen: Blood   Result Value Ref Range    Hemoglobin A1C 8.70 (H) 4.80 - 5.60 %   Lipid Panel With LDL / HDL Ratio    Specimen: Blood   Result Value Ref Range    Total Cholesterol 165 0 - 200 mg/dL    Triglycerides 169 (H) 0 - 150 mg/dL    HDL Cholesterol 57 40 - 60 mg/dL    VLDL Cholesterol Tyrell 29 5 - 40 mg/dL    LDL Chol Calc (NIH) 79 0 - 100 mg/dL    LDL/HDL RATIO 1.30    POC Microalbumin    Specimen: Urine   Result Value Ref Range    Microalbumin, Urine 30     Creatinine, Urine 200     Lot Number [id number]     Expiration Date 9-30-24    CBC & Differential    Specimen: Blood   Result Value Ref Range    WBC 10.16 3.40 - 10.80 10*3/mm3    RBC 4.76 3.77 - 5.28 10*6/mm3    Hemoglobin 13.3 12.0 - 15.9 g/dL    Hematocrit 40.0 34.0 - 46.6 %    MCV 84.0 79.0 - 97.0 fL    MCH 27.9 26.6 - 33.0 pg    MCHC 33.3 31.5 - 35.7 g/dL    RDW 14.5 12.3 - 15.4 %    Platelets 285 140 - 450 10*3/mm3    Neutrophil Rel % 68.9 42.7 - 76.0 %    Lymphocyte Rel % 21.5 19.6 - 45.3 %    Monocyte Rel % 5.8 5.0 - 12.0 %    Eosinophil Rel % 3.1 0.3 - 6.2 %    Basophil Rel % 0.2 0.0 - 1.5 %    Neutrophils Absolute 7.01 (H) 1.70 - 7.00 10*3/mm3    Lymphocytes Absolute 2.18 0.70 - 3.10 10*3/mm3    Monocytes Absolute 0.59 0.10 - 0.90 10*3/mm3    Eosinophils Absolute 0.31 0.00 - 0.40 10*3/mm3    Basophils Absolute 0.02 0.00 - 0.20 10*3/mm3    Immature Granulocyte Rel % 0.5 0.0 - 0.5 %    Immature Grans Absolute 0.05 0.00 - 0.05 10*3/mm3    nRBC 0.0 0.0 - 0.2 /100 WBC     Result Review :   The following data was reviewed by: Selena Davis MD on 05/21/2024:  Common labs          2/27/2024    05:03 2/28/2024    05:22 2/28/2024     16:21 2/29/2024    04:00   Common Labs   Potassium   4.4        WBC 14.93     9.93      8.07       Hemoglobin 10.7     10.2      10.6       Hematocrit 32.8     31.1      32.2       Platelets 236     221      218          Details          This result is from an external source.             Data reviewed : Cardiology studies echo, Consultant notes cardiology, surgery, and Recent hospitalization notes 5/8 cholecystectomy and prior cholecystitis hospitalization       2/24/24 Echo   Summary:                 The ejection fraction biplane was calculated at 30%.                            Borderline left ventricular hypertrophy.                            The estimated ejection fraction is 30-35%.                            Mitral valve tissue Doppler E/E'' ratio consistent with elevated left atrial pressures.                            Intravenous contrast was used to enhance endocardial border definition.                            TDS                            Mildly dilated left atrium.                            The right ventricular chamber size and systolic function are within normal limits.                            The right atrial chamber size appears normal.                            The aortic valve appears grossly normal in structure . There is no evidence of aortic regurgitation.                            The mitral valve appears mildly thickened. There is no evidence of mitral regurgitation.                            The tricuspid valve not well seen. There is trace tricuspid regurgitation.                            The aortic root appears normal.                            There is no dilatation of the ascending aorta.                            No evidence of pericardial effusion.       Assessment and Plan    Diagnoses and all orders for this visit:    1. Type 2 diabetes mellitus with diabetic neuropathy, without long-term current use of insulin (Primary) - missed endo appt bc pt in the hospital.  She  has actually done much better since we added the CGM.  A1c estimate based on cgm should be around 7.4.    -     Comprehensive Metabolic Panel  -     Lipid Panel With LDL / HDL Ratio  -     Hemoglobin A1c  -     CBC & Differential  -     T4, Free  -     TSH  -     Microalbumin / Creatinine Urine Ratio - Urine, Clean Catch    2. Essential hypertension - well controlled.   -     Comprehensive Metabolic Panel  -     Lipid Panel With LDL / HDL Ratio  -     Hemoglobin A1c  -     CBC & Differential  -     T4, Free  -     TSH  -     Microalbumin / Creatinine Urine Ratio - Urine, Clean Catch    3. Chronic coronary artery disease - on brilinta, lipitor, bumex, metoprolol, spironolactone  -     Comprehensive Metabolic Panel  -     Lipid Panel With LDL / HDL Ratio  -     Hemoglobin A1c  -     CBC & Differential  -     T4, Free  -     TSH  -     Microalbumin / Creatinine Urine Ratio - Urine, Clean Catch     4. Situational depression - pt already on cymbalta.  She has had multiple hospitalizations in the past few months and is slowly improving.  Cont working with therapy team.  She hopes to be back at her home very soon.  She declines counseling at this time.     5. GERD - increase pantoprazole to bid.  Worsening gerd s/p cholecystectomy could be contributing to her sensation of soa and trouble breathing.        I spent 65 minutes caring for Lydia on this date of service. This time includes time spent by me in the following activities:preparing for the visit, reviewing tests, obtaining and/or reviewing a separately obtained history, performing a medically appropriate examination and/or evaluation , counseling and educating the patient/family/caregiver, ordering medications, tests, or procedures, documenting information in the medical record, independently interpreting results and communicating that information with the patient/family/caregiver, and care coordination      Outpatient Medications Prior to Visit   Medication Sig  Dispense Refill    acetaminophen (TYLENOL) 500 MG tablet Take 1 tablet by mouth Every 6 (Six) Hours As Needed for Mild Pain. PRN      Aspirin Low Dose 81 MG EC tablet Take 1 tablet by mouth Daily. 90 tablet 3    atorvastatin (LIPITOR) 80 MG tablet Take 1 tablet by mouth Daily. 90 tablet 3    Brilinta 90 MG tablet tablet Take 1 tablet by mouth 2 (Two) Times a Day. 180 tablet 3    bumetanide (BUMEX) 1 MG tablet Take 1 tablet by mouth Daily. 90 tablet 3    calcium citrate-vitamin d (CITRACAL) 200-250 MG-UNIT tablet tablet Take 1 tablet by mouth Daily.      Continuous Blood Gluc Sensor ([employer] Angela Sensor System) Use Every 10 (Ten) Days. 9 each 3    DULoxetine (CYMBALTA) 60 MG capsule Take 1 capsule by mouth once daily 90 capsule 3    glucose blood (Accu-Chek Guide) test strip 1 each by Other route 2 (Two) Times a Day. Use as instructed 200 each 4    Insulin Pen Needle 32G X 6 MM misc Use 1 each Take As Directed. 100 each 3    metoprolol succinate XL (TOPROL-XL) 25 MG 24 hr tablet Take 0.5 tablets by mouth Daily. 45 tablet 3    NovoLOG FlexPen 100 UNIT/ML solution pen-injector sc pen Inject 15 Units under the skin into the appropriate area as directed 4 (Four) Times a Day. 60 mL 3    spironolactone (ALDACTONE) 25 MG tablet Take 0.5 tablets by mouth Every Other Day. 45 tablet 3    Tresiba FlexTouch 100 UNIT/ML solution pen-injector injection Inject 35 Units under the skin into the appropriate area as directed Every Night. (Patient taking differently: Inject 18 Units under the skin into the appropriate area as directed Every Night.) 35 mL 3    vitamin D (ERGOCALCIFEROL) 1.25 MG (81368 UT) capsule capsule Take 1 capsule by mouth once a week 13 capsule 0    pantoprazole (PROTONIX) 40 MG EC tablet Take 1 tablet by mouth 2 (Two) Times a Day.      digoxin (LANOXIN) 125 MCG tablet Take 1 tablet by mouth Daily. 90 tablet 3    magnesium oxide (MAG-OX) 400 MG tablet Take 1 tablet by mouth 2 (Two) Times a Day. 180 tablet 1     nitrofurantoin, macrocrystal-monohydrate, (MACROBID) 100 MG capsule Take 1 capsule by mouth Daily.      phosphorus (K PHOS NEUTRAL) [phone] MG tablet Take 1 tablet by mouth Daily. 90 tablet 1     No facility-administered medications prior to visit.     New Medications Ordered This Visit   Medications    pantoprazole (PROTONIX) 40 MG EC tablet     Sig: Take 1 tablet by mouth 2 (Two) Times a Day.     Dispense:  180 tablet     Refill:  1     [unfilled]  Medications Discontinued During This Encounter   Medication Reason    magnesium oxide (MAG-OX) 400 MG tablet *Therapy completed    nitrofurantoin, macrocrystal-monohydrate, (MACROBID) 100 MG capsule *Therapy completed    phosphorus (K PHOS NEUTRAL) [phone] MG tablet *Therapy completed    digoxin (LANOXIN) 125 MCG tablet *Therapy completed    pantoprazole (PROTONIX) 40 MG EC tablet Reorder         Return in about 3 months (around 8/21/2024) for Recheck, labs.    Patient was given instructions and counseling regarding her condition or for health maintenance advice. Please see specific information pulled into the AVS if appropriate.

## 2024-05-22 LAB
ALBUMIN SERPL-MCNC: 3.8 G/DL (ref 3.8–4.8)
ALBUMIN/GLOB SERPL: 1.5 {RATIO} (ref 1.2–2.2)
ALP SERPL-CCNC: 170 IU/L (ref 44–121)
ALT SERPL-CCNC: 59 IU/L (ref 0–32)
AST SERPL-CCNC: 52 IU/L (ref 0–40)
BASOPHILS # BLD AUTO: 0 X10E3/UL (ref 0–0.2)
BASOPHILS NFR BLD AUTO: 0 %
BILIRUB SERPL-MCNC: 0.3 MG/DL (ref 0–1.2)
BUN SERPL-MCNC: 26 MG/DL (ref 8–27)
BUN/CREAT SERPL: 30 (ref 12–28)
CALCIUM SERPL-MCNC: 9.8 MG/DL (ref 8.7–10.3)
CHLORIDE SERPL-SCNC: 102 MMOL/L (ref 96–106)
CHOLEST SERPL-MCNC: 117 MG/DL (ref 100–199)
CO2 SERPL-SCNC: 17 MMOL/L (ref 20–29)
CREAT SERPL-MCNC: 0.87 MG/DL (ref 0.57–1)
CREAT UR-MCNC: NORMAL MG/DL
EGFRCR SERPLBLD CKD-EPI 2021: 69 ML/MIN/1.73
EOSINOPHIL # BLD AUTO: 0.6 X10E3/UL (ref 0–0.4)
EOSINOPHIL NFR BLD AUTO: 5 %
ERYTHROCYTE [DISTWIDTH] IN BLOOD BY AUTOMATED COUNT: 14.9 % (ref 11.7–15.4)
GLOBULIN SER CALC-MCNC: 2.5 G/DL (ref 1.5–4.5)
GLUCOSE SERPL-MCNC: 139 MG/DL (ref 70–99)
HBA1C MFR BLD: 8.1 % (ref 4.8–5.6)
HCT VFR BLD AUTO: 35.6 % (ref 34–46.6)
HDLC SERPL-MCNC: 39 MG/DL
HGB BLD-MCNC: 11.4 G/DL (ref 11.1–15.9)
IMM GRANULOCYTES # BLD AUTO: 0.1 X10E3/UL (ref 0–0.1)
IMM GRANULOCYTES NFR BLD AUTO: 1 %
LDLC SERPL CALC-MCNC: 56 MG/DL (ref 0–99)
LDLC/HDLC SERPL: 1.4 RATIO (ref 0–3.2)
LYMPHOCYTES # BLD AUTO: 2.5 X10E3/UL (ref 0.7–3.1)
LYMPHOCYTES NFR BLD AUTO: 21 %
MCH RBC QN AUTO: 27.3 PG (ref 26.6–33)
MCHC RBC AUTO-ENTMCNC: 32 G/DL (ref 31.5–35.7)
MCV RBC AUTO: 85 FL (ref 79–97)
MICROALBUMIN UR-MCNC: NORMAL
MONOCYTES # BLD AUTO: 0.6 X10E3/UL (ref 0.1–0.9)
MONOCYTES NFR BLD AUTO: 5 %
NEUTROPHILS # BLD AUTO: 8 X10E3/UL (ref 1.4–7)
NEUTROPHILS NFR BLD AUTO: 68 %
PLATELET # BLD AUTO: 413 X10E3/UL (ref 150–450)
POTASSIUM SERPL-SCNC: 4.6 MMOL/L (ref 3.5–5.2)
PROT SERPL-MCNC: 6.3 G/DL (ref 6–8.5)
RBC # BLD AUTO: 4.18 X10E6/UL (ref 3.77–5.28)
SODIUM SERPL-SCNC: 134 MMOL/L (ref 134–144)
T4 FREE SERPL-MCNC: 1.4 NG/DL (ref 0.82–1.77)
TRIGL SERPL-MCNC: 125 MG/DL (ref 0–149)
TSH SERPL DL<=0.005 MIU/L-ACNC: 3.33 UIU/ML (ref 0.45–4.5)
VLDLC SERPL CALC-MCNC: 22 MG/DL (ref 5–40)
WBC # BLD AUTO: 11.8 X10E3/UL (ref 3.4–10.8)

## 2024-05-24 ENCOUNTER — DOCUMENTATION (OUTPATIENT)
Dept: ONCOLOGY | Facility: HOSPITAL | Age: 79
End: 2024-05-24
Payer: MEDICARE

## 2024-06-03 ENCOUNTER — READMISSION MANAGEMENT (OUTPATIENT)
Dept: CALL CENTER | Facility: HOSPITAL | Age: 79
End: 2024-06-03
Payer: MEDICARE

## 2024-06-03 NOTE — OUTREACH NOTE
Prep Survey      Flowsheet Row Responses   [facility] facility patient discharged from? Non-BH   Is LACE score < 7 ? Non-BH Discharge   Eligibility Not Eligible   What are the reasons patient is not eligible? Other  [No recent admit]   Does the patient have one of the following disease processes/diagnoses(primary or secondary)? Other   Prep survey completed? Yes            Sophia FLAHERTY - Registered Nurse

## 2024-06-04 ENCOUNTER — TELEPHONE (OUTPATIENT)
Dept: INTERNAL MEDICINE | Facility: CLINIC | Age: 79
End: 2024-06-04
Payer: MEDICARE

## 2024-06-04 NOTE — TELEPHONE ENCOUNTER
She is needing verbal orders 2 times a week for 4 weeks.  Can we call Francheska at [facility] for the orders.

## 2024-06-14 ENCOUNTER — OUTSIDE FACILITY SERVICE (OUTPATIENT)
Dept: INTERNAL MEDICINE | Facility: CLINIC | Age: 79
End: 2024-06-14
Payer: MEDICARE

## 2024-06-14 PROCEDURE — OUTSIDEPOS PR OUTSIDE POS PLACEHOLDER: Performed by: INTERNAL MEDICINE

## 2024-06-27 ENCOUNTER — TELEPHONE (OUTPATIENT)
Dept: INTERNAL MEDICINE | Facility: CLINIC | Age: 79
End: 2024-06-27

## 2024-06-27 NOTE — TELEPHONE ENCOUNTER
Caller: JENNIFER Boogie call back number:[phone]    What orders are you requesting (i.e. lab or imaging): PLEASE EXTEND PHYSICAL THERAPY FOR TWICE A WEEK FOR 4 MORE WEEKS      Where will you receive your lab/imaging services: [facility]

## 2024-07-09 DIAGNOSIS — E55.9 VITAMIN D DEFICIENCY: ICD-10-CM

## 2024-07-09 DIAGNOSIS — E11.44 DIABETIC AMYOTROPHY ASSOCIATED WITH TYPE 2 DIABETES MELLITUS: ICD-10-CM

## 2024-07-09 DIAGNOSIS — F43.21 SITUATIONAL DEPRESSION: ICD-10-CM

## 2024-07-10 RX ORDER — DULOXETIN HYDROCHLORIDE 60 MG/1
60 CAPSULE, DELAYED RELEASE ORAL DAILY
Qty: 90 CAPSULE | Refills: 0 | Status: SHIPPED | OUTPATIENT
Start: 2024-07-10

## 2024-07-10 RX ORDER — ERGOCALCIFEROL 1.25 MG/1
50000 CAPSULE ORAL WEEKLY
Qty: 13 CAPSULE | Refills: 0 | Status: SHIPPED | OUTPATIENT
Start: 2024-07-10

## 2024-07-30 ENCOUNTER — PATIENT OUTREACH (OUTPATIENT)
Dept: CASE MANAGEMENT | Facility: OTHER | Age: 79
End: 2024-07-30
Payer: MEDICARE

## 2024-07-30 NOTE — OUTREACH NOTE
AMBULATORY CASE MANAGEMENT NOTE    Names and Relationships of Patient/Support Persons: Contact: Nicolasa John; Relationship: Emergency Contact -     Patient Outreach  RN-ACM outreach with patient's daughterNicolasa ( a physician) regarding identified proactive HRCM review. Daughter states patient is receiving [facility] services of PT, OT< OT, ST. Daughter states patient is compliant with medications; medical appointments and monitoring of blood sugars with values WNL's. Daughter states patient has no difficulty with fever; chest pain; SOB; appetite or sleeping.  Daughter discusses Prolia injection and scheduling for patient  and need for follow up.Reviewed with daughter role of RN-ACM and HRCM case management services.Daughter verbalized understanding; states to be physician and states to appreciate outreach and declines needs for further outreach at this time.   Note routed to PCP.   Adult Patient Profile  Questions/Answers      Flowsheet Row Most Recent Value   Symptoms/Conditions Managed at Home diabetes, type 2   Diabetes Management Strategies medication therapy, other (see comments), blood glucose testing  [Physician follow up]   Barriers to Taking Medication as Prescribed none   Equipment Currently Used at Home other (see comments)  [continuous glucose monitor]   Primary Source of Support/Comfort child(donovan), spouse   Name of Support/Comfort Primary Source Daugther states patient has assistance as needed.   People in Home spouse        Social Work Assessment  Questions/Answers      Flowsheet Row Most Recent Value   People in Home spouse   Functional Status Comments Daughter states patient receiving assistance from family as needed.   Equipment Currently Used at Home other (see comments)  [continuous glucose monitor]        Send Education  Questions/Answers      Flowsheet Row Most Recent Value   Annual Wellness Visit:  Patient Has Completed   Other Patient Education/Resources  24/7 [facility]  Healthcare Nurse Call LineAlyssia        [facility] updated and reviewed with the patient during this program:  --     Food Insecurity: No Food Insecurity (7/30/2024)    Hunger Vital Sign     Worried About Running Out of Food in the Last Year: Never true     Ran Out of Food in the Last Year: Never true      --     Transportation Needs: No Transportation Needs (7/30/2024)    PRAPARE - Transportation     Lack of Transportation (Medical): No     Lack of Transportation (Non-Medical): No       Zo T  Ambulatory Case Management    7/30/2024, 17:09 EDT

## 2024-08-02 ENCOUNTER — READMISSION MANAGEMENT (OUTPATIENT)
Dept: CALL CENTER | Facility: HOSPITAL | Age: 79
End: 2024-08-02
Payer: MEDICARE

## 2024-08-02 NOTE — OUTREACH NOTE
Prep Survey      Flowsheet Row Responses   [facility] facility patient discharged from? Non-BH   Is LACE score < 7 ? Non-BH Discharge   Eligibility Not Eligible   What are the reasons patient is not eligible? Other  [no recent external dicharge noted]   Does the patient have one of the following disease processes/diagnoses(primary or secondary)? Other   Prep survey completed? Yes            Margy Santana Registered Nurse

## 2024-08-05 RX ORDER — PEN NEEDLE, DIABETIC 32 GX 1/4"
NEEDLE, DISPOSABLE MISCELLANEOUS
Qty: 100 EACH | Refills: 0 | Status: SHIPPED | OUTPATIENT
Start: 2024-08-05

## 2024-08-21 ENCOUNTER — OFFICE VISIT (OUTPATIENT)
Dept: INTERNAL MEDICINE | Facility: CLINIC | Age: 79
End: 2024-08-21
Payer: MEDICARE

## 2024-08-21 VITALS
TEMPERATURE: 97.3 F | SYSTOLIC BLOOD PRESSURE: 104 MMHG | OXYGEN SATURATION: 98 % | WEIGHT: 165.8 LBS | HEART RATE: 51 BPM | BODY MASS INDEX: 32.55 KG/M2 | DIASTOLIC BLOOD PRESSURE: 68 MMHG | HEIGHT: 60 IN

## 2024-08-21 DIAGNOSIS — Z82.0 FAMILY HISTORY OF ALZHEIMER'S DISEASE: ICD-10-CM

## 2024-08-21 DIAGNOSIS — R41.3 MEMORY LOSS: ICD-10-CM

## 2024-08-21 DIAGNOSIS — Z79.4 TYPE 2 DIABETES MELLITUS WITH DIABETIC NEUROPATHY, WITH LONG-TERM CURRENT USE OF INSULIN: Primary | ICD-10-CM

## 2024-08-21 DIAGNOSIS — E11.40 TYPE 2 DIABETES MELLITUS WITH DIABETIC NEUROPATHY, WITH LONG-TERM CURRENT USE OF INSULIN: Primary | ICD-10-CM

## 2024-08-21 DIAGNOSIS — I10 ESSENTIAL HYPERTENSION: ICD-10-CM

## 2024-08-21 DIAGNOSIS — E78.2 MIXED HYPERLIPIDEMIA: ICD-10-CM

## 2024-08-21 PROCEDURE — 99214 OFFICE O/P EST MOD 30 MIN: CPT | Performed by: INTERNAL MEDICINE

## 2024-08-21 PROCEDURE — 1126F AMNT PAIN NOTED NONE PRSNT: CPT | Performed by: INTERNAL MEDICINE

## 2024-08-21 RX ORDER — BLOOD-GLUCOSE SENSOR
1 EACH MISCELLANEOUS
Qty: 2 EACH | Refills: 0 | COMMUNITY
Start: 2024-08-21

## 2024-08-21 RX ORDER — DONEPEZIL HYDROCHLORIDE 5 MG/1
5 TABLET, FILM COATED ORAL NIGHTLY
Qty: 90 TABLET | Refills: 0 | Status: SHIPPED | OUTPATIENT
Start: 2024-08-21

## 2024-08-21 NOTE — PROGRESS NOTES
Lydia John is a 79 y.o. female, who presents with a chief complaint of   Chief Complaint   Patient presents with    Diabetes     Having trouble receiving glucose sensors on time. Would like to discuss using [facility] mail delivery pharmacy.    Handicap parking     Would like a Handicap parking pass           Diabetes  Pertinent negatives for hypoglycemia include no dizziness. Pertinent negatives for diabetes include no fatigue.      Pt here for follow up.   Her  daughter helps provide history.       Pt just had f/u with the heart failure clinic.  Her readings were up so she is on bumex currently    She is s/p CVA.  Every time she has a headache she is worried she is having a stroke.  She has intermittent headaches.  The headaches sometimes last for 5 minutes.       DM - pt has been using her freestyle marivel.  A1c per the cgm ins around 7.4->8.1.  she is not needing her SSI   during they day.  She uses ssi 10 of novolog insulin in the evening if her glucose is >200 then 18 units of tresiba nightly.     Recent stemi with secondary cva - pt on asa, atorvastatin, brilinta, bumex, spironolactone.  She is no longer on digoxin    Pt has been having more memory issues.  Fam hx alzheimer's disease and parkinson's and personal history of stroke.    The following portions of the patient's history were reviewed and updated as appropriate: allergies, current medications, past family history, past medical history, past social history, past surgical history and problem list.    Allergies: Hydrocodone-acetaminophen    Review of Systems   Constitutional:  Negative for fatigue.   HENT: Negative.     Eyes: Negative.    Respiratory: Negative.  Negative for cough.    Cardiovascular:  Negative for leg swelling.   Gastrointestinal: Negative.    Endocrine: Negative.    Genitourinary: Negative.    Musculoskeletal: Negative.    Skin: Negative.    Allergic/Immunologic: Negative.    Neurological:  Negative for dizziness.    Hematological: Negative.    Psychiatric/Behavioral: Negative.     All other systems reviewed and are negative.            Wt Readings from Last 3 Encounters:   08/21/24 75.2 kg (165 lb 12.8 oz)   05/21/24 74.8 kg (164 lb 12.8 oz)   02/13/24 73.9 kg (163 lb)     Temp Readings from Last 3 Encounters:   08/21/24 97.3 °F (36.3 °C) (Infrared)   05/21/24 98.2 °F (36.8 °C) (Infrared)   02/13/24 98.4 °F (36.9 °C) (Infrared)     BP Readings from Last 3 Encounters:   08/21/24 104/68   05/21/24 118/70   02/13/24 132/74     Pulse Readings from Last 3 Encounters:   08/21/24 51   05/21/24 64   02/13/24 76     Body mass index is 32.38 kg/m².  SpO2 Readings from Last 3 Encounters:   08/21/24 98%   05/21/24 98%   02/13/24 97%          Physical Exam  Vitals and nursing note reviewed.   Constitutional:       General: She is not in acute distress.     Appearance: She is well-developed.   HENT:      Head: Normocephalic and atraumatic.      Right Ear: External ear normal.      Left Ear: External ear normal.      Nose: Nose normal.   Eyes:      Conjunctiva/sclera: Conjunctivae normal.      Pupils: Pupils are equal, round, and reactive to light.   Cardiovascular:      Rate and Rhythm: Normal rate and regular rhythm.      Heart sounds: Normal heart sounds.   Pulmonary:      Effort: Pulmonary effort is normal. No respiratory distress.      Breath sounds: Normal breath sounds. No wheezing.   Musculoskeletal:         General: Normal range of motion.      Cervical back: Normal range of motion and neck supple.      Comments: In wheelchair   Skin:     General: Skin is warm and dry.   Neurological:      Mental Status: She is alert and oriented to person, place, and time.   Psychiatric:         Behavior: Behavior normal.         Thought Content: Thought content normal.         Judgment: Judgment normal.         Results for orders placed or performed in visit on 05/21/24   Comprehensive Metabolic Panel    Specimen: Blood   Result Value Ref Range     Glucose 139 (H) 70 - 99 mg/dL    BUN 26 8 - 27 mg/dL    Creatinine 0.87 0.57 - 1.00 mg/dL    EGFR Result 69 >59 mL/min/1.73    BUN/Creatinine Ratio 30 (H) 12 - 28    Sodium 134 134 - 144 mmol/L    Potassium 4.6 3.5 - 5.2 mmol/L    Chloride 102 96 - 106 mmol/L    Total CO2 17 (L) 20 - 29 mmol/L    Calcium 9.8 8.7 - 10.3 mg/dL    Total Protein 6.3 6.0 - 8.5 g/dL    Albumin 3.8 3.8 - 4.8 g/dL    Globulin 2.5 1.5 - 4.5 g/dL    A/G Ratio 1.5 1.2 - 2.2    Total Bilirubin 0.3 0.0 - 1.2 mg/dL    Alkaline Phosphatase 170 (H) 44 - 121 IU/L    AST (SGOT) 52 (H) 0 - 40 IU/L    ALT (SGPT) 59 (H) 0 - 32 IU/L   Lipid Panel With LDL / HDL Ratio    Specimen: Blood   Result Value Ref Range    Total Cholesterol 117 100 - 199 mg/dL    Triglycerides 125 0 - 149 mg/dL    HDL Cholesterol 39 (L) >39 mg/dL    VLDL Cholesterol Tyrell 22 5 - 40 mg/dL    LDL Chol Calc (NIH) 56 0 - 99 mg/dL    LDL/HDL RATIO 1.4 0.0 - 3.2 ratio   Hemoglobin A1c    Specimen: Blood   Result Value Ref Range    Hemoglobin A1C 8.1 (H) 4.8 - 5.6 %   T4, Free    Specimen: Blood   Result Value Ref Range    Free T4 1.40 0.82 - 1.77 ng/dL   TSH    Specimen: Blood   Result Value Ref Range    TSH 3.330 0.450 - 4.500 uIU/mL   Microalbumin / Creatinine Urine Ratio - Urine, Clean Catch    Specimen: Urine, Clean Catch   Result Value Ref Range    Creatinine, Urine CANCELED mg/dL    Microalbumin, Urine CANCELED    CBC & Differential    Specimen: Blood   Result Value Ref Range    WBC 11.8 (H) 3.4 - 10.8 x10E3/uL    RBC 4.18 3.77 - 5.28 x10E6/uL    Hemoglobin 11.4 11.1 - 15.9 g/dL    Hematocrit 35.6 34.0 - 46.6 %    MCV 85 79 - 97 fL    MCH 27.3 26.6 - 33.0 pg    MCHC 32.0 31.5 - 35.7 g/dL    RDW 14.9 11.7 - 15.4 %    Platelets 413 150 - 450 x10E3/uL    Neutrophil Rel % 68 Not Estab. %    Lymphocyte Rel % 21 Not Estab. %    Monocyte Rel % 5 Not Estab. %    Eosinophil Rel % 5 Not Estab. %    Basophil Rel % 0 Not Estab. %    Neutrophils Absolute 8.0 (H) 1.4 - 7.0 x10E3/uL     Lymphocytes Absolute 2.5 0.7 - 3.1 x10E3/uL    Monocytes Absolute 0.6 0.1 - 0.9 x10E3/uL    Eosinophils Absolute 0.6 (H) 0.0 - 0.4 x10E3/uL    Basophils Absolute 0.0 0.0 - 0.2 x10E3/uL    Immature Granulocyte Rel % 1 Not Estab. %    Immature Grans Absolute 0.1 0.0 - 0.1 x10E3/uL     Result Review :   The following data was reviewed by: Selena Davis MD on 08/21/2024:    Data reviewed : Consultant notes cardiology            Assessment and Plan    Diagnoses and all orders for this visit:    1. Type 2 diabetes mellitus with diabetic neuropathy, with long-term current use of insulin (Primary)  -     Continuous Glucose Sensor (FreeStyle Angela 3 Plus Sensor) misc; Use 1 each Every 10 (Ten) Days.  Dispense: 2 each; Refill: 0  -     CBC & Differential  -     Comprehensive Metabolic Panel  -     Hemoglobin A1c  -     Lipid Panel With LDL / HDL Ratio  -     T4, Free  -     TSH  -     Microalbumin / Creatinine Urine Ratio - Urine, Clean Catch    2. Memory loss  -     ATN Profile  -     donepezil (Aricept) 5 MG tablet; Take 1 tablet by mouth Every Night.  Dispense: 90 tablet; Refill: 0    3. Family history of Alzheimer's disease  -     ATN Profile    4. Essential hypertension  -     CBC & Differential  -     Comprehensive Metabolic Panel  -     Hemoglobin A1c  -     Lipid Panel With LDL / HDL Ratio  -     T4, Free  -     TSH  -     Microalbumin / Creatinine Urine Ratio - Urine, Clean Catch    5. Mixed hyperlipidemia  -     Comprehensive Metabolic Panel  -     Lipid Panel With LDL / HDL Ratio         BMI is >= 30 and <35. (Class 1 Obesity). The following options were offered after discussion;: exercise counseling/recommendations and nutrition counseling/recommendations             Outpatient Medications Prior to Visit   Medication Sig Dispense Refill    acetaminophen (TYLENOL) 500 MG tablet Take 1 tablet by mouth Every 6 (Six) Hours As Needed for Mild Pain. PRN      Aspirin Low Dose 81 MG EC tablet Take 1 tablet by mouth  Daily. 90 tablet 3    atorvastatin (LIPITOR) 80 MG tablet Take 1 tablet by mouth Daily. 90 tablet 3    BD Pen Needle Micro U/F 32G X 6 MM misc USE 1 PEN NEEDLE AS DIRECTED 100 each 0    Brilinta 90 MG tablet tablet Take 1 tablet by mouth 2 (Two) Times a Day. 180 tablet 3    bumetanide (BUMEX) 1 MG tablet Take 1 tablet by mouth Daily. 90 tablet 3    calcium citrate-vitamin d (CITRACAL) 200-250 MG-UNIT tablet tablet Take 1 tablet by mouth Daily.      Continuous Blood Gluc Sensor (FreeStyle Angela Sensor System) Use Every 10 (Ten) Days. 9 each 3    DULoxetine (CYMBALTA) 60 MG capsule Take 1 capsule by mouth once daily 90 capsule 0    glucose blood (Accu-Chek Guide) test strip 1 each by Other route 2 (Two) Times a Day. Use as instructed 200 each 4    metoprolol succinate XL (TOPROL-XL) 25 MG 24 hr tablet Take 0.5 tablets by mouth Daily. 45 tablet 3    NovoLOG FlexPen 100 UNIT/ML solution pen-injector sc pen Inject 15 Units under the skin into the appropriate area as directed 4 (Four) Times a Day. (Patient taking differently: Inject 10 Units under the skin into the appropriate area as directed 4 (Four) Times a Day.) 60 mL 3    pantoprazole (PROTONIX) 40 MG EC tablet Take 1 tablet by mouth 2 (Two) Times a Day. 180 tablet 1    spironolactone (ALDACTONE) 25 MG tablet Take 0.5 tablets by mouth Every Other Day. 45 tablet 3    Tresiba FlexTouch 100 UNIT/ML solution pen-injector injection Inject 35 Units under the skin into the appropriate area as directed Every Night. (Patient taking differently: Inject 18 Units under the skin into the appropriate area as directed Every Night.) 35 mL 3    vitamin D (ERGOCALCIFEROL) 1.25 MG (19961 UT) capsule capsule Take 1 capsule by mouth once a week 13 capsule 0     No facility-administered medications prior to visit.     New Medications Ordered This Visit   Medications    Continuous Glucose Sensor (FreeStyle Angela 3 Plus Sensor) misc     Sig: Use 1 each Every 10 (Ten) Days.     Dispense:  2  each     Refill:  0     Order Specific Question:   Lot Number?     Answer:   [id number]     Order Specific Question:   Expiration Date?     Answer:   8/31/2024     Order Specific Question:   Quantity     Answer:   2    donepezil (Aricept) 5 MG tablet     Sig: Take 1 tablet by mouth Every Night.     Dispense:  90 tablet     Refill:  0     [unfilled]  There are no discontinued medications.      No follow-ups on file.    Patient was given instructions and counseling regarding her condition or for health maintenance advice. Please see specific information pulled into the AVS if appropriate.

## 2024-08-24 LAB
A -- BETA-AMYLOID 42/40 RATIO: 0.13
AL BETA40 PLAS-MCNC: 239.96 PG/ML
AL BETA42 PLAS-MCNC: 30.35 PG/ML
ALBUMIN SERPL-MCNC: 4.2 G/DL (ref 3.8–4.8)
ALBUMIN/CREAT UR: <7 MG/G CREAT (ref 0–29)
ALP SERPL-CCNC: 101 IU/L (ref 44–121)
ALT SERPL-CCNC: 23 IU/L (ref 0–32)
AST SERPL-CCNC: 22 IU/L (ref 0–40)
ATN SUMMARY1: ABNORMAL
BASOPHILS # BLD AUTO: 0 X10E3/UL (ref 0–0.2)
BASOPHILS NFR BLD AUTO: 0 %
BILIRUB SERPL-MCNC: 0.6 MG/DL (ref 0–1.2)
BUN SERPL-MCNC: 29 MG/DL (ref 8–27)
BUN/CREAT SERPL: 25 (ref 12–28)
CALCIUM SERPL-MCNC: 10.4 MG/DL (ref 8.7–10.3)
CHLORIDE SERPL-SCNC: 94 MMOL/L (ref 96–106)
CHOLEST SERPL-MCNC: 124 MG/DL (ref 100–199)
CO2 SERPL-SCNC: 26 MMOL/L (ref 20–29)
CREAT SERPL-MCNC: 1.16 MG/DL (ref 0.57–1)
CREAT UR-MCNC: 40.2 MG/DL
EGFRCR SERPLBLD CKD-EPI 2021: 48 ML/MIN/1.73
EOSINOPHIL # BLD AUTO: 0.5 X10E3/UL (ref 0–0.4)
EOSINOPHIL NFR BLD AUTO: 5 %
ERYTHROCYTE [DISTWIDTH] IN BLOOD BY AUTOMATED COUNT: 16 % (ref 11.7–15.4)
GLOBULIN SER CALC-MCNC: 2.5 G/DL (ref 1.5–4.5)
GLUCOSE SERPL-MCNC: 193 MG/DL (ref 70–99)
HBA1C MFR BLD: 8.9 % (ref 4.8–5.6)
HCT VFR BLD AUTO: 37.8 % (ref 34–46.6)
HDLC SERPL-MCNC: 44 MG/DL
HGB BLD-MCNC: 11.1 G/DL (ref 11.1–15.9)
IMM GRANULOCYTES # BLD AUTO: 0.1 X10E3/UL (ref 0–0.1)
IMM GRANULOCYTES NFR BLD AUTO: 1 %
INFORMATION:: ABNORMAL
LDLC SERPL CALC-MCNC: 57 MG/DL (ref 0–99)
LDLC/HDLC SERPL: 1.3 RATIO (ref 0–3.2)
LYMPHOCYTES # BLD AUTO: 2.4 X10E3/UL (ref 0.7–3.1)
LYMPHOCYTES NFR BLD AUTO: 22 %
MCH RBC QN AUTO: 24.1 PG (ref 26.6–33)
MCHC RBC AUTO-ENTMCNC: 29.4 G/DL (ref 31.5–35.7)
MCV RBC AUTO: 82 FL (ref 79–97)
MICROALBUMIN UR-MCNC: <3 UG/ML
MONOCYTES # BLD AUTO: 0.7 X10E3/UL (ref 0.1–0.9)
MONOCYTES NFR BLD AUTO: 6 %
N -- NFL, PLASMA: 12 PG/ML (ref 0–7.64)
NEUTROPHILS # BLD AUTO: 7.1 X10E3/UL (ref 1.4–7)
NEUTROPHILS NFR BLD AUTO: 66 %
PLATELET # BLD AUTO: 320 X10E3/UL (ref 150–450)
POTASSIUM SERPL-SCNC: 4.7 MMOL/L (ref 3.5–5.2)
PROT SERPL-MCNC: 6.7 G/DL (ref 6–8.5)
RBC # BLD AUTO: 4.6 X10E6/UL (ref 3.77–5.28)
SODIUM SERPL-SCNC: 136 MMOL/L (ref 134–144)
T -- P-TAU181: 1.53 PG/ML (ref 0–0.97)
T4 FREE SERPL-MCNC: 1.27 NG/DL (ref 0.82–1.77)
TRIGL SERPL-MCNC: 128 MG/DL (ref 0–149)
TSH SERPL DL<=0.005 MIU/L-ACNC: 3.94 UIU/ML (ref 0.45–4.5)
VLDLC SERPL CALC-MCNC: 23 MG/DL (ref 5–40)
WBC # BLD AUTO: 10.8 X10E3/UL (ref 3.4–10.8)

## 2024-10-19 DIAGNOSIS — E11.44 DIABETIC AMYOTROPHY ASSOCIATED WITH TYPE 2 DIABETES MELLITUS: ICD-10-CM

## 2024-10-19 DIAGNOSIS — F43.21 SITUATIONAL DEPRESSION: ICD-10-CM

## 2024-10-21 RX ORDER — PEN NEEDLE, DIABETIC 32 GX 1/4"
NEEDLE, DISPOSABLE MISCELLANEOUS
Qty: 100 EACH | Refills: 1 | Status: SHIPPED | OUTPATIENT
Start: 2024-10-21

## 2024-10-21 RX ORDER — DULOXETIN HYDROCHLORIDE 60 MG/1
60 CAPSULE, DELAYED RELEASE ORAL DAILY
Qty: 90 CAPSULE | Refills: 1 | Status: SHIPPED | OUTPATIENT
Start: 2024-10-21

## 2024-10-21 NOTE — TELEPHONE ENCOUNTER
Rx Refill Note  Requested Prescriptions     Pending Prescriptions Disp Refills    Insulin Pen Needle (BD Pen Needle Micro U/F) 32G X 6 MM misc [Pharmacy Med Name: BD [id number] MIS] 100 each 1     Sig: USE 1 PEN NEEDLE AS DIRECTED    DULoxetine (CYMBALTA) 60 MG capsule [Pharmacy Med Name: DULoxetine HCl 60 MG Oral Capsule Delayed Release Particles] 90 capsule 1     Sig: Take 1 capsule by mouth once daily      Last office visit with prescribing clinician: 8/21/2024   Last telemedicine visit with prescribing clinician: Visit date not found   Next office visit with prescribing clinician: 11/19/2024                         Would you like a call back once the refill request has been completed: [] Yes [] No    If the office needs to give you a call back, can they leave a voicemail: [] Yes [] No    Arian Medina MA  10/21/24, 10:46 EDT

## 2024-11-25 ENCOUNTER — OFFICE VISIT (OUTPATIENT)
Dept: INTERNAL MEDICINE | Facility: CLINIC | Age: 79
End: 2024-11-25
Payer: MEDICARE

## 2024-11-25 VITALS
TEMPERATURE: 97.8 F | BODY MASS INDEX: 31.64 KG/M2 | DIASTOLIC BLOOD PRESSURE: 60 MMHG | HEART RATE: 56 BPM | SYSTOLIC BLOOD PRESSURE: 110 MMHG | OXYGEN SATURATION: 97 % | WEIGHT: 162 LBS

## 2024-11-25 DIAGNOSIS — Z79.4 TYPE 2 DIABETES MELLITUS WITH HYPOGLYCEMIA WITHOUT COMA, WITH LONG-TERM CURRENT USE OF INSULIN: ICD-10-CM

## 2024-11-25 DIAGNOSIS — I10 ESSENTIAL HYPERTENSION: ICD-10-CM

## 2024-11-25 DIAGNOSIS — Z00.00 MEDICARE ANNUAL WELLNESS VISIT, SUBSEQUENT: Primary | ICD-10-CM

## 2024-11-25 DIAGNOSIS — E11.649 TYPE 2 DIABETES MELLITUS WITH HYPOGLYCEMIA WITHOUT COMA, WITH LONG-TERM CURRENT USE OF INSULIN: ICD-10-CM

## 2024-11-25 DIAGNOSIS — I25.10 CHRONIC CORONARY ARTERY DISEASE: ICD-10-CM

## 2024-11-25 DIAGNOSIS — E78.2 MIXED HYPERLIPIDEMIA: ICD-10-CM

## 2024-11-25 DIAGNOSIS — E11.44 TYPE 2 DIABETES MELLITUS WITH DIABETIC AMYOTROPHY, WITHOUT LONG-TERM CURRENT USE OF INSULIN: ICD-10-CM

## 2024-11-25 DIAGNOSIS — Z23 NEED FOR VACCINATION: ICD-10-CM

## 2024-11-25 PROCEDURE — G0439 PPPS, SUBSEQ VISIT: HCPCS | Performed by: INTERNAL MEDICINE

## 2024-11-25 PROCEDURE — 99214 OFFICE O/P EST MOD 30 MIN: CPT | Performed by: INTERNAL MEDICINE

## 2024-11-25 PROCEDURE — 1160F RVW MEDS BY RX/DR IN RCRD: CPT | Performed by: INTERNAL MEDICINE

## 2024-11-25 PROCEDURE — 1159F MED LIST DOCD IN RCRD: CPT | Performed by: INTERNAL MEDICINE

## 2024-11-25 PROCEDURE — 90662 IIV NO PRSV INCREASED AG IM: CPT | Performed by: INTERNAL MEDICINE

## 2024-11-25 PROCEDURE — 1126F AMNT PAIN NOTED NONE PRSNT: CPT | Performed by: INTERNAL MEDICINE

## 2024-11-25 PROCEDURE — G0008 ADMIN INFLUENZA VIRUS VAC: HCPCS | Performed by: INTERNAL MEDICINE

## 2024-11-25 RX ORDER — EMPAGLIFLOZIN 10 MG/1
10 TABLET, FILM COATED ORAL DAILY
COMMUNITY

## 2024-11-25 NOTE — PATIENT INSTRUCTIONS
Medicare Wellness  Personal Prevention Plan of Service     Date of Office Visit:    Encounter Provider:  Selena Davis MD  Place of Service:  [facility]  Patient Name: Lydia John  :  1945    As part of the Medicare Wellness portion of your visit today, we are providing you with this personalized preventive plan of services (PPPS). This plan is based upon recommendations of the [place] Preventive Services Task Force (USPSTF) and the Advisory Committee on Immunization Practices (ACIP).    This lists the preventive care services that should be considered, and provides dates of when you are due. Items listed as completed are up-to-date and do not require any further intervention.    Health Maintenance   Topic Date Due    TDAP/TD VACCINES (1 - Tdap) Never done    ZOSTER VACCINE (2 of 2) 2010    COVID-19 Vaccine (4 - - season) 2025 (Originally 2024)    RSV Vaccine - Adults (1 - 1-dose 75+ series) 2025 (Originally 2021)    HEMOGLOBIN A1C  2025    DXA SCAN  2025    URINE MICROALBUMIN  2025    BMI FOLLOWUP  2025    ANNUAL WELLNESS VISIT  2025    LIPID PANEL  2025    COLORECTAL CANCER SCREENING  2029    HEPATITIS C SCREENING  Completed    INFLUENZA VACCINE  Completed    Pneumococcal Vaccine 65+  Completed    MAMMOGRAM  Discontinued    DIABETIC EYE EXAM  Discontinued       Orders Placed This Encounter   Procedures    Fluzone High-Dose 65+yrs    Comprehensive Metabolic Panel     Order Specific Question:   Release to patient     Answer:   Routine Release [[id number]]     Order Specific Question:   [facility] Has the patient fasted?     Answer:   No    Hemoglobin A1c     Order Specific Question:   Release to patient     Answer:   Routine Release [[id number]]     Order Specific Question:   [facility] Has the patient fasted?     Answer:   No    Lipid Panel With LDL / HDL Ratio     Order Specific Question:    Release to patient     Answer:   Routine Release [[id number]]     Order Specific Question:   [facility] Has the patient fasted?     Answer:   No    T4, Free     Order Specific Question:   Release to patient     Answer:   Routine Release [[id number]]     Order Specific Question:   [facility] Has the patient fasted?     Answer:   No    TSH     Order Specific Question:   Release to patient     Answer:   Routine Release [[id number]]     Order Specific Question:   [facility] Has the patient fasted?     Answer:   No    CBC & Differential     Order Specific Question:   Manual Differential     Answer:   No     Order Specific Question:   Release to patient     Answer:   Routine Release [[id number]]     Order Specific Question:   [facility] Has the patient fasted?     Answer:   No       Return in about 3 months (around 2/25/2025) for Recheck, labs.

## 2024-11-25 NOTE — ASSESSMENT & PLAN NOTE
Orders:    empagliflozin (Jardiance) 25 MG tablet tablet; Take 1 tablet by mouth Daily.    CBC & Differential    Comprehensive Metabolic Panel    Hemoglobin A1c    Lipid Panel With LDL / HDL Ratio    T4, Free    TSH    Continuous Glucose Sensor (Dexcom G7 Sensor) misc; Use 1 each Every 10 (Ten) Days.    Continuous Glucose  (Dexcom G7 ) device; Use 1 each Take As Directed.

## 2024-11-25 NOTE — PROGRESS NOTES
Subjective   The ABCs of the Annual Wellness Visit  Medicare Wellness Visit      Lydia John is a 79 y.o. patient who presents for a Medicare Wellness Visit.    The following portions of the patient's history were reviewed and   updated as appropriate: allergies, current medications, past family history, past medical history, past social history, past surgical history, and problem list.    Compared to one year ago, the patient's physical   health is the same.  Compared to one year ago, the patient's mental   health is the same.    Recent Hospitalizations:  She was admitted within the past 365 days at [facility].     Current Medical Providers:  Patient Care Team:  Selena Davis MD as PCP - General (Internal Medicine & Pediatrics)  Nikolay Vazquez MD as Consulting Physician (Neurology)  Marisa Carranza MD (Cardiology)    Outpatient Medications Prior to Visit   Medication Sig Dispense Refill    acetaminophen (TYLENOL) 500 MG tablet Take 1 tablet by mouth Every 6 (Six) Hours As Needed for Mild Pain. PRN      Aspirin Low Dose 81 MG EC tablet Take 1 tablet by mouth Daily. 90 tablet 3    atorvastatin (LIPITOR) 80 MG tablet Take 1 tablet by mouth Daily. 90 tablet 3    Brilinta 90 MG tablet tablet Take 1 tablet by mouth 2 (Two) Times a Day. 180 tablet 3    calcium citrate-vitamin d (CITRACAL) 200-250 MG-UNIT tablet tablet Take 1 tablet by mouth Daily.      DULoxetine (CYMBALTA) 60 MG capsule Take 1 capsule by mouth once daily 90 capsule 1    glucose blood (Accu-Chek Guide) test strip 1 each by Other route 2 (Two) Times a Day. Use as instructed 200 each 4    Insulin Pen Needle (BD Pen Needle Micro U/F) 32G X 6 MM misc USE 1 PEN NEEDLE AS DIRECTED 100 each 1    Jardiance 10 MG tablet tablet Take 1 tablet by mouth Daily.      metoprolol succinate XL (TOPROL-XL) 25 MG 24 hr tablet Take 0.5 tablets by mouth Daily. 45 tablet 3    NovoLOG FlexPen 100 UNIT/ML solution pen-injector sc pen Inject 15 Units  under the skin into the appropriate area as directed 4 (Four) Times a Day. (Patient taking differently: Inject 10 Units under the skin into the appropriate area as directed 4 (Four) Times a Day.) 60 mL 3    pantoprazole (PROTONIX) 40 MG EC tablet Take 1 tablet by mouth 2 (Two) Times a Day. 180 tablet 1    spironolactone (ALDACTONE) 25 MG tablet Take 0.5 tablets by mouth Every Other Day. 45 tablet 3    Tresiba FlexTouch 100 UNIT/ML solution pen-injector injection Inject 35 Units under the skin into the appropriate area as directed Every Night. (Patient taking differently: Inject 18 Units under the skin into the appropriate area as directed Every Night.) 35 mL 3    vitamin D (ERGOCALCIFEROL) 1.25 MG (71147 UT) capsule capsule Take 1 capsule by mouth once a week 13 capsule 0    Continuous Blood Gluc Sensor (FreeStyle Angela Sensor System) Use Every 10 (Ten) Days. 9 each 3    Continuous Glucose Sensor (FreeStyle Angela 3 Plus Sensor) misc Use 1 each Every 10 (Ten) Days. 2 each 0    bumetanide (BUMEX) 1 MG tablet Take 1 tablet by mouth Daily. (Patient not taking: Reported on 11/25/2024) 90 tablet 3    donepezil (Aricept) 5 MG tablet Take 1 tablet by mouth Every Night. (Patient not taking: Reported on 11/25/2024) 90 tablet 0     No facility-administered medications prior to visit.     No opioid medication identified on active medication list. I have reviewed chart for other potential  high risk medication/s and harmful drug interactions in the elderly.      Aspirin is on active medication list. Aspirin use is indicated based on review of current medical condition/s. Pros and cons of this therapy have been discussed today. Benefits of this medication outweigh potential harm.  Patient has been encouraged to continue taking this medication.  .      Patient Active Problem List   Diagnosis    Peripheral polyneuropathy    Type 2 diabetes mellitus    Left foot pain    Post-menopausal    Breast cancer screening    Diabetic  "amyotrophy    Proximal limb muscle weakness    Hyponatremia    Situational depression    Mixed hyperlipidemia    Vitamin D deficiency    Paraparesis    Weakness of lower extremity    Muscle strain of right wrist    Osteopenia    High risk for hip fracture     Advance Care Planning Advance Directive is not on file.  ACP discussion was held with the patient during this visit. Patient has an advance directive (not in EMR), copy requested.            Objective   Vitals:    24 1516   BP: 110/60   BP Location: Right arm   Patient Position: Sitting   Cuff Size: Adult   Pulse: 56   Temp: 97.8 °F (36.6 °C)   TempSrc: Infrared   SpO2: 97%   Weight: 73.5 kg (162 lb)       Estimated body mass index is 31.64 kg/m² as calculated from the following:    Height as of 24: 152.4 cm (60\").    Weight as of this encounter: 73.5 kg (162 lb).            Does the patient have evidence of cognitive impairment? No  Lab Results   Component Value Date    CHLPL 124 2024    TRIG 90 2024    HDL 50 2024    LDL 57 2024    VLDL 17 2024    HGBA1C 8.70 (H) 2024                                                                                                Health  Risk Assessment    Smoking Status:  Social History     Tobacco Use   Smoking Status Never    Passive exposure: Never   Smokeless Tobacco Never     Alcohol Consumption:  Social History     Substance and Sexual Activity   Alcohol Use No       Fall Risk Screen  STEADI Fall Risk Assessment was completed, and patient is at MODERATE risk for falls. Assessment completed on:2024    Depression Screening   Little interest or pleasure in doing things? Not at all   Feeling down, depressed, or hopeless? Not at all   PHQ-2 Total Score 0       Health Habits and Functional and Cognitive Screenin/25/2024     3:00 PM   Functional & Cognitive Status   Do you have difficulty preparing food and eating? No   Do you have difficulty bathing yourself, " getting dressed or grooming yourself? No   Do you have difficulty using the toilet? No   Do you have difficulty moving around from place to place? No   Do you have trouble with steps or getting out of a bed or a chair? No   Current Diet Well Balanced Diet   Dental Exam Up to date   Eye Exam Up to date   Exercise (times per week) 0 times per week   Current Exercises Include No Regular Exercise   Do you need help using the phone?  No   Are you deaf or do you have serious difficulty hearing?  No   Do you need help to go to places out of walking distance? Yes   Do you need help shopping? Yes   Do you need help preparing meals?  Yes   Do you need help with housework?  Yes   Do you need help with laundry? Yes   Do you need help taking your medications? Yes   Do you need help managing money? Yes   Do you ever drive or ride in a car without wearing a seat belt? No   Have you felt unusual stress, anger or loneliness in the last month? No   Who do you live with? Spouse   If you need help, do you have trouble finding someone available to you? No   Have you been bothered in the last four weeks by sexual problems? No   Do you have difficulty concentrating, remembering or making decisions? No           Age-appropriate Screening Schedule:  Refer to the list below for future screening recommendations based on patient's age, sex and/or medical conditions. Orders for these recommended tests are listed in the plan section. The patient has been provided with a written plan.    Health Maintenance List  Health Maintenance   Topic Date Due    TDAP/TD VACCINES (1 - Tdap) Never done    ZOSTER VACCINE (2 of 2) 04/08/2010    COVID-19 Vaccine (4 - 2024-25 season) 02/14/2025 (Originally 9/1/2024)    RSV Vaccine - Adults (1 - 1-dose 75+ series) 05/21/2025 (Originally 7/14/2021)    HEMOGLOBIN A1C  05/25/2025    DXA SCAN  08/18/2025    URINE MICROALBUMIN  08/21/2025    BMI FOLLOWUP  08/21/2025    ANNUAL WELLNESS VISIT  11/25/2025    LIPID PANEL   11/25/2025    COLORECTAL CANCER SCREENING  04/30/2029    HEPATITIS C SCREENING  Completed    INFLUENZA VACCINE  Completed    Pneumococcal Vaccine 65+  Completed    MAMMOGRAM  Discontinued    DIABETIC EYE EXAM  Discontinued                                                                                                                                                CMS Preventative Services Quick Reference  Risk Factors Identified During Encounter  Fall Risk-High or Moderate: Discussed Fall Prevention in the home  Immunizations Discussed/Encouraged: Tdap, Shingrix, COVID19, and RSV (Respiratory Syncytial Virus)  Inactivity/Sedentary:  increase exercise as tolerated  Polypharmacy: Medication List reviewed and Medication changes were made    The above risks/problems have been discussed with the patient.  Pertinent information has been shared with the patient in the After Visit Summary.  An After Visit Summary and PPPS were made available to the patient.    Follow Up:   Next Medicare Wellness visit to be scheduled in 1 year.         Additional E&M Note during same encounter follows:  Patient has additional, significant, and separately identifiable condition(s)/problem(s) that require work above and beyond the Medicare Wellness Visit     Chief Complaint  Diabetes (3 month f/u )    Subjective   Diabetes      Lydia is also being seen today for additional medical problem/s.    Review of Systems   Constitutional: Negative.    HENT: Negative.     Eyes: Negative.    Respiratory: Negative.     Cardiovascular: Negative.    Gastrointestinal: Negative.    Endocrine: Negative.    Musculoskeletal: Negative.    Skin: Negative.    Allergic/Immunologic: Negative.    Neurological: Negative.    Hematological: Negative.    Psychiatric/Behavioral: Negative.     All other systems reviewed and are negative.   Pt here for follow up with her daughter    Memory concerns - pt started aricept and had depression.  She was crying and not herself.   She stopped the med and is feeling better    T2DM - pt uses freestyle marivel and expected A1c is 8.1.  she is taking tresiba 18 units nightly.  She has novolog to take with meals but this has been harder from a logistic standpoint.  Pt has high glucoses during the day and low at night.  She started jardiance on 11/12.        Objective   Vital Signs:  /60 (BP Location: Right arm, Patient Position: Sitting, Cuff Size: Adult)   Pulse 56   Temp 97.8 °F (36.6 °C) (Infrared)   Wt 73.5 kg (162 lb)   SpO2 97%   BMI 31.64 kg/m²   Physical Exam  Vitals and nursing note reviewed.   Constitutional:       General: She is not in acute distress.     Appearance: She is well-developed.   HENT:      Head: Normocephalic and atraumatic.      Right Ear: External ear normal.      Left Ear: External ear normal.      Nose: Nose normal.   Eyes:      Conjunctiva/sclera: Conjunctivae normal.      Pupils: Pupils are equal, round, and reactive to light.   Cardiovascular:      Rate and Rhythm: Normal rate and regular rhythm.      Heart sounds: Normal heart sounds.   Pulmonary:      Effort: Pulmonary effort is normal. No respiratory distress.      Breath sounds: Normal breath sounds. No wheezing.   Musculoskeletal:      Cervical back: Normal range of motion and neck supple.      Comments: In wheelchair   Skin:     General: Skin is warm and dry.   Neurological:      Mental Status: She is alert and oriented to person, place, and time.   Psychiatric:         Behavior: Behavior normal.         Thought Content: Thought content normal.         Judgment: Judgment normal.          The following data was reviewed by: Selena Davis MD on 11/25/2024:  Data reviewed : Consultant notes cardiology and Recent hospitalization notes hospital notes from admission earlier this year (cholecystitis)  Common labs          5/21/2024    15:29 8/21/2024    15:04 11/25/2024    16:21   Common Labs   Glucose 139  193  148    BUN 26  29  24    Creatinine 0.87   1.16  0.92    Sodium 134  136  137    Potassium 4.6  4.7  4.5    Chloride 102  94  101    Calcium 9.8  10.4  9.5    Total Protein 6.3  6.7  6.2    Albumin 3.8  4.2  3.8    Total Bilirubin 0.3  0.6  0.4    Alkaline Phosphatase 170  101  122    AST (SGOT) 52  22  20    ALT (SGPT) 59  23  22    WBC 11.8  10.8  9.71    Hemoglobin 11.4  11.1  11.1    Hematocrit 35.6  37.8  35.9    Platelets 413  320  296    Total Cholesterol 117  124  124    Triglycerides 125  128  90    HDL Cholesterol 39  44  50    LDL Cholesterol  56  57  57    Hemoglobin A1C 8.1  8.9  8.70    Microalbumin, Urine CANCELED  <3.0               Assessment and Plan Additional age appropriate preventative wellness advice topics were discussed during today's preventative wellness exam(some topics already addressed during AWV portion of the note above):    Physical Activity: Advised cardiovascular activity 150 minutes per week as tolerated. (example brisk walk for 30 minutes, 5 days a week).     Nutrition: Discussed nutrition plan with patient. Information shared in after visit summary. Goal is for a well balanced diet to enhance overall health.     Healthy Weight: Discussed current and goal BMI with patient. Steps to attain this goal discussed. Information shared in after visit summary.    Diagnoses and all orders for this visit:    1. Medicare annual wellness visit, subsequent (Primary)    2. Need for vaccination  -     Fluzone High-Dose 65+yrs    3. Mixed hyperlipidemia - cont high dose atorvastatin  Assessment & Plan:      Orders:  -     Comprehensive Metabolic Panel  -     Lipid Panel With LDL / HDL Ratio    4. 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  Goal is to decrease insulin to help prevent hypoglycemia.      Orders:  -     empagliflozin (Jardiance) 25 MG tablet tablet; Take 1 tablet by  mouth Daily.  Dispense: 28 tablet; Refill: 0  -     CBC & Differential  -     Comprehensive Metabolic Panel  -     Hemoglobin A1c  -     Lipid Panel With LDL / HDL Ratio  -     T4, Free  -     TSH  -     Continuous Glucose Sensor (Dexcom G7 Sensor) misc; Use 1 each Every 10 (Ten) Days.  Dispense: 9 each; Refill: 4  -     Continuous Glucose  (Dexcom G7 ) device; Use 1 each Take As Directed.  Dispense: 1 each; Refill: 0  5. Essential hypertension  -     empagliflozin (Jardiance) 25 MG tablet tablet; Take 1 tablet by mouth Daily.  Dispense: 28 tablet; Refill: 0  -     CBC & Differential  -     Comprehensive Metabolic Panel  -     Hemoglobin A1c  -     Lipid Panel With LDL / HDL Ratio  -     T4, Free  -     TSH    6. Chronic coronary artery disease - cont asa, atorvastatin, brilinta, metoprolol, spironolactone, jardiance  -     empagliflozin (Jardiance) 25 MG tablet tablet; Take 1 tablet by mouth Daily.  Dispense: 28 tablet; Refill: 0  -     CBC & Differential  -     Comprehensive Metabolic Panel  -     Hemoglobin A1c  -     Lipid Panel With LDL / HDL Ratio  -     T4, Free  -     TSH    7. Type 2 diabetes mellitus with hypoglycemia without coma, with long-term current use of insulin  Assessment & Plan:      Orders:    empagliflozin (Jardiance) 25 MG tablet tablet; Take 1 tablet by mouth Daily.    CBC & Differential    Comprehensive Metabolic Panel    Hemoglobin A1c    Lipid Panel With LDL / HDL Ratio    T4, Free    TSH      Orders:  -     Continuous Glucose Sensor (Dexcom G7 Sensor) misc; Use 1 each Every 10 (Ten) Days.  Dispense: 9 each; Refill: 4  -     Continuous Glucose  (Dexcom G7 ) device; Use 1 each Take As Directed.  Dispense: 1 each; Refill: 0          Need for vaccination    Orders:    Fluzone High-Dose 65+yrs    Medicare annual wellness visit, subsequent         Mixed hyperlipidemia       Orders:    Comprehensive Metabolic Panel    Lipid Panel With LDL / HDL Ratio    Type 2  diabetes mellitus with diabetic amyotrophy, without long-term current use of insulin      Orders:    empagliflozin (Jardiance) 25 MG tablet tablet; Take 1 tablet by mouth Daily.    CBC & Differential    Comprehensive Metabolic Panel    Hemoglobin A1c    Lipid Panel With LDL / HDL Ratio    T4, Free    TSH    Continuous Glucose Sensor (Dexcom G7 Sensor) misc; Use 1 each Every 10 (Ten) Days.    Continuous Glucose  (Dexcom G7 ) device; Use 1 each Take As Directed.    Essential hypertension      Orders:    empagliflozin (Jardiance) 25 MG tablet tablet; Take 1 tablet by mouth Daily.    CBC & Differential    Comprehensive Metabolic Panel    Hemoglobin A1c    Lipid Panel With LDL / HDL Ratio    T4, Free    TSH    Chronic coronary artery disease      Orders:    empagliflozin (Jardiance) 25 MG tablet tablet; Take 1 tablet by mouth Daily.    CBC & Differential    Comprehensive Metabolic Panel    Hemoglobin A1c    Lipid Panel With LDL / HDL Ratio    T4, Free    TSH    Type 2 diabetes mellitus with hypoglycemia without coma, with long-term current use of insulin      Orders:    Continuous Glucose Sensor (Dexcom G7 Sensor) misc; Use 1 each Every 10 (Ten) Days.    Continuous Glucose  (Dexcom G7 ) device; Use 1 each Take As Directed.            Follow Up   Return in about 3 months (around 2/25/2025) for Recheck, labs.  Patient was given instructions and counseling regarding her condition or for health maintenance advice. Please see specific information pulled into the AVS if appropriate.

## 2024-11-26 LAB
ALBUMIN SERPL-MCNC: 3.8 G/DL (ref 3.5–5.2)
ALBUMIN/GLOB SERPL: 1.6 G/DL
ALP SERPL-CCNC: 122 U/L (ref 39–117)
ALT SERPL-CCNC: 22 U/L (ref 1–33)
AST SERPL-CCNC: 20 U/L (ref 1–32)
BASOPHILS # BLD AUTO: 0.03 10*3/MM3 (ref 0–0.2)
BASOPHILS NFR BLD AUTO: 0.3 % (ref 0–1.5)
BILIRUB SERPL-MCNC: 0.4 MG/DL (ref 0–1.2)
BUN SERPL-MCNC: 24 MG/DL (ref 8–23)
BUN/CREAT SERPL: 26.1 (ref 7–25)
CALCIUM SERPL-MCNC: 9.5 MG/DL (ref 8.6–10.5)
CHLORIDE SERPL-SCNC: 101 MMOL/L (ref 98–107)
CHOLEST SERPL-MCNC: 124 MG/DL (ref 0–200)
CO2 SERPL-SCNC: 23.7 MMOL/L (ref 22–29)
CREAT SERPL-MCNC: 0.92 MG/DL (ref 0.57–1)
EGFRCR SERPLBLD CKD-EPI 2021: 63.9 ML/MIN/1.73
EOSINOPHIL # BLD AUTO: 0.43 10*3/MM3 (ref 0–0.4)
EOSINOPHIL NFR BLD AUTO: 4.4 % (ref 0.3–6.2)
ERYTHROCYTE [DISTWIDTH] IN BLOOD BY AUTOMATED COUNT: 16.8 % (ref 12.3–15.4)
GLOBULIN SER CALC-MCNC: 2.4 GM/DL
GLUCOSE SERPL-MCNC: 148 MG/DL (ref 65–99)
HBA1C MFR BLD: 8.7 % (ref 4.8–5.6)
HCT VFR BLD AUTO: 35.9 % (ref 34–46.6)
HDLC SERPL-MCNC: 50 MG/DL (ref 40–60)
HGB BLD-MCNC: 11.1 G/DL (ref 12–15.9)
IMM GRANULOCYTES # BLD AUTO: 0.04 10*3/MM3 (ref 0–0.05)
IMM GRANULOCYTES NFR BLD AUTO: 0.4 % (ref 0–0.5)
LDLC SERPL CALC-MCNC: 57 MG/DL (ref 0–100)
LDLC/HDLC SERPL: 1.12 {RATIO}
LYMPHOCYTES # BLD AUTO: 2.58 10*3/MM3 (ref 0.7–3.1)
LYMPHOCYTES NFR BLD AUTO: 26.6 % (ref 19.6–45.3)
MCH RBC QN AUTO: 24.4 PG (ref 26.6–33)
MCHC RBC AUTO-ENTMCNC: 30.9 G/DL (ref 31.5–35.7)
MCV RBC AUTO: 78.9 FL (ref 79–97)
MONOCYTES # BLD AUTO: 0.6 10*3/MM3 (ref 0.1–0.9)
MONOCYTES NFR BLD AUTO: 6.2 % (ref 5–12)
NEUTROPHILS # BLD AUTO: 6.03 10*3/MM3 (ref 1.7–7)
NEUTROPHILS NFR BLD AUTO: 62.1 % (ref 42.7–76)
NRBC BLD AUTO-RTO: 0 /100 WBC (ref 0–0.2)
PLATELET # BLD AUTO: 296 10*3/MM3 (ref 140–450)
POTASSIUM SERPL-SCNC: 4.5 MMOL/L (ref 3.5–5.2)
PROT SERPL-MCNC: 6.2 G/DL (ref 6–8.5)
RBC # BLD AUTO: 4.55 10*6/MM3 (ref 3.77–5.28)
SODIUM SERPL-SCNC: 137 MMOL/L (ref 136–145)
T4 FREE SERPL-MCNC: 1.23 NG/DL (ref 0.92–1.68)
TRIGL SERPL-MCNC: 90 MG/DL (ref 0–150)
TSH SERPL DL<=0.005 MIU/L-ACNC: 2.69 UIU/ML (ref 0.27–4.2)
VLDLC SERPL CALC-MCNC: 17 MG/DL (ref 5–40)
WBC # BLD AUTO: 9.71 10*3/MM3 (ref 3.4–10.8)

## 2024-11-27 RX ORDER — ACYCLOVIR 400 MG/1
1 TABLET ORAL
Qty: 9 EACH | Refills: 4 | Status: SHIPPED | OUTPATIENT
Start: 2024-11-27

## 2024-11-27 RX ORDER — ACYCLOVIR 400 MG/1
1 TABLET ORAL TAKE AS DIRECTED
Qty: 1 EACH | Refills: 0 | Status: SHIPPED | OUTPATIENT
Start: 2024-11-27

## 2024-11-27 NOTE — ASSESSMENT & PLAN NOTE
Orders:    Continuous Glucose Sensor (Dexcom G7 Sensor) misc; Use 1 each Every 10 (Ten) Days.    Continuous Glucose  (Dexcom G7 ) device; Use 1 each Take As Directed.

## 2024-11-29 RX ORDER — CEPHALEXIN 500 MG/1
500 CAPSULE ORAL 3 TIMES DAILY
Qty: 21 CAPSULE | Refills: 0 | Status: SHIPPED | OUTPATIENT
Start: 2024-11-29 | End: 2024-12-06

## 2024-12-01 DIAGNOSIS — M85.80 OSTEOPENIA, UNSPECIFIED LOCATION: ICD-10-CM

## 2024-12-01 DIAGNOSIS — Z91.89 HIGH RISK FOR HIP FRACTURE: Primary | ICD-10-CM

## 2024-12-02 ENCOUNTER — DOCUMENTATION (OUTPATIENT)
Dept: ONCOLOGY | Facility: HOSPITAL | Age: 79
End: 2024-12-02
Payer: MEDICARE

## 2024-12-02 ENCOUNTER — PATIENT MESSAGE (OUTPATIENT)
Dept: INTERNAL MEDICINE | Facility: CLINIC | Age: 79
End: 2024-12-02
Payer: MEDICARE

## 2024-12-03 ENCOUNTER — INFUSION (OUTPATIENT)
Dept: ONCOLOGY | Facility: HOSPITAL | Age: 79
End: 2024-12-03
Payer: MEDICARE

## 2024-12-03 DIAGNOSIS — M85.80 OSTEOPENIA, UNSPECIFIED LOCATION: ICD-10-CM

## 2024-12-03 DIAGNOSIS — Z91.89 HIGH RISK FOR HIP FRACTURE: Primary | ICD-10-CM

## 2024-12-03 PROCEDURE — 96372 THER/PROPH/DIAG INJ SC/IM: CPT

## 2024-12-03 PROCEDURE — 25010000002 DENOSUMAB 60 MG/ML SOLUTION PREFILLED SYRINGE: Performed by: INTERNAL MEDICINE

## 2024-12-03 RX ADMIN — DENOSUMAB 60 MG: 60 INJECTION SUBCUTANEOUS at 14:59

## 2025-01-16 NOTE — TELEPHONE ENCOUNTER
No checkmarks available   Rx Refill Note  Requested Prescriptions     Pending Prescriptions Disp Refills    Insulin Pen Needle (BD Pen Needle Micro U/F) 32G X 6 MM misc 100 each 1     Sig: Use 1 Pen needle as needed as directed      Last office visit with prescribing clinician: 11/25/2024   Last telemedicine visit with prescribing clinician: Visit date not found   Next office visit with prescribing clinician: 2/17/2025                         Would you like a call back once the refill request has been completed: [] Yes [] No    If the office needs to give you a call back, can they leave a voicemail: [] Yes [] No    Sheree Cortes MA  01/16/25, 10:33 EST

## 2025-01-20 RX ORDER — PEN NEEDLE, DIABETIC 32 GX 1/4"
1 NEEDLE, DISPOSABLE MISCELLANEOUS TAKE AS DIRECTED
Qty: 100 EACH | Refills: 1 | Status: SHIPPED | OUTPATIENT
Start: 2025-01-20

## 2025-02-17 ENCOUNTER — OFFICE VISIT (OUTPATIENT)
Dept: INTERNAL MEDICINE | Facility: CLINIC | Age: 80
End: 2025-02-17
Payer: MEDICARE

## 2025-02-17 VITALS
TEMPERATURE: 98.2 F | WEIGHT: 166 LBS | SYSTOLIC BLOOD PRESSURE: 106 MMHG | DIASTOLIC BLOOD PRESSURE: 70 MMHG | BODY MASS INDEX: 32.42 KG/M2 | HEART RATE: 59 BPM | OXYGEN SATURATION: 98 %

## 2025-02-17 DIAGNOSIS — I25.10 CHRONIC CORONARY ARTERY DISEASE: ICD-10-CM

## 2025-02-17 DIAGNOSIS — E78.2 MIXED HYPERLIPIDEMIA: ICD-10-CM

## 2025-02-17 DIAGNOSIS — E11.44 TYPE 2 DIABETES MELLITUS WITH DIABETIC AMYOTROPHY, WITHOUT LONG-TERM CURRENT USE OF INSULIN: Primary | ICD-10-CM

## 2025-02-17 DIAGNOSIS — G47.10 HYPERSOMNIA, UNSPECIFIED: ICD-10-CM

## 2025-02-17 DIAGNOSIS — I10 ESSENTIAL HYPERTENSION: ICD-10-CM

## 2025-02-17 DIAGNOSIS — R53.82 CHRONIC FATIGUE: ICD-10-CM

## 2025-02-17 PROCEDURE — 1126F AMNT PAIN NOTED NONE PRSNT: CPT | Performed by: INTERNAL MEDICINE

## 2025-02-17 PROCEDURE — 99214 OFFICE O/P EST MOD 30 MIN: CPT | Performed by: INTERNAL MEDICINE

## 2025-02-17 PROCEDURE — G2211 COMPLEX E/M VISIT ADD ON: HCPCS | Performed by: INTERNAL MEDICINE

## 2025-02-17 NOTE — PROGRESS NOTES
Lydia John is a 79 y.o. female, who presents with a chief complaint of   Chief Complaint   Patient presents with    Diabetes     3 month f/u            HPI   Pt here with her daughter who helps provide history.      T2DM - glucose up some.  She has been on jardiance 10mg and is now ready to move up to the 25mg dose.      Pt has had more fatigue.  Chf has been stable.  Weight stable.  Pt not sure if she snores.  She is on metoprolol which can cause fatigue as well.        The following portions of the patient's history were reviewed and updated as appropriate: allergies, current medications, past family history, past medical history, past social history, past surgical history and problem list.    Allergies: Aricept [donepezil] and Hydrocodone-acetaminophen    Review of Systems   Constitutional: Negative.    HENT: Negative.     Eyes: Negative.    Respiratory: Negative.     Cardiovascular: Negative.    Gastrointestinal: Negative.    Endocrine: Negative.    Genitourinary: Negative.    Musculoskeletal: Negative.    Skin: Negative.    Allergic/Immunologic: Negative.    Neurological: Negative.    Hematological: Negative.    Psychiatric/Behavioral: Negative.     All other systems reviewed and are negative.            Wt Readings from Last 3 Encounters:   02/17/25 75.3 kg (166 lb)   11/25/24 73.5 kg (162 lb)   08/21/24 75.2 kg (165 lb 12.8 oz)     Temp Readings from Last 3 Encounters:   02/17/25 98.2 °F (36.8 °C) (Infrared)   11/25/24 97.8 °F (36.6 °C) (Infrared)   08/21/24 97.3 °F (36.3 °C) (Infrared)     BP Readings from Last 3 Encounters:   02/17/25 106/70   11/25/24 110/60   08/21/24 104/68     Pulse Readings from Last 3 Encounters:   02/17/25 59   11/25/24 56   08/21/24 51     Body mass index is 32.42 kg/m².  SpO2 Readings from Last 3 Encounters:   02/17/25 98%   11/25/24 97%   08/21/24 98%          Physical Exam  Vitals and nursing note reviewed.   Constitutional:       General: She is not in acute  distress.     Appearance: She is well-developed.   HENT:      Head: Normocephalic and atraumatic.      Right Ear: External ear normal.      Left Ear: External ear normal.      Nose: Nose normal.   Eyes:      Conjunctiva/sclera: Conjunctivae normal.      Pupils: Pupils are equal, round, and reactive to light.   Cardiovascular:      Rate and Rhythm: Normal rate and regular rhythm.      Heart sounds: Normal heart sounds.   Pulmonary:      Effort: Pulmonary effort is normal. No respiratory distress.      Breath sounds: Normal breath sounds. No wheezing.   Musculoskeletal:      Cervical back: Normal range of motion and neck supple.      Comments: In wheelchair.   Skin:     General: Skin is warm and dry.   Neurological:      Mental Status: She is alert and oriented to person, place, and time.   Psychiatric:         Behavior: Behavior normal.         Thought Content: Thought content normal.         Judgment: Judgment normal.         Results for orders placed or performed in visit on 11/25/24   Comprehensive Metabolic Panel    Collection Time: 11/25/24  4:21 PM    Specimen: Blood   Result Value Ref Range    Glucose 148 (H) 65 - 99 mg/dL    BUN 24 (H) 8 - 23 mg/dL    Creatinine 0.92 0.57 - 1.00 mg/dL    EGFR Result 63.9 >60.0 mL/min/1.73    BUN/Creatinine Ratio 26.1 (H) 7.0 - 25.0    Sodium 137 136 - 145 mmol/L    Potassium 4.5 3.5 - 5.2 mmol/L    Chloride 101 98 - 107 mmol/L    Total CO2 23.7 22.0 - 29.0 mmol/L    Calcium 9.5 8.6 - 10.5 mg/dL    Total Protein 6.2 6.0 - 8.5 g/dL    Albumin 3.8 3.5 - 5.2 g/dL    Globulin 2.4 gm/dL    A/G Ratio 1.6 g/dL    Total Bilirubin 0.4 0.0 - 1.2 mg/dL    Alkaline Phosphatase 122 (H) 39 - 117 U/L    AST (SGOT) 20 1 - 32 U/L    ALT (SGPT) 22 1 - 33 U/L   Hemoglobin A1c    Collection Time: 11/25/24  4:21 PM    Specimen: Blood   Result Value Ref Range    Hemoglobin A1C 8.70 (H) 4.80 - 5.60 %   Lipid Panel With LDL / HDL Ratio    Collection Time: 11/25/24  4:21 PM    Specimen: Blood   Result  Value Ref Range    Total Cholesterol 124 0 - 200 mg/dL    Triglycerides 90 0 - 150 mg/dL    HDL Cholesterol 50 40 - 60 mg/dL    VLDL Cholesterol Tyrell 17 5 - 40 mg/dL    LDL Chol Calc (NIH) 57 0 - 100 mg/dL    LDL/HDL RATIO 1.12    T4, Free    Collection Time: 11/25/24  4:21 PM    Specimen: Blood   Result Value Ref Range    Free T4 1.23 0.92 - 1.68 ng/dL   TSH    Collection Time: 11/25/24  4:21 PM    Specimen: Blood   Result Value Ref Range    TSH 2.690 0.270 - 4.200 uIU/mL   CBC & Differential    Collection Time: 11/25/24  4:21 PM    Specimen: Blood   Result Value Ref Range    WBC 9.71 3.40 - 10.80 10*3/mm3    RBC 4.55 3.77 - 5.28 10*6/mm3    Hemoglobin 11.1 (L) 12.0 - 15.9 g/dL    Hematocrit 35.9 34.0 - 46.6 %    MCV 78.9 (L) 79.0 - 97.0 fL    MCH 24.4 (L) 26.6 - 33.0 pg    MCHC 30.9 (L) 31.5 - 35.7 g/dL    RDW 16.8 (H) 12.3 - 15.4 %    Platelets 296 140 - 450 10*3/mm3    Neutrophil Rel % 62.1 42.7 - 76.0 %    Lymphocyte Rel % 26.6 19.6 - 45.3 %    Monocyte Rel % 6.2 5.0 - 12.0 %    Eosinophil Rel % 4.4 0.3 - 6.2 %    Basophil Rel % 0.3 0.0 - 1.5 %    Neutrophils Absolute 6.03 1.70 - 7.00 10*3/mm3    Lymphocytes Absolute 2.58 0.70 - 3.10 10*3/mm3    Monocytes Absolute 0.60 0.10 - 0.90 10*3/mm3    Eosinophils Absolute 0.43 (H) 0.00 - 0.40 10*3/mm3    Basophils Absolute 0.03 0.00 - 0.20 10*3/mm3    Immature Granulocyte Rel % 0.4 0.0 - 0.5 %    Immature Grans Absolute 0.04 0.00 - 0.05 10*3/mm3    nRBC 0.0 0.0 - 0.2 /100 WBC     Result Review :                  Assessment and Plan    Diagnoses and all orders for this visit:    1. 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 Mixed  hyperlipidemia  -     Comprehensive Metabolic Panel  -     Lipid Panel With LDL / HDL Ratio    3. Essential hypertension  -     CBC & Differential  -     Comprehensive Metabolic Panel  -     Hemoglobin A1c  -     Lipid Panel With LDL / HDL Ratio  -     T4, Free  -     TSH  -     Vitamin B12  -     Microalbumin / Creatinine Urine Ratio - Urine, Clean Catch  -     Home Sleep Study; Future    4. Chronic coronary artery disease - rec pt start cardiac rehab to increase stamina    5. Chronic fatigue  -     CBC & Differential  -     Comprehensive Metabolic Panel  -     Hemoglobin A1c  -     Lipid Panel With LDL / HDL Ratio  -     T4, Free  -     TSH  -     Vitamin B12  -     Microalbumin / Creatinine Urine Ratio - Urine, Clean Catch  -     Home Sleep Study; Future    6. Hypersomnia, unspecified  -     Home Sleep Study; Future                       Outpatient Medications Prior to Visit   Medication Sig Dispense Refill    acetaminophen (TYLENOL) 500 MG tablet Take 1 tablet by mouth Every 6 (Six) Hours As Needed for Mild Pain. PRN      Aspirin Low Dose 81 MG EC tablet Take 1 tablet by mouth Daily. 90 tablet 3    atorvastatin (LIPITOR) 80 MG tablet Take 1 tablet by mouth Daily. 90 tablet 3    calcium citrate-vitamin d (CITRACAL) 200-250 MG-UNIT tablet tablet Take 1 tablet by mouth Daily.      Continuous Glucose  (Dexcom G7 ) device Use 1 each Take As Directed. 1 each 0    Continuous Glucose Sensor (Dexcom G7 Sensor) misc Use 1 each Every 10 (Ten) Days. 9 each 4    DULoxetine (CYMBALTA) 60 MG capsule Take 1 capsule by mouth once daily 90 capsule 1    empagliflozin (Jardiance) 25 MG tablet tablet Take 1 tablet by mouth Daily. 28 tablet 0    glucose blood (Accu-Chek Guide) test strip 1 each by Other route 2 (Two) Times a Day. Use as instructed 200 each 4    Insulin Pen Needle (BD Pen Needle Micro U/F) 32G X 6 MM misc Inject 1 each under the skin into the appropriate area as directed Take As Directed. Use 1 Pen  needle as needed as directed 100 each 1    metoprolol succinate XL (TOPROL-XL) 25 MG 24 hr tablet Take 0.5 tablets by mouth Daily. 45 tablet 3    NovoLOG FlexPen 100 UNIT/ML solution pen-injector sc pen Inject 15 Units under the skin into the appropriate area as directed 4 (Four) Times a Day. (Patient taking differently: Inject 10 Units under the skin into the appropriate area as directed 4 (Four) Times a Day.) 60 mL 3    pantoprazole (PROTONIX) 40 MG EC tablet Take 1 tablet by mouth 2 (Two) Times a Day. 180 tablet 1    spironolactone (ALDACTONE) 25 MG tablet Take 0.5 tablets by mouth Every Other Day. 45 tablet 3    Tresiba FlexTouch 100 UNIT/ML solution pen-injector injection Inject 35 Units under the skin into the appropriate area as directed Every Night. (Patient taking differently: Inject 18 Units under the skin into the appropriate area as directed Every Night.) 35 mL 3    vitamin D (ERGOCALCIFEROL) 1.25 MG (77779 UT) capsule capsule Take 1 capsule by mouth once a week 13 capsule 0    Jardiance 10 MG tablet tablet Take 1 tablet by mouth Daily.      Brilinta 90 MG tablet tablet Take 1 tablet by mouth 2 (Two) Times a Day. (Patient not taking: Reported on 2/17/2025) 180 tablet 3    bumetanide (BUMEX) 1 MG tablet Take 1 tablet by mouth Daily. (Patient not taking: Reported on 2/17/2025) 90 tablet 3     No facility-administered medications prior to visit.     No orders of the defined types were placed in this encounter.    [unfilled]  Medications Discontinued During This Encounter   Medication Reason    Jardiance 10 MG tablet tablet *Therapy completed         No follow-ups on file.    Patient was given instructions and counseling regarding her condition or for health maintenance advice. Please see specific information pulled into the AVS if appropriate.

## 2025-02-18 DIAGNOSIS — D64.9 CHRONIC ANEMIA: Primary | ICD-10-CM

## 2025-02-18 LAB
ALBUMIN SERPL-MCNC: 3.8 G/DL (ref 3.5–5.2)
ALBUMIN/GLOB SERPL: 1.7 G/DL
ALP SERPL-CCNC: 88 U/L (ref 39–117)
ALT SERPL-CCNC: 21 U/L (ref 1–33)
AST SERPL-CCNC: 18 U/L (ref 1–32)
BASOPHILS # BLD AUTO: 0.02 10*3/MM3 (ref 0–0.2)
BASOPHILS NFR BLD AUTO: 0.2 % (ref 0–1.5)
BILIRUB SERPL-MCNC: 0.4 MG/DL (ref 0–1.2)
BUN SERPL-MCNC: 23 MG/DL (ref 8–23)
BUN/CREAT SERPL: 24.5 (ref 7–25)
CALCIUM SERPL-MCNC: 9.4 MG/DL (ref 8.6–10.5)
CHLORIDE SERPL-SCNC: 105 MMOL/L (ref 98–107)
CHOLEST SERPL-MCNC: 113 MG/DL (ref 0–200)
CO2 SERPL-SCNC: 22.2 MMOL/L (ref 22–29)
CREAT SERPL-MCNC: 0.94 MG/DL (ref 0.57–1)
EGFRCR SERPLBLD CKD-EPI 2021: 62.2 ML/MIN/1.73
EOSINOPHIL # BLD AUTO: 0.32 10*3/MM3 (ref 0–0.4)
EOSINOPHIL NFR BLD AUTO: 3.6 % (ref 0.3–6.2)
ERYTHROCYTE [DISTWIDTH] IN BLOOD BY AUTOMATED COUNT: 16.7 % (ref 12.3–15.4)
GLOBULIN SER CALC-MCNC: 2.3 GM/DL
GLUCOSE SERPL-MCNC: 248 MG/DL (ref 65–99)
HBA1C MFR BLD: 8.7 % (ref 4.8–5.6)
HCT VFR BLD AUTO: 36.3 % (ref 34–46.6)
HDLC SERPL-MCNC: 44 MG/DL (ref 40–60)
HGB BLD-MCNC: 10.3 G/DL (ref 12–15.9)
IMM GRANULOCYTES # BLD AUTO: 0.04 10*3/MM3 (ref 0–0.05)
IMM GRANULOCYTES NFR BLD AUTO: 0.4 % (ref 0–0.5)
LDLC SERPL CALC-MCNC: 44 MG/DL (ref 0–100)
LDLC/HDLC SERPL: 0.91 {RATIO}
LYMPHOCYTES # BLD AUTO: 1.83 10*3/MM3 (ref 0.7–3.1)
LYMPHOCYTES NFR BLD AUTO: 20.4 % (ref 19.6–45.3)
Lab: NORMAL
Lab: NORMAL
MCH RBC QN AUTO: 21.5 PG (ref 26.6–33)
MCHC RBC AUTO-ENTMCNC: 28.4 G/DL (ref 31.5–35.7)
MCV RBC AUTO: 75.8 FL (ref 79–97)
MONOCYTES # BLD AUTO: 0.59 10*3/MM3 (ref 0.1–0.9)
MONOCYTES NFR BLD AUTO: 6.6 % (ref 5–12)
NEUTROPHILS # BLD AUTO: 6.15 10*3/MM3 (ref 1.7–7)
NEUTROPHILS NFR BLD AUTO: 68.8 % (ref 42.7–76)
NRBC BLD AUTO-RTO: 0 /100 WBC (ref 0–0.2)
PLATELET # BLD AUTO: 281 10*3/MM3 (ref 140–450)
POTASSIUM SERPL-SCNC: 4.9 MMOL/L (ref 3.5–5.2)
PROT SERPL-MCNC: 6.1 G/DL (ref 6–8.5)
RBC # BLD AUTO: 4.79 10*6/MM3 (ref 3.77–5.28)
SODIUM SERPL-SCNC: 137 MMOL/L (ref 136–145)
T4 FREE SERPL-MCNC: 1.14 NG/DL (ref 0.92–1.68)
TRIGL SERPL-MCNC: 144 MG/DL (ref 0–150)
TSH SERPL DL<=0.005 MIU/L-ACNC: 3.09 UIU/ML (ref 0.27–4.2)
UNABLE TO VOID: NORMAL
VIT B12 SERPL-MCNC: 561 PG/ML (ref 211–946)
VLDLC SERPL CALC-MCNC: 25 MG/DL (ref 5–40)
WBC # BLD AUTO: 8.95 10*3/MM3 (ref 3.4–10.8)

## 2025-02-25 LAB
Lab: NORMAL
Lab: NORMAL
VERBAL ADD-ON: NORMAL

## 2025-03-03 DIAGNOSIS — E11.8 TYPE 2 DIABETES MELLITUS WITH COMPLICATION, WITHOUT LONG-TERM CURRENT USE OF INSULIN: ICD-10-CM

## 2025-03-04 LAB
Lab: NORMAL
Lab: NORMAL
VERBAL ADD-ON: NORMAL

## 2025-03-04 RX ORDER — INSULIN DEGLUDEC 100 U/ML
INJECTION, SOLUTION SUBCUTANEOUS
Qty: 15 ML | Refills: 3 | Status: SHIPPED | OUTPATIENT
Start: 2025-03-04

## 2025-03-04 RX ORDER — INSULIN ASPART 100 [IU]/ML
INJECTION, SOLUTION INTRAVENOUS; SUBCUTANEOUS
Qty: 15 ML | Refills: 3 | Status: SHIPPED | OUTPATIENT
Start: 2025-03-04

## 2025-03-04 NOTE — TELEPHONE ENCOUNTER
Rx Refill Note  Requested Prescriptions     Pending Prescriptions Disp Refills    NovoLOG FlexPen 100 UNIT/ML solution pen-injector sc pen [Pharmacy Med Name: NovoLOG FlexPen 100 UNIT/ML Subcutaneous Solution Pen-injector] 15 mL 3     Sig: INJECT 15 UNITS INT THE SKIN FOUR TIMES DAILY    Tresiba FlexTouch 100 UNIT/ML solution pen-injector injection [Pharmacy Med Name: Tresiba FlexTouch 100 UNIT/ML Subcutaneous Solution Pen-injector] 15 mL 3     Sig: INJECT 35 UNITS SUBCUTANEOUSLY NIGHTLY      Last office visit with prescribing clinician: 2/17/2025   Last telemedicine visit with prescribing clinician: Visit date not found   Next office visit with prescribing clinician: 5/19/2025                         Would you like a call back once the refill request has been completed: [] Yes [] No    If the office needs to give you a call back, can they leave a voicemail: [] Yes [] No    Sheree Cortes MA  03/04/25, 08:19 EST

## 2025-03-10 NOTE — NURSING NOTE
Called patient Left message to call back.    Pt arrived for prolia injection. Indication and side effects reviewed. Denies recent dental work. Reviewed labs and verified medications. Pt tolerated injection well without complication. Encouraged pt to call ordering provider for any questions or concerns, and instructed on how to schedule future appts. Pt v/u and was discharged in stable condition.

## 2025-03-12 LAB
FERRITIN SERPL-MCNC: 12.8 NG/ML (ref 13–150)
IRON SATN MFR SERPL: 5 % (ref 20–50)
IRON SERPL-MCNC: 26 MCG/DL (ref 37–145)
TIBC SERPL-MCNC: 483 MCG/DL
UIBC SERPL-MCNC: 457 MCG/DL (ref 112–346)
WRITTEN AUTHORIZATION: NORMAL

## 2025-03-17 DIAGNOSIS — K21.9 GASTROESOPHAGEAL REFLUX DISEASE, UNSPECIFIED WHETHER ESOPHAGITIS PRESENT: ICD-10-CM

## 2025-03-18 RX ORDER — PANTOPRAZOLE SODIUM 40 MG/1
40 TABLET, DELAYED RELEASE ORAL 2 TIMES DAILY
Qty: 180 TABLET | Refills: 1 | Status: SHIPPED | OUTPATIENT
Start: 2025-03-18

## 2025-03-18 NOTE — TELEPHONE ENCOUNTER
Rx Refill Note  Requested Prescriptions     Pending Prescriptions Disp Refills    pantoprazole (PROTONIX) 40 MG EC tablet [Pharmacy Med Name: Pantoprazole Sodium 40 MG Oral Tablet Delayed Release] 180 tablet 1     Sig: Take 1 tablet by mouth twice daily      Last office visit with prescribing clinician: 2/17/2025   Last telemedicine visit with prescribing clinician: Visit date not found   Next office visit with prescribing clinician: 5/19/2025                         Would you like a call back once the refill request has been completed: [] Yes [] No    If the office needs to give you a call back, can they leave a voicemail: [] Yes [] No    Arian Medina MA  03/18/25, 12:21 EDT

## 2025-03-19 DIAGNOSIS — I25.10 CHRONIC CORONARY ARTERY DISEASE: ICD-10-CM

## 2025-03-19 DIAGNOSIS — I10 ESSENTIAL HYPERTENSION: ICD-10-CM

## 2025-03-19 DIAGNOSIS — E11.44 TYPE 2 DIABETES MELLITUS WITH DIABETIC AMYOTROPHY, WITHOUT LONG-TERM CURRENT USE OF INSULIN: ICD-10-CM

## 2025-03-19 NOTE — TELEPHONE ENCOUNTER
Rx Refill Note  Requested Prescriptions     Pending Prescriptions Disp Refills    empagliflozin (Jardiance) 25 MG tablet tablet 90 tablet 1     Sig: Take 1 tablet by mouth Daily.      Last office visit with prescribing clinician: 2/17/2025   Last telemedicine visit with prescribing clinician: Visit date not found   Next office visit with prescribing clinician: 5/19/2025                         Would you like a call back once the refill request has been completed: [] Yes [] No    If the office needs to give you a call back, can they leave a voicemail: [] Yes [] No    Sheree Cortes MA  03/19/25, 13:32 EDT

## 2025-03-19 NOTE — TELEPHONE ENCOUNTER
Switched class from Sample to normal   Rx Refill Note  Requested Prescriptions     Pending Prescriptions Disp Refills    empagliflozin (Jardiance) 25 MG tablet tablet 90 tablet 1     Sig: Take 1 tablet by mouth Daily.      Last office visit with prescribing clinician: 2/17/2025   Last telemedicine visit with prescribing clinician: Visit date not found   Next office visit with prescribing clinician: 5/19/2025                         Would you like a call back once the refill request has been completed: [] Yes [] No    If the office needs to give you a call back, can they leave a voicemail: [] Yes [] No    Sheree Cortes MA  03/19/25, 13:33 EDT

## 2025-03-27 DIAGNOSIS — I21.9 MYOCARDIAL INFARCTION IN RECOVERY PHASE: ICD-10-CM

## 2025-03-27 DIAGNOSIS — Z86.73 HISTORY OF CEREBROVASCULAR ACCIDENT (CVA) FROM RIGHT CAROTID ARTERY OCCLUSION INVOLVING RIGHT MIDDLE CEREBRAL ARTERY TERRITORY: ICD-10-CM

## 2025-03-27 DIAGNOSIS — I25.10 CHRONIC CORONARY ARTERY DISEASE: ICD-10-CM

## 2025-03-28 RX ORDER — ATORVASTATIN CALCIUM 80 MG/1
80 TABLET, FILM COATED ORAL DAILY
Qty: 90 TABLET | Refills: 3 | Status: SHIPPED | OUTPATIENT
Start: 2025-03-28

## 2025-03-28 RX ORDER — HYDROGEN PEROXIDE 2.65 ML/100ML
81 LIQUID ORAL; TOPICAL DAILY
Qty: 90 TABLET | Refills: 0 | Status: SHIPPED | OUTPATIENT
Start: 2025-03-28

## 2025-03-28 NOTE — TELEPHONE ENCOUNTER
No checkmarks available   Rx Refill Note  Requested Prescriptions     Pending Prescriptions Disp Refills    EQ Aspirin Adult Low Dose 81 MG EC tablet [Pharmacy Med Name: EQ Aspirin Adult Low Dose 81 MG Oral Tablet Delayed Release] 90 tablet 3     Sig: Take 1 tablet by mouth once daily     Signed Prescriptions Disp Refills    atorvastatin (LIPITOR) 80 MG tablet 90 tablet 3     Sig: Take 1 tablet by mouth once daily     Authorizing Provider: JESSI MADDEN     Ordering User: SUNIL ALBRIGHT      Last office visit with prescribing clinician: 2/17/2025   Last telemedicine visit with prescribing clinician: Visit date not found   Next office visit with prescribing clinician: 5/19/2025                         Would you like a call back once the refill request has been completed: [] Yes [] No    If the office needs to give you a call back, can they leave a voicemail: [] Yes [] No    Sunil Albright MA  03/28/25, 08:06 EDT

## 2025-03-31 RX ORDER — PEN NEEDLE, DIABETIC 32 GX 1/4"
NEEDLE, DISPOSABLE MISCELLANEOUS
Qty: 100 EACH | Refills: 0 | Status: SHIPPED | OUTPATIENT
Start: 2025-03-31

## 2025-03-31 NOTE — TELEPHONE ENCOUNTER
Rx Refill Note  Requested Prescriptions     Pending Prescriptions Disp Refills    BD Pen Needle Micro U/F 32G X 6 MM misc [Pharmacy Med Name: BD UF MCRO PEN NDL [id number] MIS] 100 each 0     Sig: USE 1 PEN NEEDLE AS DIRECTED      Last office visit with prescribing clinician: 2/17/2025   Last telemedicine visit with prescribing clinician: Visit date not found   Next office visit with prescribing clinician: 5/19/2025                         Would you like a call back once the refill request has been completed: [] Yes [] No    If the office needs to give you a call back, can they leave a voicemail: [] Yes [] No    Arian Medina MA  03/31/25, 09:52 EDT

## 2025-05-05 DIAGNOSIS — F43.21 SITUATIONAL DEPRESSION: ICD-10-CM

## 2025-05-05 DIAGNOSIS — E11.44 DIABETIC AMYOTROPHY ASSOCIATED WITH TYPE 2 DIABETES MELLITUS: ICD-10-CM

## 2025-05-05 RX ORDER — DULOXETIN HYDROCHLORIDE 60 MG/1
60 CAPSULE, DELAYED RELEASE ORAL DAILY
Qty: 90 CAPSULE | Refills: 0 | Status: SHIPPED | OUTPATIENT
Start: 2025-05-05

## 2025-05-05 NOTE — TELEPHONE ENCOUNTER
Rx Refill Note  Requested Prescriptions     Pending Prescriptions Disp Refills    DULoxetine (CYMBALTA) 60 MG capsule [Pharmacy Med Name: DULoxetine HCl 60 MG Oral Capsule Delayed Release Particles] 90 capsule 0     Sig: Take 1 capsule by mouth once daily      Last office visit with prescribing clinician: 2/17/2025   Last telemedicine visit with prescribing clinician: Visit date not found   Next office visit with prescribing clinician: 5/19/2025                         Would you like a call back once the refill request has been completed: [] Yes [] No    If the office needs to give you a call back, can they leave a voicemail: [] Yes [] No    Sheree Cortes MA  05/05/25, 13:20 EDT

## 2025-05-19 ENCOUNTER — OFFICE VISIT (OUTPATIENT)
Dept: INTERNAL MEDICINE | Facility: CLINIC | Age: 80
End: 2025-05-19
Payer: MEDICARE

## 2025-05-19 VITALS
DIASTOLIC BLOOD PRESSURE: 76 MMHG | BODY MASS INDEX: 30.58 KG/M2 | WEIGHT: 156.6 LBS | OXYGEN SATURATION: 95 % | SYSTOLIC BLOOD PRESSURE: 120 MMHG | TEMPERATURE: 97.1 F | HEART RATE: 56 BPM

## 2025-05-19 DIAGNOSIS — I25.10 CHRONIC CORONARY ARTERY DISEASE: ICD-10-CM

## 2025-05-19 DIAGNOSIS — E78.2 MIXED HYPERLIPIDEMIA: ICD-10-CM

## 2025-05-19 DIAGNOSIS — E55.9 VITAMIN D DEFICIENCY: ICD-10-CM

## 2025-05-19 DIAGNOSIS — I10 ESSENTIAL HYPERTENSION: ICD-10-CM

## 2025-05-19 DIAGNOSIS — E11.44 TYPE 2 DIABETES MELLITUS WITH DIABETIC AMYOTROPHY, WITHOUT LONG-TERM CURRENT USE OF INSULIN: Primary | ICD-10-CM

## 2025-05-19 NOTE — PROGRESS NOTES
Lydia John is a 79 y.o. female, who presents with a chief complaint of   Chief Complaint   Patient presents with    Diabetes     3 month f/u            Diabetes     Pt here with her daughter who helps provide history.       T2DM - She moved up to the jardiance 25mg dose.  She is on tresiba 18 units night and novolog 10 units in the evening if glucoses > 200.       CHF  Chf has been stable.  Weight stable.  Pt not sure if she snores.  She is on metoprolol which can cause fatigue as well.  She has not been taking her bumex.       Iron deficiency anemia - hb 10.  Scheduled to start iv iron infusion soon.       The following portions of the patient's history were reviewed and updated as appropriate: allergies, current medications, past family history, past medical history, past social history, past surgical history and problem list.    Allergies: Aricept [donepezil] and Hydrocodone-acetaminophen    Review of Systems   Constitutional: Negative.    HENT: Negative.     Eyes: Negative.    Respiratory: Negative.     Cardiovascular: Negative.    Gastrointestinal: Negative.    Endocrine: Negative.    Genitourinary: Negative.    Musculoskeletal: Negative.    Skin: Negative.    Allergic/Immunologic: Negative.    Neurological: Negative.    Hematological: Negative.    Psychiatric/Behavioral: Negative.     All other systems reviewed and are negative.            Wt Readings from Last 3 Encounters:   05/19/25 71 kg (156 lb 9.6 oz)   02/17/25 75.3 kg (166 lb)   11/25/24 73.5 kg (162 lb)     Temp Readings from Last 3 Encounters:   05/19/25 97.1 °F (36.2 °C) (Infrared)   02/17/25 98.2 °F (36.8 °C) (Infrared)   11/25/24 97.8 °F (36.6 °C) (Infrared)     BP Readings from Last 3 Encounters:   05/19/25 120/76   02/17/25 106/70   11/25/24 110/60     Pulse Readings from Last 3 Encounters:   05/19/25 56   02/17/25 59   11/25/24 56     Body mass index is 30.58 kg/m².  SpO2 Readings from Last 3 Encounters:   05/19/25 95%   02/17/25 98%    11/25/24 97%          Physical Exam  Vitals and nursing note reviewed.   Constitutional:       General: She is not in acute distress.     Appearance: She is well-developed.   HENT:      Head: Normocephalic and atraumatic.      Right Ear: External ear normal.      Left Ear: External ear normal.      Nose: Nose normal.   Eyes:      Conjunctiva/sclera: Conjunctivae normal.      Pupils: Pupils are equal, round, and reactive to light.   Cardiovascular:      Rate and Rhythm: Normal rate and regular rhythm.      Heart sounds: Normal heart sounds.   Pulmonary:      Effort: Pulmonary effort is normal. No respiratory distress.      Breath sounds: Normal breath sounds. No wheezing.   Musculoskeletal:         General: Normal range of motion.      Cervical back: Normal range of motion and neck supple.      Comments: Normal gait   Skin:     General: Skin is warm and dry.   Neurological:      General: No focal deficit present.      Mental Status: She is alert and oriented to person, place, and time.   Psychiatric:         Mood and Affect: Mood normal.         Behavior: Behavior normal.         Thought Content: Thought content normal.         Judgment: Judgment normal.       Results for orders placed or performed in visit on 02/17/25   Comprehensive Metabolic Panel    Collection Time: 02/17/25  4:43 PM    Specimen: Blood   Result Value Ref Range    Glucose 248 (H) 65 - 99 mg/dL    BUN 23 8 - 23 mg/dL    Creatinine 0.94 0.57 - 1.00 mg/dL    EGFR Result 62.2 >60.0 mL/min/1.73    BUN/Creatinine Ratio 24.5 7.0 - 25.0    Sodium 137 136 - 145 mmol/L    Potassium 4.9 3.5 - 5.2 mmol/L    Chloride 105 98 - 107 mmol/L    Total CO2 22.2 22.0 - 29.0 mmol/L    Calcium 9.4 8.6 - 10.5 mg/dL    Total Protein 6.1 6.0 - 8.5 g/dL    Albumin 3.8 3.5 - 5.2 g/dL    Globulin 2.3 gm/dL    A/G Ratio 1.7 g/dL    Total Bilirubin 0.4 0.0 - 1.2 mg/dL    Alkaline Phosphatase 88 39 - 117 U/L    AST (SGOT) 18 1 - 32 U/L    ALT (SGPT) 21 1 - 33 U/L   Hemoglobin  A1c    Collection Time: 02/17/25  4:43 PM    Specimen: Blood   Result Value Ref Range    Hemoglobin A1C 8.70 (H) 4.80 - 5.60 %   Lipid Panel With LDL / HDL Ratio    Collection Time: 02/17/25  4:43 PM    Specimen: Blood   Result Value Ref Range    Total Cholesterol 113 0 - 200 mg/dL    Triglycerides 144 0 - 150 mg/dL    HDL Cholesterol 44 40 - 60 mg/dL    VLDL Cholesterol Tyrell 25 5 - 40 mg/dL    LDL Chol Calc (NIH) 44 0 - 100 mg/dL    LDL/HDL RATIO 0.91    T4, Free    Collection Time: 02/17/25  4:43 PM    Specimen: Blood   Result Value Ref Range    Free T4 1.14 0.92 - 1.68 ng/dL   TSH    Collection Time: 02/17/25  4:43 PM    Specimen: Blood   Result Value Ref Range    TSH 3.090 0.270 - 4.200 uIU/mL   Vitamin B12    Collection Time: 02/17/25  4:43 PM    Specimen: Blood   Result Value Ref Range    Vitamin B-12 561 211 - 946 pg/mL   Unable To Void    Collection Time: 02/17/25  4:43 PM   Result Value Ref Range    Unable to Void Comment    Verbal Order    Collection Time: 02/17/25  4:43 PM   Result Value Ref Range    See below: Comment:     Additional Test(s) Requested Comment:     Verbal Add-on Comment    Iron Profile    Collection Time: 02/17/25  4:43 PM   Result Value Ref Range    TIBC 483 mcg/dL    UIBC 457 (H) 112 - 346 mcg/dL    Iron 26 (L) 37 - 145 mcg/dL    Iron Saturation 5 (L) 20 - 50 %   Ferritin    Collection Time: 02/17/25  4:43 PM   Result Value Ref Range    Ferritin 12.80 (L) 13.00 - 150.00 ng/mL   Written Authorization    Collection Time: 02/17/25  4:43 PM   Result Value Ref Range    Written Authorization Comment    CBC & Differential    Collection Time: 02/17/25  4:43 PM    Specimen: Blood   Result Value Ref Range    WBC 8.95 3.40 - 10.80 10*3/mm3    RBC 4.79 3.77 - 5.28 10*6/mm3    Hemoglobin 10.3 (L) 12.0 - 15.9 g/dL    Hematocrit 36.3 34.0 - 46.6 %    MCV 75.8 (L) 79.0 - 97.0 fL    MCH 21.5 (L) 26.6 - 33.0 pg    MCHC 28.4 (L) 31.5 - 35.7 g/dL    RDW 16.7 (H) 12.3 - 15.4 %    Platelets 281 140 - 450  10*3/mm3    Neutrophil Rel % 68.8 42.7 - 76.0 %    Lymphocyte Rel % 20.4 19.6 - 45.3 %    Monocyte Rel % 6.6 5.0 - 12.0 %    Eosinophil Rel % 3.6 0.3 - 6.2 %    Basophil Rel % 0.2 0.0 - 1.5 %    Neutrophils Absolute 6.15 1.70 - 7.00 10*3/mm3    Lymphocytes Absolute 1.83 0.70 - 3.10 10*3/mm3    Monocytes Absolute 0.59 0.10 - 0.90 10*3/mm3    Eosinophils Absolute 0.32 0.00 - 0.40 10*3/mm3    Basophils Absolute 0.02 0.00 - 0.20 10*3/mm3    Immature Granulocyte Rel % 0.4 0.0 - 0.5 %    Immature Grans Absolute 0.04 0.00 - 0.05 10*3/mm3    nRBC 0.0 0.0 - 0.2 /100 WBC     Result Review :                  Assessment and Plan    Diagnoses and all orders for this visit:    1. 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   -     CBC & Differential  -     Comprehensive Metabolic Panel  -     Hemoglobin A1c  -     Lipid Panel With LDL / HDL Ratio  -     T4, Free  -     TSH    2. Mixed hyperlipidemia - cont statin  -     Comprehensive Metabolic Panel  -     Lipid Panel With LDL / HDL Ratio    3. Chronic coronary artery disease - pt anemic getting ready to start iv iron infusions per cardiology  -     CBC & Differential  -     Comprehensive Metabolic Panel  -     Hemoglobin A1c  -     Lipid Panel With LDL / HDL Ratio  -     T4, Free  -     TSH    4. Essential hypertension - well controlled.   -     CBC & Differential  -     Comprehensive Metabolic Panel  -     Hemoglobin A1c  -     Lipid Panel With LDL / HDL Ratio  -     T4, Free  -     TSH                       Outpatient Medications Prior to Visit   Medication Sig Dispense Refill    acetaminophen (TYLENOL) 500 MG tablet Take 1 tablet by mouth Every 6 (Six) Hours As Needed for Mild Pain. PRN      atorvastatin (LIPITOR) 80 MG tablet Take 1 tablet by mouth once daily 90 tablet 3    BD Pen Needle Micro U/F 32G X 6 MM misc USE 1 PEN NEEDLE AS DIRECTED 100 each  0    bumetanide (BUMEX) 1 MG tablet Take 1 tablet by mouth Daily. 90 tablet 3    calcium citrate-vitamin d (CITRACAL) 200-250 MG-UNIT tablet tablet Take 1 tablet by mouth Daily.      Continuous Glucose  (Dexcom G7 ) device Use 1 each Take As Directed. 1 each 0    Continuous Glucose Sensor (Dexcom G7 Sensor) misc Use 1 each Every 10 (Ten) Days. 9 each 4    DULoxetine (CYMBALTA) 60 MG capsule Take 1 capsule by mouth once daily 90 capsule 0    empagliflozin (Jardiance) 25 MG tablet tablet Take 1 tablet by mouth Daily. 90 tablet 1    EQ Aspirin Adult Low Dose 81 MG EC tablet Take 1 tablet by mouth once daily 90 tablet 0    glucose blood (Accu-Chek Guide) test strip 1 each by Other route 2 (Two) Times a Day. Use as instructed 200 each 4    metoprolol succinate XL (TOPROL-XL) 25 MG 24 hr tablet Take 0.5 tablets by mouth Daily. 45 tablet 3    NovoLOG FlexPen 100 UNIT/ML solution pen-injector sc pen INJECT 15 UNITS INT THE SKIN FOUR TIMES DAILY 15 mL 3    pantoprazole (PROTONIX) 40 MG EC tablet Take 1 tablet by mouth twice daily 180 tablet 1    spironolactone (ALDACTONE) 25 MG tablet Take 0.5 tablets by mouth Every Other Day. 45 tablet 3    Tresiba FlexTouch 100 UNIT/ML solution pen-injector injection INJECT 35 UNITS SUBCUTANEOUSLY NIGHTLY 15 mL 3    vitamin D (ERGOCALCIFEROL) 1.25 MG (15260 UT) capsule capsule Take 1 capsule by mouth once a week 13 capsule 0    Brilinta 90 MG tablet tablet Take 1 tablet by mouth 2 (Two) Times a Day. (Patient not taking: Reported on 2/17/2025) 180 tablet 3     No facility-administered medications prior to visit.     No orders of the defined types were placed in this encounter.    [unfilled]  Medications Discontinued During This Encounter   Medication Reason    Brilinta 90 MG tablet tablet *Therapy completed         Return in about 3 months (around 8/19/2025) for Recheck, labs.    Patient was given instructions and counseling regarding her condition or for health  maintenance advice. Please see specific information pulled into the AVS if appropriate.

## 2025-05-20 LAB
ALBUMIN SERPL-MCNC: 4 G/DL (ref 3.5–5.2)
ALBUMIN/GLOB SERPL: 1.7 G/DL
ALP SERPL-CCNC: 100 U/L (ref 39–117)
ALT SERPL-CCNC: 52 U/L (ref 1–33)
AST SERPL-CCNC: 39 U/L (ref 1–32)
BILIRUB SERPL-MCNC: 0.4 MG/DL (ref 0–1.2)
BUN SERPL-MCNC: 29 MG/DL (ref 8–23)
BUN/CREAT SERPL: 32.6 (ref 7–25)
CALCIUM SERPL-MCNC: 9.8 MG/DL (ref 8.6–10.5)
CHLORIDE SERPL-SCNC: 100 MMOL/L (ref 98–107)
CHOLEST SERPL-MCNC: 126 MG/DL (ref 0–200)
CO2 SERPL-SCNC: 21.6 MMOL/L (ref 22–29)
CREAT SERPL-MCNC: 0.89 MG/DL (ref 0.57–1)
DIFFERENTIAL COMMENT: ABNORMAL
EGFRCR SERPLBLD CKD-EPI 2021: 66.5 ML/MIN/1.73
EOSINOPHIL # BLD MANUAL: 0.2 10*3/MM3 (ref 0–0.4)
EOSINOPHIL NFR BLD MANUAL: 2.1 % (ref 0.3–6.2)
ERYTHROCYTE [DISTWIDTH] IN BLOOD BY AUTOMATED COUNT: ABNORMAL %
GLOBULIN SER CALC-MCNC: 2.3 GM/DL
GLUCOSE SERPL-MCNC: 283 MG/DL (ref 65–99)
HBA1C MFR BLD: 8.1 % (ref 4.8–5.6)
HCT VFR BLD AUTO: 45.7 % (ref 34–46.6)
HDLC SERPL-MCNC: 48 MG/DL (ref 40–60)
HGB BLD-MCNC: 14.5 G/DL (ref 12–15.9)
LDLC SERPL CALC-MCNC: 51 MG/DL (ref 0–100)
LDLC/HDLC SERPL: 0.95 {RATIO}
LYMPHOCYTES # BLD MANUAL: 2.39 10*3/MM3 (ref 0.7–3.1)
LYMPHOCYTES NFR BLD MANUAL: 24.5 % (ref 19.6–45.3)
MCH RBC QN AUTO: 26 PG (ref 26.6–33)
MCHC RBC AUTO-ENTMCNC: 31.7 G/DL (ref 31.5–35.7)
MCV RBC AUTO: 82 FL (ref 79–97)
MONOCYTES # BLD MANUAL: 0.11 10*3/MM3 (ref 0.1–0.9)
MONOCYTES NFR BLD MANUAL: 1.1 % (ref 5–12)
NEUTROPHILS # BLD MANUAL: 7.05 10*3/MM3 (ref 1.7–7)
NEUTROPHILS NFR BLD MANUAL: 72.3 % (ref 42.7–76)
PLATELET # BLD AUTO: 234 10*3/MM3 (ref 140–450)
PLATELET BLD QL SMEAR: ABNORMAL
POTASSIUM SERPL-SCNC: 4.9 MMOL/L (ref 3.5–5.2)
PROT SERPL-MCNC: 6.3 G/DL (ref 6–8.5)
RBC # BLD AUTO: 5.57 10*6/MM3 (ref 3.77–5.28)
RBC MORPH BLD: ABNORMAL
SODIUM SERPL-SCNC: 135 MMOL/L (ref 136–145)
T4 FREE SERPL-MCNC: 1.12 NG/DL (ref 0.92–1.68)
TRIGL SERPL-MCNC: 163 MG/DL (ref 0–150)
TSH SERPL DL<=0.005 MIU/L-ACNC: 3.36 UIU/ML (ref 0.27–4.2)
VLDLC SERPL CALC-MCNC: 27 MG/DL (ref 5–40)
WBC # BLD AUTO: 9.75 10*3/MM3 (ref 3.4–10.8)

## 2025-05-21 RX ORDER — ERGOCALCIFEROL 1.25 MG/1
50000 CAPSULE, LIQUID FILLED ORAL WEEKLY
Qty: 13 CAPSULE | Refills: 0 | Status: SHIPPED | OUTPATIENT
Start: 2025-05-21

## 2025-05-21 NOTE — TELEPHONE ENCOUNTER
Rx Refill Note  Requested Prescriptions     Pending Prescriptions Disp Refills    vitamin D (ERGOCALCIFEROL) 1.25 MG (04640 UT) capsule capsule 13 capsule 0     Sig: Take 1 capsule by mouth 1 (One) Time Per Week.      Last office visit with prescribing clinician: 5/19/2025   Last telemedicine visit with prescribing clinician: Visit date not found   Next office visit with prescribing clinician: 8/19/2025                         Would you like a call back once the refill request has been completed: [] Yes [] No    If the office needs to give you a call back, can they leave a voicemail: [] Yes [] No    Sheree Cortes MA  05/21/25, 09:04 EDT

## 2025-06-03 ENCOUNTER — DOCUMENTATION (OUTPATIENT)
Dept: ONCOLOGY | Facility: HOSPITAL | Age: 80
End: 2025-06-03
Payer: MEDICARE

## 2025-06-05 ENCOUNTER — INFUSION (OUTPATIENT)
Dept: ONCOLOGY | Facility: HOSPITAL | Age: 80
End: 2025-06-05
Payer: MEDICARE

## 2025-06-05 DIAGNOSIS — Z91.89 HIGH RISK FOR HIP FRACTURE: Primary | ICD-10-CM

## 2025-06-05 DIAGNOSIS — M85.80 OSTEOPENIA, UNSPECIFIED LOCATION: ICD-10-CM

## 2025-06-05 PROCEDURE — 25010000002 DENOSUMAB 60 MG/ML SOLUTION PREFILLED SYRINGE: Performed by: INTERNAL MEDICINE

## 2025-06-05 PROCEDURE — 96372 THER/PROPH/DIAG INJ SC/IM: CPT

## 2025-06-05 RX ADMIN — DENOSUMAB 60 MG: 60 INJECTION SUBCUTANEOUS at 14:50

## 2025-06-05 NOTE — NURSING NOTE
USED CA 9.8 DATED 5/19/25.     Arrived for prolia injection. Indication and side effects reviewed. Denies recent dental work. Labs and medications verified. Prolia administered in R arm without incidence. Instructed to call prescribing MD for any concerns or questions.  Appt card provided with scheduling number and instructed on how to schedule future appts.  Pt vu and discharged in stable condition.

## 2025-06-30 DIAGNOSIS — I25.10 CHRONIC CORONARY ARTERY DISEASE: ICD-10-CM

## 2025-06-30 DIAGNOSIS — I21.9 MYOCARDIAL INFARCTION IN RECOVERY PHASE: ICD-10-CM

## 2025-06-30 DIAGNOSIS — Z86.73 HISTORY OF CEREBROVASCULAR ACCIDENT (CVA) FROM RIGHT CAROTID ARTERY OCCLUSION INVOLVING RIGHT MIDDLE CEREBRAL ARTERY TERRITORY: ICD-10-CM

## 2025-07-02 RX ORDER — HYDROGEN PEROXIDE 2.65 ML/100ML
81 LIQUID ORAL; TOPICAL DAILY
Qty: 90 TABLET | Refills: 0 | Status: SHIPPED | OUTPATIENT
Start: 2025-07-02

## 2025-07-02 NOTE — TELEPHONE ENCOUNTER
No checkmarks available    Rx Refill Note  Requested Prescriptions     Pending Prescriptions Disp Refills    EQ Aspirin Adult Low Dose 81 MG EC tablet [Pharmacy Med Name: EQ Aspirin Adult Low Dose 81 MG Oral Tablet Delayed Release] 90 tablet 0     Sig: Take 1 tablet by mouth once daily      Last office visit with prescribing clinician: Visit date not found   Last telemedicine visit with prescribing clinician: Visit date not found   Next office visit with prescribing clinician: Visit date not found                         Would you like a call back once the refill request has been completed: [] Yes [] No    If the office needs to give you a call back, can they leave a voicemail: [] Yes [] No    Arian Medina MA  07/02/25, 08:24 EDT

## 2025-07-15 ENCOUNTER — PATIENT MESSAGE (OUTPATIENT)
Dept: INTERNAL MEDICINE | Facility: CLINIC | Age: 80
End: 2025-07-15
Payer: MEDICARE

## 2025-07-15 DIAGNOSIS — R10.30 LOWER ABDOMINAL PAIN: Primary | ICD-10-CM

## 2025-07-15 DIAGNOSIS — R79.89 ELEVATED LIVER FUNCTION TESTS: ICD-10-CM

## 2025-07-15 DIAGNOSIS — E61.1 IRON DEFICIENCY: ICD-10-CM

## 2025-07-30 ENCOUNTER — HOSPITAL ENCOUNTER (OUTPATIENT)
Dept: CT IMAGING | Facility: HOSPITAL | Age: 80
Discharge: HOME | End: 2025-07-30
Admitting: INTERNAL MEDICINE
Payer: MEDICARE

## 2025-07-30 DIAGNOSIS — E61.1 IRON DEFICIENCY: ICD-10-CM

## 2025-07-30 DIAGNOSIS — R10.30 LOWER ABDOMINAL PAIN: ICD-10-CM

## 2025-07-30 DIAGNOSIS — R79.89 ELEVATED LIVER FUNCTION TESTS: ICD-10-CM

## 2025-07-30 PROCEDURE — 74177 CT ABD & PELVIS W/CONTRAST: CPT

## 2025-07-30 PROCEDURE — 25510000002 DIATRIZOATE MEGLUMINE & SODIUM PER 1 ML: Performed by: INTERNAL MEDICINE

## 2025-07-30 PROCEDURE — 25510000001 IOPAMIDOL 61 % SOLUTION: Performed by: INTERNAL MEDICINE

## 2025-07-30 RX ORDER — DIATRIZOATE MEGLUMINE AND DIATRIZOATE SODIUM 660; 100 MG/ML; MG/ML
30 SOLUTION ORAL; RECTAL
Status: COMPLETED | OUTPATIENT
Start: 2025-07-30 | End: 2025-07-30

## 2025-07-30 RX ORDER — IOPAMIDOL 612 MG/ML
100 INJECTION, SOLUTION INTRAVASCULAR
Status: COMPLETED | OUTPATIENT
Start: 2025-07-30 | End: 2025-07-30

## 2025-07-30 RX ADMIN — IOPAMIDOL 85 ML: 612 INJECTION, SOLUTION INTRAVENOUS at 15:09

## 2025-07-30 RX ADMIN — DIATRIZOATE MEGLUMINE AND DIATRIZOATE SODIUM 30 ML: 660; 100 LIQUID ORAL; RECTAL at 13:50

## 2025-08-02 DIAGNOSIS — E11.44 DIABETIC AMYOTROPHY ASSOCIATED WITH TYPE 2 DIABETES MELLITUS: ICD-10-CM

## 2025-08-02 DIAGNOSIS — F43.21 SITUATIONAL DEPRESSION: ICD-10-CM

## 2025-08-04 RX ORDER — DULOXETIN HYDROCHLORIDE 60 MG/1
60 CAPSULE, DELAYED RELEASE ORAL DAILY
Qty: 90 CAPSULE | Refills: 0 | Status: SHIPPED | OUTPATIENT
Start: 2025-08-04

## 2025-08-07 ENCOUNTER — PATIENT MESSAGE (OUTPATIENT)
Dept: INTERNAL MEDICINE | Facility: CLINIC | Age: 80
End: 2025-08-07
Payer: MEDICARE

## 2025-08-07 DIAGNOSIS — E11.44 TYPE 2 DIABETES MELLITUS WITH DIABETIC AMYOTROPHY, WITHOUT LONG-TERM CURRENT USE OF INSULIN: Primary | ICD-10-CM

## 2025-08-13 RX ORDER — INSULIN GLARGINE 300 U/ML
35 INJECTION, SOLUTION SUBCUTANEOUS NIGHTLY
Qty: 11 ML | Refills: 3 | Status: SHIPPED | OUTPATIENT
Start: 2025-08-13

## 2025-08-18 DIAGNOSIS — E55.9 VITAMIN D DEFICIENCY: ICD-10-CM

## 2025-08-18 RX ORDER — ERGOCALCIFEROL 1.25 MG/1
50000 CAPSULE, LIQUID FILLED ORAL WEEKLY
Qty: 13 CAPSULE | Refills: 0 | OUTPATIENT
Start: 2025-08-18

## 2025-08-19 ENCOUNTER — TELEPHONE (OUTPATIENT)
Dept: INTERNAL MEDICINE | Facility: CLINIC | Age: 80
End: 2025-08-19

## 2025-08-19 ENCOUNTER — OFFICE VISIT (OUTPATIENT)
Dept: INTERNAL MEDICINE | Facility: CLINIC | Age: 80
End: 2025-08-19
Payer: MEDICARE

## 2025-08-19 VITALS
SYSTOLIC BLOOD PRESSURE: 112 MMHG | TEMPERATURE: 97.5 F | WEIGHT: 158.8 LBS | HEART RATE: 58 BPM | DIASTOLIC BLOOD PRESSURE: 70 MMHG | BODY MASS INDEX: 31.01 KG/M2 | OXYGEN SATURATION: 94 %

## 2025-08-19 DIAGNOSIS — I10 ESSENTIAL HYPERTENSION: ICD-10-CM

## 2025-08-19 DIAGNOSIS — Z78.0 POST-MENOPAUSAL: ICD-10-CM

## 2025-08-19 DIAGNOSIS — N88.8 CERVICAL MASS: ICD-10-CM

## 2025-08-19 DIAGNOSIS — E78.2 MIXED HYPERLIPIDEMIA: ICD-10-CM

## 2025-08-19 DIAGNOSIS — Z12.11 COLON CANCER SCREENING: ICD-10-CM

## 2025-08-19 DIAGNOSIS — R10.30 LOWER ABDOMINAL PAIN: ICD-10-CM

## 2025-08-19 DIAGNOSIS — E11.44 TYPE 2 DIABETES MELLITUS WITH DIABETIC AMYOTROPHY, WITHOUT LONG-TERM CURRENT USE OF INSULIN: Primary | ICD-10-CM

## 2025-08-19 DIAGNOSIS — E61.1 IRON DEFICIENCY: ICD-10-CM

## 2025-08-19 DIAGNOSIS — Z12.31 BREAST CANCER SCREENING BY MAMMOGRAM: ICD-10-CM

## 2025-08-19 LAB
EXPIRATION DATE: ABNORMAL
Lab: ABNORMAL
POC ALBUMIN, URINE: ABNORMAL MG/L
POC CREATININE, URINE: ABNORMAL MG/DL
POC URINE ALB/CREA RATIO: ABNORMAL

## 2025-08-20 LAB
ALBUMIN SERPL-MCNC: 4.3 G/DL (ref 3.8–4.8)
ALP SERPL-CCNC: 104 IU/L (ref 44–121)
ALT SERPL-CCNC: 66 IU/L (ref 0–32)
AST SERPL-CCNC: 40 IU/L (ref 0–40)
BASOPHILS # BLD AUTO: 0 X10E3/UL (ref 0–0.2)
BASOPHILS NFR BLD AUTO: 0 %
BILIRUB SERPL-MCNC: 0.9 MG/DL (ref 0–1.2)
BUN SERPL-MCNC: 24 MG/DL (ref 8–27)
BUN/CREAT SERPL: 23 (ref 12–28)
CALCIUM SERPL-MCNC: 10.3 MG/DL (ref 8.7–10.3)
CHLORIDE SERPL-SCNC: 98 MMOL/L (ref 96–106)
CHOLEST SERPL-MCNC: 162 MG/DL (ref 100–199)
CO2 SERPL-SCNC: 21 MMOL/L (ref 20–29)
CREAT SERPL-MCNC: 1.04 MG/DL (ref 0.57–1)
EGFRCR SERPLBLD CKD-EPI 2021: 55 ML/MIN/1.73
EOSINOPHIL # BLD AUTO: 0.3 X10E3/UL (ref 0–0.4)
EOSINOPHIL NFR BLD AUTO: 3 %
ERYTHROCYTE [DISTWIDTH] IN BLOOD BY AUTOMATED COUNT: 13.7 % (ref 11.7–15.4)
GLOBULIN SER CALC-MCNC: 2.2 G/DL (ref 1.5–4.5)
GLUCOSE SERPL-MCNC: 158 MG/DL (ref 70–99)
HBA1C MFR BLD: 10.2 % (ref 4.8–5.6)
HCT VFR BLD AUTO: 50.2 % (ref 34–46.6)
HDLC SERPL-MCNC: 59 MG/DL
HGB BLD-MCNC: 16.3 G/DL (ref 11.1–15.9)
IMM GRANULOCYTES # BLD AUTO: 0 X10E3/UL (ref 0–0.1)
IMM GRANULOCYTES NFR BLD AUTO: 0 %
LDLC SERPL CALC-MCNC: 81 MG/DL (ref 0–99)
LDLC/HDLC SERPL: 1.4 RATIO (ref 0–3.2)
LYMPHOCYTES # BLD AUTO: 2.4 X10E3/UL (ref 0.7–3.1)
LYMPHOCYTES NFR BLD AUTO: 26 %
MCH RBC QN AUTO: 30.8 PG (ref 26.6–33)
MCHC RBC AUTO-ENTMCNC: 32.5 G/DL (ref 31.5–35.7)
MCV RBC AUTO: 95 FL (ref 79–97)
MONOCYTES # BLD AUTO: 0.6 X10E3/UL (ref 0.1–0.9)
MONOCYTES NFR BLD AUTO: 7 %
NEUTROPHILS # BLD AUTO: 5.8 X10E3/UL (ref 1.4–7)
NEUTROPHILS NFR BLD AUTO: 64 %
PLATELET # BLD AUTO: 180 X10E3/UL (ref 150–450)
POTASSIUM SERPL-SCNC: 5.4 MMOL/L (ref 3.5–5.2)
PROT SERPL-MCNC: 6.5 G/DL (ref 6–8.5)
RBC # BLD AUTO: 5.3 X10E6/UL (ref 3.77–5.28)
SODIUM SERPL-SCNC: 136 MMOL/L (ref 134–144)
T4 FREE SERPL-MCNC: 1.11 NG/DL (ref 0.82–1.77)
TRIGL SERPL-MCNC: 124 MG/DL (ref 0–149)
TSH SERPL DL<=0.005 MIU/L-ACNC: 3.71 UIU/ML (ref 0.45–4.5)
VLDLC SERPL CALC-MCNC: 22 MG/DL (ref 5–40)
WBC # BLD AUTO: 9.2 X10E3/UL (ref 3.4–10.8)